# Patient Record
Sex: MALE | Race: WHITE | NOT HISPANIC OR LATINO
[De-identification: names, ages, dates, MRNs, and addresses within clinical notes are randomized per-mention and may not be internally consistent; named-entity substitution may affect disease eponyms.]

---

## 2018-02-12 PROBLEM — Z00.00 ENCOUNTER FOR PREVENTIVE HEALTH EXAMINATION: Status: ACTIVE | Noted: 2018-02-12

## 2018-02-22 ENCOUNTER — APPOINTMENT (OUTPATIENT)
Dept: NEUROLOGY | Facility: CLINIC | Age: 68
End: 2018-02-22
Payer: MEDICARE

## 2018-02-22 VITALS
WEIGHT: 160 LBS | HEIGHT: 69 IN | BODY MASS INDEX: 23.7 KG/M2 | SYSTOLIC BLOOD PRESSURE: 130 MMHG | DIASTOLIC BLOOD PRESSURE: 70 MMHG

## 2018-02-22 DIAGNOSIS — Z87.438 PERSONAL HISTORY OF OTHER DISEASES OF MALE GENITAL ORGANS: ICD-10-CM

## 2018-02-22 PROCEDURE — 99204 OFFICE O/P NEW MOD 45 MIN: CPT

## 2018-04-13 ENCOUNTER — APPOINTMENT (OUTPATIENT)
Dept: NEUROLOGY | Facility: CLINIC | Age: 68
End: 2018-04-13

## 2018-05-26 ENCOUNTER — EMERGENCY (EMERGENCY)
Facility: HOSPITAL | Age: 68
LOS: 1 days | Discharge: HOME | End: 2018-05-26
Attending: EMERGENCY MEDICINE
Payer: MEDICARE

## 2018-05-26 VITALS
OXYGEN SATURATION: 98 % | SYSTOLIC BLOOD PRESSURE: 149 MMHG | DIASTOLIC BLOOD PRESSURE: 91 MMHG | HEART RATE: 73 BPM | RESPIRATION RATE: 18 BRPM | TEMPERATURE: 98 F

## 2018-05-26 VITALS
DIASTOLIC BLOOD PRESSURE: 90 MMHG | HEART RATE: 80 BPM | OXYGEN SATURATION: 98 % | TEMPERATURE: 98 F | RESPIRATION RATE: 20 BRPM | SYSTOLIC BLOOD PRESSURE: 147 MMHG

## 2018-05-26 PROCEDURE — 23650 CLTX SHO DSLC W/MNPJ WO ANES: CPT | Mod: 54

## 2018-05-26 PROCEDURE — 73030 X-RAY EXAM OF SHOULDER: CPT

## 2018-05-26 PROCEDURE — 99283 EMERGENCY DEPT VISIT LOW MDM: CPT | Mod: 57,25

## 2018-05-26 PROCEDURE — 73030 X-RAY EXAM OF SHOULDER: CPT | Mod: 26,77,RT

## 2018-05-26 PROCEDURE — 73030 X-RAY EXAM OF SHOULDER: CPT | Mod: 26,RT

## 2018-05-26 PROCEDURE — 99283 EMERGENCY DEPT VISIT LOW MDM: CPT | Mod: 25

## 2018-05-26 PROCEDURE — 23650 CLTX SHO DSLC W/MNPJ WO ANES: CPT | Mod: RT

## 2018-05-26 RX ORDER — IBUPROFEN 200 MG
1 TABLET ORAL
Qty: 15 | Refills: 0
Start: 2018-05-26 | End: 2018-05-30

## 2018-05-26 RX ORDER — LIDOCAINE HCL 20 MG/ML
5 VIAL (ML) INJECTION ONCE
Qty: 0 | Refills: 0 | Status: COMPLETED | OUTPATIENT
Start: 2018-05-26 | End: 2018-05-26

## 2018-05-26 RX ORDER — OXYCODONE AND ACETAMINOPHEN 5; 325 MG/1; MG/1
1 TABLET ORAL ONCE
Qty: 0 | Refills: 0 | Status: DISCONTINUED | OUTPATIENT
Start: 2018-05-26 | End: 2018-05-26

## 2018-05-26 RX ADMIN — OXYCODONE AND ACETAMINOPHEN 1 TABLET(S): 5; 325 TABLET ORAL at 18:30

## 2018-05-26 RX ADMIN — Medication 5 MILLILITER(S): at 15:30

## 2018-05-26 RX ADMIN — OXYCODONE AND ACETAMINOPHEN 1 TABLET(S): 5; 325 TABLET ORAL at 17:07

## 2018-05-26 NOTE — ED PROVIDER NOTE - MEDICAL DECISION MAKING DETAILS
Right shoulder deformity s/p fall r/o fracture/dislocation.  Will give medication for pain and check Xray.

## 2018-05-26 NOTE — ED PROVIDER NOTE - OBJECTIVE STATEMENT
This patient is a 67 year old man who presents to the ER c/o right shoulder pain s/p fall.  Patient states that he tripped over an object in his yard and fell onto his shoulder.  No LOC.

## 2018-10-07 ENCOUNTER — EMERGENCY (EMERGENCY)
Facility: HOSPITAL | Age: 68
LOS: 1 days | Discharge: HOME | End: 2018-10-07
Payer: MEDICARE

## 2018-10-07 PROCEDURE — 99284 EMERGENCY DEPT VISIT MOD MDM: CPT | Mod: 25

## 2018-10-07 PROCEDURE — 99284 EMERGENCY DEPT VISIT MOD MDM: CPT

## 2018-10-07 PROCEDURE — 70450 CT HEAD/BRAIN W/O DYE: CPT | Mod: 26

## 2018-10-07 PROCEDURE — 70450 CT HEAD/BRAIN W/O DYE: CPT

## 2018-10-08 VITALS
TEMPERATURE: 98 F | HEART RATE: 96 BPM | SYSTOLIC BLOOD PRESSURE: 174 MMHG | RESPIRATION RATE: 20 BRPM | DIASTOLIC BLOOD PRESSURE: 92 MMHG | OXYGEN SATURATION: 97 %

## 2019-06-28 ENCOUNTER — APPOINTMENT (OUTPATIENT)
Dept: ORTHOPEDIC SURGERY | Facility: CLINIC | Age: 69
End: 2019-06-28
Payer: MEDICARE

## 2019-06-28 VITALS
SYSTOLIC BLOOD PRESSURE: 160 MMHG | HEART RATE: 68 BPM | BODY MASS INDEX: 23.7 KG/M2 | HEIGHT: 69 IN | DIASTOLIC BLOOD PRESSURE: 87 MMHG | WEIGHT: 160 LBS

## 2019-06-28 DIAGNOSIS — Z86.59 PERSONAL HISTORY OF OTHER MENTAL AND BEHAVIORAL DISORDERS: ICD-10-CM

## 2019-06-28 DIAGNOSIS — Z87.09 PERSONAL HISTORY OF OTHER DISEASES OF THE RESPIRATORY SYSTEM: ICD-10-CM

## 2019-06-28 DIAGNOSIS — Z86.79 PERSONAL HISTORY OF OTHER DISEASES OF THE CIRCULATORY SYSTEM: ICD-10-CM

## 2019-06-28 DIAGNOSIS — M47.816 SPONDYLOSIS W/OUT MYELOPATHY OR RADICULOPATHY, LUMBAR REGION: ICD-10-CM

## 2019-06-28 PROCEDURE — 99204 OFFICE O/P NEW MOD 45 MIN: CPT

## 2019-06-28 NOTE — ADDENDUM
[FreeTextEntry1] : Documented by Sergio Grant acting as a scribe for Dr. Santiago Camargo on 06/28/2019 . All medical record entries made by the Scribe were at my, Dr. Santiago Camargo, direction and personally dictated by me on 06/28/2019 . I have reviewed the chart and agree that the record accurately reflects my personal performance of the history, physical exam, assessment and plan. I have also personally directed, reviewed, and agreed with the chart.

## 2019-06-28 NOTE — HISTORY OF PRESENT ILLNESS
[Bending] : worsened by bending [Rest] : relieved by rest [de-identified] : 68 year old M presents with lumbar spine pain. Occasional LBP when he bends over for 5-10 minutes, for example when he is fishing. Relieved by relaxing. He walks 2 miles per day with no complaints. No c/o numbness, tingling, or weakness. [Standing] : not worsened by standing [Sitting] : not worsened by sitting [Incontinence] : no incontinence [Walking] : not worsened by walking [Ataxia] : no ataxia [Loss of Dexterity] : good dexterity [Urinary Ret.] : no urinary retention

## 2019-06-28 NOTE — PHYSICAL EXAM
[Acute Distress] : not in acute distress [Poor Appearance] : well-appearing [Obese] : not obese [de-identified] : CONSTITUTIONAL: The patient is a very pleasant individual who is well-nourished and who appears stated age.\par PSYCHIATRIC: The patient is alert and oriented X 3 and in no apparent distress, and participates with orthopedic evaluation well.\par HEAD: Atraumatic and is nonsyndromic in appearance.\par EENT: No visible thyromegaly, EOMI.\par RESPIRATORY: Respiratory rate is regular, not dyspneic on examination.\par LYMPHATICS: There is no inguinal lymphadenopathy\par INTEGUMENTARY: Skin is clean, dry, and intact about the bilateral lower extremities and lumbar spine.\par VASCULAR: There is brisk capillary refill about the bilateral lower extremities.\par NEUROLOGIC: There are no pathologic reflexes. There is no decrease in sensation of the bilateral lower extremities on Wartenberg pinwheel examination. Deep tendon reflexes are well maintained at 2+/4 of the bilateral lower extremities and are symmetric.\par MUSCULOSKELETAL: There is no visible muscular atrophy. Manual motor strength is well maintained in the bilateral lower extremities. Range of motion of lumbar spine is well maintained. The patient ambulates in a non-myelopathic manner. Negative tension sign and straight leg raise bilaterally. Quad extension, ankle dorsiflexion, EHL, plantar flexion, and ankle eversion are well preserved. Normal secondary orthopaedic exam of bilateral hips, greater trochanteric area, knees and ankles \par \par Mechanically orientated low back pain, specifically with prolonged forward flexion. [de-identified] : X-ray of the lumbar spine taken in office today shows lumbar spondylosis, particularly L5-S1.

## 2019-06-28 NOTE — DISCUSSION/SUMMARY
[de-identified] : I have recommended that the pt continue with a conservative treatment plan. Pt has been referred to physical therapy for decreased pain modalities, core strengthening modalities, soft tissue modalities, and physical modalities. I recommend anti-inflammatories right before he goes fishing / PRN. We also spoke about the benefits of using heat, ice, and Bengay cream. Pt can also use a soft OTC lumbar brace. We also discussed activity modification. If he follows this regimen and has no relief in 6 weeks, pt can return to the office and I will order a lumbar MRI.

## 2019-07-20 ENCOUNTER — TRANSCRIPTION ENCOUNTER (OUTPATIENT)
Age: 69
End: 2019-07-20

## 2019-07-21 ENCOUNTER — RESULT REVIEW (OUTPATIENT)
Age: 69
End: 2019-07-21

## 2019-07-21 ENCOUNTER — INPATIENT (INPATIENT)
Facility: HOSPITAL | Age: 69
LOS: 1 days | Discharge: HOME | DRG: 340 | End: 2019-07-23
Attending: SURGERY | Admitting: SURGERY
Payer: MEDICARE

## 2019-07-21 VITALS
SYSTOLIC BLOOD PRESSURE: 123 MMHG | OXYGEN SATURATION: 96 % | RESPIRATION RATE: 20 BRPM | HEIGHT: 70 IN | DIASTOLIC BLOOD PRESSURE: 74 MMHG | TEMPERATURE: 98 F | WEIGHT: 220.02 LBS | HEART RATE: 89 BPM

## 2019-07-21 DIAGNOSIS — K37 UNSPECIFIED APPENDICITIS: ICD-10-CM

## 2019-07-21 LAB
ALBUMIN SERPL ELPH-MCNC: 4 G/DL — SIGNIFICANT CHANGE UP (ref 3.3–5.2)
ALP SERPL-CCNC: 51 U/L — SIGNIFICANT CHANGE UP (ref 40–120)
ALT FLD-CCNC: 17 U/L — SIGNIFICANT CHANGE UP
ANION GAP SERPL CALC-SCNC: 10 MMOL/L — SIGNIFICANT CHANGE UP (ref 5–17)
AST SERPL-CCNC: 22 U/L — SIGNIFICANT CHANGE UP
BASOPHILS # BLD AUTO: 0.07 K/UL — SIGNIFICANT CHANGE UP (ref 0–0.2)
BASOPHILS NFR BLD AUTO: 0.9 % — SIGNIFICANT CHANGE UP (ref 0–2)
BILIRUB SERPL-MCNC: 0.7 MG/DL — SIGNIFICANT CHANGE UP (ref 0.4–2)
BLD GP AB SCN SERPL QL: SIGNIFICANT CHANGE UP
BUN SERPL-MCNC: 15 MG/DL — SIGNIFICANT CHANGE UP (ref 8–20)
CALCIUM SERPL-MCNC: 9.5 MG/DL — SIGNIFICANT CHANGE UP (ref 8.6–10.2)
CHLORIDE SERPL-SCNC: 98 MMOL/L — SIGNIFICANT CHANGE UP (ref 98–107)
CO2 SERPL-SCNC: 24 MMOL/L — SIGNIFICANT CHANGE UP (ref 22–29)
CREAT SERPL-MCNC: 0.94 MG/DL — SIGNIFICANT CHANGE UP (ref 0.5–1.3)
EOSINOPHIL # BLD AUTO: 0.14 K/UL — SIGNIFICANT CHANGE UP (ref 0–0.5)
EOSINOPHIL NFR BLD AUTO: 1.7 % — SIGNIFICANT CHANGE UP (ref 0–6)
GLUCOSE SERPL-MCNC: 120 MG/DL — HIGH (ref 70–115)
HCT VFR BLD CALC: 41.6 % — SIGNIFICANT CHANGE UP (ref 39–50)
HGB BLD-MCNC: 14 G/DL — SIGNIFICANT CHANGE UP (ref 13–17)
LIDOCAIN IGE QN: 26 U/L — SIGNIFICANT CHANGE UP (ref 22–51)
LYMPHOCYTES # BLD AUTO: 1.86 K/UL — SIGNIFICANT CHANGE UP (ref 1–3.3)
LYMPHOCYTES # BLD AUTO: 22.6 % — SIGNIFICANT CHANGE UP (ref 13–44)
MCHC RBC-ENTMCNC: 31.2 PG — SIGNIFICANT CHANGE UP (ref 27–34)
MCHC RBC-ENTMCNC: 33.7 GM/DL — SIGNIFICANT CHANGE UP (ref 32–36)
MCV RBC AUTO: 92.7 FL — SIGNIFICANT CHANGE UP (ref 80–100)
MONOCYTES # BLD AUTO: 1.01 K/UL — HIGH (ref 0–0.9)
MONOCYTES NFR BLD AUTO: 12.2 % — SIGNIFICANT CHANGE UP (ref 2–14)
NEUTROPHILS # BLD AUTO: 4.95 K/UL — SIGNIFICANT CHANGE UP (ref 1.8–7.4)
NEUTROPHILS NFR BLD AUTO: 60 % — SIGNIFICANT CHANGE UP (ref 43–77)
PLAT MORPH BLD: NORMAL — SIGNIFICANT CHANGE UP
PLATELET # BLD AUTO: 175 K/UL — SIGNIFICANT CHANGE UP (ref 150–400)
POTASSIUM SERPL-MCNC: 3.9 MMOL/L — SIGNIFICANT CHANGE UP (ref 3.5–5.3)
POTASSIUM SERPL-SCNC: 3.9 MMOL/L — SIGNIFICANT CHANGE UP (ref 3.5–5.3)
PROT SERPL-MCNC: 7 G/DL — SIGNIFICANT CHANGE UP (ref 6.6–8.7)
RBC # BLD: 4.49 M/UL — SIGNIFICANT CHANGE UP (ref 4.2–5.8)
RBC # FLD: 12.6 % — SIGNIFICANT CHANGE UP (ref 10.3–14.5)
RBC BLD AUTO: NORMAL — SIGNIFICANT CHANGE UP
SODIUM SERPL-SCNC: 132 MMOL/L — LOW (ref 135–145)
VARIANT LYMPHS # BLD: 2.6 % — SIGNIFICANT CHANGE UP (ref 0–6)
WBC # BLD: 8.25 K/UL — SIGNIFICANT CHANGE UP (ref 3.8–10.5)
WBC # FLD AUTO: 8.25 K/UL — SIGNIFICANT CHANGE UP (ref 3.8–10.5)

## 2019-07-21 PROCEDURE — 88304 TISSUE EXAM BY PATHOLOGIST: CPT | Mod: 26

## 2019-07-21 PROCEDURE — 93010 ELECTROCARDIOGRAM REPORT: CPT

## 2019-07-21 PROCEDURE — 74177 CT ABD & PELVIS W/CONTRAST: CPT | Mod: 26

## 2019-07-21 PROCEDURE — 99284 EMERGENCY DEPT VISIT MOD MDM: CPT

## 2019-07-21 RX ORDER — LISINOPRIL 2.5 MG/1
40 TABLET ORAL DAILY
Refills: 0 | Status: DISCONTINUED | OUTPATIENT
Start: 2019-07-21 | End: 2019-07-23

## 2019-07-21 RX ORDER — ONDANSETRON 8 MG/1
4 TABLET, FILM COATED ORAL ONCE
Refills: 0 | Status: COMPLETED | OUTPATIENT
Start: 2019-07-21 | End: 2019-07-21

## 2019-07-21 RX ORDER — ATORVASTATIN CALCIUM 80 MG/1
10 TABLET, FILM COATED ORAL AT BEDTIME
Refills: 0 | Status: DISCONTINUED | OUTPATIENT
Start: 2019-07-21 | End: 2019-07-23

## 2019-07-21 RX ORDER — ESCITALOPRAM OXALATE 10 MG/1
20 TABLET, FILM COATED ORAL DAILY
Refills: 0 | Status: DISCONTINUED | OUTPATIENT
Start: 2019-07-21 | End: 2019-07-23

## 2019-07-21 RX ORDER — SODIUM CHLORIDE 9 MG/ML
1000 INJECTION, SOLUTION INTRAVENOUS
Refills: 0 | Status: DISCONTINUED | OUTPATIENT
Start: 2019-07-21 | End: 2019-07-21

## 2019-07-21 RX ORDER — CEFOTETAN DISODIUM 1 G
VIAL (EA) INJECTION
Refills: 0 | Status: DISCONTINUED | OUTPATIENT
Start: 2019-07-21 | End: 2019-07-21

## 2019-07-21 RX ORDER — PIPERACILLIN AND TAZOBACTAM 4; .5 G/20ML; G/20ML
3.38 INJECTION, POWDER, LYOPHILIZED, FOR SOLUTION INTRAVENOUS ONCE
Refills: 0 | Status: DISCONTINUED | OUTPATIENT
Start: 2019-07-21 | End: 2019-07-21

## 2019-07-21 RX ORDER — PIPERACILLIN AND TAZOBACTAM 4; .5 G/20ML; G/20ML
3.38 INJECTION, POWDER, LYOPHILIZED, FOR SOLUTION INTRAVENOUS EVERY 8 HOURS
Refills: 0 | Status: DISCONTINUED | OUTPATIENT
Start: 2019-07-21 | End: 2019-07-23

## 2019-07-21 RX ORDER — SODIUM CHLORIDE 9 MG/ML
1000 INJECTION INTRAMUSCULAR; INTRAVENOUS; SUBCUTANEOUS ONCE
Refills: 0 | Status: COMPLETED | OUTPATIENT
Start: 2019-07-21 | End: 2019-07-21

## 2019-07-21 RX ORDER — OXYCODONE AND ACETAMINOPHEN 5; 325 MG/1; MG/1
1 TABLET ORAL EVERY 4 HOURS
Refills: 0 | Status: DISCONTINUED | OUTPATIENT
Start: 2019-07-21 | End: 2019-07-23

## 2019-07-21 RX ORDER — BUDESONIDE AND FORMOTEROL FUMARATE DIHYDRATE 160; 4.5 UG/1; UG/1
2 AEROSOL RESPIRATORY (INHALATION)
Refills: 0 | Status: DISCONTINUED | OUTPATIENT
Start: 2019-07-21 | End: 2019-07-23

## 2019-07-21 RX ORDER — IBUPROFEN 200 MG
400 TABLET ORAL EVERY 8 HOURS
Refills: 0 | Status: DISCONTINUED | OUTPATIENT
Start: 2019-07-21 | End: 2019-07-23

## 2019-07-21 RX ORDER — HYDROMORPHONE HYDROCHLORIDE 2 MG/ML
1 INJECTION INTRAMUSCULAR; INTRAVENOUS; SUBCUTANEOUS
Refills: 0 | Status: DISCONTINUED | OUTPATIENT
Start: 2019-07-21 | End: 2019-07-21

## 2019-07-21 RX ORDER — DOCUSATE SODIUM 100 MG
100 CAPSULE ORAL THREE TIMES A DAY
Refills: 0 | Status: DISCONTINUED | OUTPATIENT
Start: 2019-07-21 | End: 2019-07-23

## 2019-07-21 RX ORDER — SODIUM CHLORIDE 9 MG/ML
1000 INJECTION INTRAMUSCULAR; INTRAVENOUS; SUBCUTANEOUS
Refills: 0 | Status: DISCONTINUED | OUTPATIENT
Start: 2019-07-21 | End: 2019-07-22

## 2019-07-21 RX ORDER — OXYCODONE AND ACETAMINOPHEN 5; 325 MG/1; MG/1
2 TABLET ORAL EVERY 6 HOURS
Refills: 0 | Status: DISCONTINUED | OUTPATIENT
Start: 2019-07-21 | End: 2019-07-23

## 2019-07-21 RX ORDER — FENTANYL CITRATE 50 UG/ML
50 INJECTION INTRAVENOUS
Refills: 0 | Status: DISCONTINUED | OUTPATIENT
Start: 2019-07-21 | End: 2019-07-21

## 2019-07-21 RX ORDER — DOXAZOSIN MESYLATE 4 MG
4 TABLET ORAL AT BEDTIME
Refills: 0 | Status: DISCONTINUED | OUTPATIENT
Start: 2019-07-21 | End: 2019-07-23

## 2019-07-21 RX ORDER — FAMOTIDINE 10 MG/ML
20 INJECTION INTRAVENOUS EVERY 12 HOURS
Refills: 0 | Status: DISCONTINUED | OUTPATIENT
Start: 2019-07-21 | End: 2019-07-23

## 2019-07-21 RX ORDER — CEFOTETAN DISODIUM 1 G
2 VIAL (EA) INJECTION ONCE
Refills: 0 | Status: DISCONTINUED | OUTPATIENT
Start: 2019-07-21 | End: 2019-07-21

## 2019-07-21 RX ORDER — MORPHINE SULFATE 50 MG/1
4 CAPSULE, EXTENDED RELEASE ORAL ONCE
Refills: 0 | Status: DISCONTINUED | OUTPATIENT
Start: 2019-07-21 | End: 2019-07-21

## 2019-07-21 RX ADMIN — MORPHINE SULFATE 4 MILLIGRAM(S): 50 CAPSULE, EXTENDED RELEASE ORAL at 13:05

## 2019-07-21 RX ADMIN — FENTANYL CITRATE 50 MICROGRAM(S): 50 INJECTION INTRAVENOUS at 20:38

## 2019-07-21 RX ADMIN — OXYCODONE AND ACETAMINOPHEN 1 TABLET(S): 5; 325 TABLET ORAL at 23:00

## 2019-07-21 RX ADMIN — SODIUM CHLORIDE 1000 MILLILITER(S): 9 INJECTION INTRAMUSCULAR; INTRAVENOUS; SUBCUTANEOUS at 13:05

## 2019-07-21 RX ADMIN — ONDANSETRON 4 MILLIGRAM(S): 8 TABLET, FILM COATED ORAL at 13:05

## 2019-07-21 RX ADMIN — PIPERACILLIN AND TAZOBACTAM 25 GRAM(S): 4; .5 INJECTION, POWDER, LYOPHILIZED, FOR SOLUTION INTRAVENOUS at 21:49

## 2019-07-21 RX ADMIN — SODIUM CHLORIDE 120 MILLILITER(S): 9 INJECTION INTRAMUSCULAR; INTRAVENOUS; SUBCUTANEOUS at 21:50

## 2019-07-21 RX ADMIN — ONDANSETRON 4 MILLIGRAM(S): 8 TABLET, FILM COATED ORAL at 19:54

## 2019-07-21 RX ADMIN — Medication 4 MILLIGRAM(S): at 21:50

## 2019-07-21 RX ADMIN — ATORVASTATIN CALCIUM 10 MILLIGRAM(S): 80 TABLET, FILM COATED ORAL at 21:50

## 2019-07-21 RX ADMIN — OXYCODONE AND ACETAMINOPHEN 1 TABLET(S): 5; 325 TABLET ORAL at 22:01

## 2019-07-21 RX ADMIN — Medication 100 MILLIGRAM(S): at 21:50

## 2019-07-21 RX ADMIN — FENTANYL CITRATE 50 MICROGRAM(S): 50 INJECTION INTRAVENOUS at 19:54

## 2019-07-21 NOTE — ED STATDOCS - ATTENDING CONTRIBUTION TO CARE
I, Severino Hargrove, performed the initial face to face bedside interview with this patient regarding history of present illness, review of symptoms and relevant past medical, social and family history.  I completed an independent physical examination.  I was the initial provider who evaluated this patient. I have signed out the follow up of any pending tests (i.e. labs, radiological studies) to the ACP.  I have communicated the patient’s plan of care and disposition with the ACP.  The history, relevant review of systems, past medical and surgical history, medical decision making, and physical examination was documented by the scribe in my presence and I attest to the accuracy of the documentation.

## 2019-07-21 NOTE — ED ADULT NURSE NOTE - OBJECTIVE STATEMENT
patient complaining of lower abdominal pain intermittent for 3 days, ttp to right lower quadrant and some tenderness to left lower quadrant with associated nausea with vomiting, denied fever,

## 2019-07-21 NOTE — ED ADULT TRIAGE NOTE - CHIEF COMPLAINT QUOTE
Pt sent from urgent care for r/o bowel obstruction, difficulty urinating/having BM x 3 days and generalized abdominal pain, c/o nausea without vomiting

## 2019-07-21 NOTE — BRIEF OPERATIVE NOTE - NSICDXBRIEFPOSTOP_GEN_ALL_CORE_FT
POST-OP DIAGNOSIS:  Acute appendicitis with perforation, localized peritonitis, and gangrene, without abscess 21-Jul-2019 19:37:05  Romie Mcclendon

## 2019-07-21 NOTE — ED STATDOCS - GASTROINTESTINAL, MLM
abdomen soft, RUQ and RLQ tender to palpation, more significant in RLQ, with guarding and rebounding, and non-distended.

## 2019-07-21 NOTE — H&P ADULT - NSHPPHYSICALEXAM_GEN_ALL_CORE
Constitutional: Patient resting comfortably in bed in no acute distress   HEENT: EOMI/PERRL b/l  Neck: No JVD  Respiratory: CTAB with unlabored respirations, no accessory muscle use or conversational dyspnea   Cardiovascular: Regular rate and rhythm with no arrythmias or murmurs  Gastrointestinal: Abdomen soft, moderate RLQ tenderness, non-distended with no rebound tenderness or guarding   Rectal: Not indicated   Neurological: GCS 15 (E4V5M6) with no gross sensory, motor or coordination deficits   Psychiatric: Normal mood and affect   Musculoskeletal: No joint pain, swelling or deformity with no limitation of movement

## 2019-07-21 NOTE — H&P ADULT - ASSESSMENT
68 year old male with acute appendicitis requiring operative intervention by ACS/Trauma team today (7/21)

## 2019-07-21 NOTE — ED STATDOCS - CLINICAL SUMMARY MEDICAL DECISION MAKING FREE TEXT BOX
69 y/o M pt with 3-day hx of abd pain, mostly in RLQ. Concern for acute cholecystitis, appendicitis, or SBO. Labs, fluids, reevaluate. 67 y/o M pt with 3-day hx of abd pain in RUQ and RLQ, mostly in RLQ. Concern for acute cholecystitis, appendicitis, or SBO. Labs, fluids, reevaluate.

## 2019-07-21 NOTE — ED STATDOCS - OBJECTIVE STATEMENT
67 y/o M pt with no significant hx presents to ED with lower cramping abdominal pain beginning 3 days ago. Pt reports that he has not been able to urinate or have proper bowel movements since the pain began, but had a bowel movement in ED. Pt reports nausea and vomiting. Denies CP, SOB, fever, and chills. NKDA. No further complaints at this time.

## 2019-07-21 NOTE — H&P ADULT - PROBLEM SELECTOR PLAN 1
1. Admit to ACS/Trauma   2. NPO and IVFs 1. Admit to ACS/Trauma   2. Stat dose IV ABx   3. NPO and IVFs   4. Book for OR - lap appy possible open with Dr. White   5. Restart home meds but hold ASA pre-op 1. Admit to ACS/Trauma   2. Stat dose IV ABx   3. NPO and IVFs   4. Book for OR - lap appy possible open with Dr. White   5. Restart home meds but hold ASA pre-op  6. CIWA scoring initiated (no meds ordered)

## 2019-07-21 NOTE — H&P ADULT - HISTORY OF PRESENT ILLNESS
68 year old male with chief complaint of abdominal pain x3 days. Reports that the pain started in the periumbilical region and migrated to RLQ. Describes pain as sharp, radiating, intermittent rated 6/10 in severity. Pain is associated with anorexia x 2 days. ROS negative for fever, chills, nausea, vomiting, chest pain, dyspnea dysuria or changes in bowel habits.     PMHx: COPD, HTN, Hep C Dx >10yrs ago and treated   PSHx: Tonsillectomy and rhinoplasty for nasal septum deviation    Allergies: None  Meds: ASA 81 (last taken 7/20 PM) symbicort, lexapro 20mg, ramipril 10mg, terazosin 5mg, atorvastatin 10mg  FamHx: Non contributory   SocHx: Former smoker (1 pack/day x 40yrs) current EtOH use (not forthcoming with amount)

## 2019-07-21 NOTE — BRIEF OPERATIVE NOTE - OPERATION/FINDINGS
-Wound Class III  -Perforated appendicitis; RLQ copiously irrigated and suctioned  Intact staple line across a corner of cecum

## 2019-07-21 NOTE — ED STATDOCS - PROGRESS NOTE DETAILS
68 year old male with no PMhx presents for RLQ pain x 3 days. RLQ TTP. HPI/ ROS/ PEx as stated above. CT abd reveals acute appendicitis. ACS called to see pt.

## 2019-07-22 LAB
ALBUMIN SERPL ELPH-MCNC: 3.5 G/DL — SIGNIFICANT CHANGE UP (ref 3.3–5.2)
ALP SERPL-CCNC: 41 U/L — SIGNIFICANT CHANGE UP (ref 40–120)
ALT FLD-CCNC: 16 U/L — SIGNIFICANT CHANGE UP
ANION GAP SERPL CALC-SCNC: 10 MMOL/L — SIGNIFICANT CHANGE UP (ref 5–17)
ANION GAP SERPL CALC-SCNC: 12 MMOL/L — SIGNIFICANT CHANGE UP (ref 5–17)
ANISOCYTOSIS BLD QL: SLIGHT — SIGNIFICANT CHANGE UP
AST SERPL-CCNC: 19 U/L — SIGNIFICANT CHANGE UP
BASOPHILS # BLD AUTO: 0.02 K/UL — SIGNIFICANT CHANGE UP (ref 0–0.2)
BASOPHILS NFR BLD AUTO: 0.1 % — SIGNIFICANT CHANGE UP (ref 0–2)
BILIRUB DIRECT SERPL-MCNC: 0.2 MG/DL — SIGNIFICANT CHANGE UP (ref 0–0.3)
BILIRUB INDIRECT FLD-MCNC: 0.4 MG/DL — SIGNIFICANT CHANGE UP (ref 0.2–1)
BILIRUB SERPL-MCNC: 0.6 MG/DL — SIGNIFICANT CHANGE UP (ref 0.4–2)
BUN SERPL-MCNC: 15 MG/DL — SIGNIFICANT CHANGE UP (ref 8–20)
BUN SERPL-MCNC: 16 MG/DL — SIGNIFICANT CHANGE UP (ref 8–20)
CALCIUM SERPL-MCNC: 9 MG/DL — SIGNIFICANT CHANGE UP (ref 8.6–10.2)
CALCIUM SERPL-MCNC: 9.2 MG/DL — SIGNIFICANT CHANGE UP (ref 8.6–10.2)
CHLORIDE SERPL-SCNC: 100 MMOL/L — SIGNIFICANT CHANGE UP (ref 98–107)
CHLORIDE SERPL-SCNC: 98 MMOL/L — SIGNIFICANT CHANGE UP (ref 98–107)
CO2 SERPL-SCNC: 24 MMOL/L — SIGNIFICANT CHANGE UP (ref 22–29)
CO2 SERPL-SCNC: 25 MMOL/L — SIGNIFICANT CHANGE UP (ref 22–29)
CREAT SERPL-MCNC: 0.98 MG/DL — SIGNIFICANT CHANGE UP (ref 0.5–1.3)
CREAT SERPL-MCNC: 1.05 MG/DL — SIGNIFICANT CHANGE UP (ref 0.5–1.3)
EOSINOPHIL # BLD AUTO: 0 K/UL — SIGNIFICANT CHANGE UP (ref 0–0.5)
EOSINOPHIL NFR BLD AUTO: 0 % — SIGNIFICANT CHANGE UP (ref 0–6)
GIANT PLATELETS BLD QL SMEAR: PRESENT — SIGNIFICANT CHANGE UP
GLUCOSE SERPL-MCNC: 136 MG/DL — HIGH (ref 70–115)
GLUCOSE SERPL-MCNC: 175 MG/DL — HIGH (ref 70–115)
HCT VFR BLD CALC: 39.4 % — SIGNIFICANT CHANGE UP (ref 39–50)
HCV AB S/CO SERPL IA: 11.5 S/CO — HIGH (ref 0–0.99)
HCV AB SERPL-IMP: REACTIVE
HGB BLD-MCNC: 13.2 G/DL — SIGNIFICANT CHANGE UP (ref 13–17)
IMM GRANULOCYTES NFR BLD AUTO: 0.5 % — SIGNIFICANT CHANGE UP (ref 0–1.5)
LYMPHOCYTES # BLD AUTO: 0.45 K/UL — LOW (ref 1–3.3)
LYMPHOCYTES # BLD AUTO: 2.5 % — LOW (ref 13–44)
MACROCYTES BLD QL: SLIGHT — SIGNIFICANT CHANGE UP
MAGNESIUM SERPL-MCNC: 2 MG/DL — SIGNIFICANT CHANGE UP (ref 1.6–2.6)
MAGNESIUM SERPL-MCNC: 2 MG/DL — SIGNIFICANT CHANGE UP (ref 1.8–2.6)
MANUAL SMEAR VERIFICATION: SIGNIFICANT CHANGE UP
MCHC RBC-ENTMCNC: 31.5 PG — SIGNIFICANT CHANGE UP (ref 27–34)
MCHC RBC-ENTMCNC: 33.5 GM/DL — SIGNIFICANT CHANGE UP (ref 32–36)
MCV RBC AUTO: 94 FL — SIGNIFICANT CHANGE UP (ref 80–100)
MICROCYTES BLD QL: SLIGHT — SIGNIFICANT CHANGE UP
MONOCYTES # BLD AUTO: 1.09 K/UL — HIGH (ref 0–0.9)
MONOCYTES NFR BLD AUTO: 6.1 % — SIGNIFICANT CHANGE UP (ref 2–14)
NEUTROPHILS # BLD AUTO: 16.2 K/UL — HIGH (ref 1.8–7.4)
NEUTROPHILS NFR BLD AUTO: 90.8 % — HIGH (ref 43–77)
NEUTS BAND # BLD: 1.7 % — SIGNIFICANT CHANGE UP (ref 0–8)
PHOSPHATE SERPL-MCNC: 2.3 MG/DL — LOW (ref 2.4–4.7)
PHOSPHATE SERPL-MCNC: 4.1 MG/DL — SIGNIFICANT CHANGE UP (ref 2.4–4.7)
PLAT MORPH BLD: ABNORMAL
PLATELET # BLD AUTO: 182 K/UL — SIGNIFICANT CHANGE UP (ref 150–400)
PLATELET COUNT - ESTIMATE: NORMAL — SIGNIFICANT CHANGE UP
POIKILOCYTOSIS BLD QL AUTO: SLIGHT — SIGNIFICANT CHANGE UP
POTASSIUM SERPL-MCNC: 4.3 MMOL/L — SIGNIFICANT CHANGE UP (ref 3.5–5.3)
POTASSIUM SERPL-MCNC: 4.7 MMOL/L — SIGNIFICANT CHANGE UP (ref 3.5–5.3)
POTASSIUM SERPL-SCNC: 4.3 MMOL/L — SIGNIFICANT CHANGE UP (ref 3.5–5.3)
POTASSIUM SERPL-SCNC: 4.7 MMOL/L — SIGNIFICANT CHANGE UP (ref 3.5–5.3)
PROT SERPL-MCNC: 6.6 G/DL — SIGNIFICANT CHANGE UP (ref 6.6–8.7)
RBC # BLD: 4.19 M/UL — LOW (ref 4.2–5.8)
RBC # FLD: 12.3 % — SIGNIFICANT CHANGE UP (ref 10.3–14.5)
RBC BLD AUTO: ABNORMAL
SODIUM SERPL-SCNC: 134 MMOL/L — LOW (ref 135–145)
SODIUM SERPL-SCNC: 135 MMOL/L — SIGNIFICANT CHANGE UP (ref 135–145)
WBC # BLD: 17.85 K/UL — HIGH (ref 3.8–10.5)
WBC # FLD AUTO: 17.85 K/UL — HIGH (ref 3.8–10.5)

## 2019-07-22 PROCEDURE — 93010 ELECTROCARDIOGRAM REPORT: CPT

## 2019-07-22 PROCEDURE — 99024 POSTOP FOLLOW-UP VISIT: CPT

## 2019-07-22 RX ORDER — HEPARIN SODIUM 5000 [USP'U]/ML
5000 INJECTION INTRAVENOUS; SUBCUTANEOUS EVERY 8 HOURS
Refills: 0 | Status: DISCONTINUED | OUTPATIENT
Start: 2019-07-22 | End: 2019-07-23

## 2019-07-22 RX ORDER — ONDANSETRON 8 MG/1
8 TABLET, FILM COATED ORAL
Refills: 0 | Status: DISCONTINUED | OUTPATIENT
Start: 2019-07-22 | End: 2019-07-23

## 2019-07-22 RX ADMIN — BUDESONIDE AND FORMOTEROL FUMARATE DIHYDRATE 2 PUFF(S): 160; 4.5 AEROSOL RESPIRATORY (INHALATION) at 20:32

## 2019-07-22 RX ADMIN — BUDESONIDE AND FORMOTEROL FUMARATE DIHYDRATE 2 PUFF(S): 160; 4.5 AEROSOL RESPIRATORY (INHALATION) at 09:39

## 2019-07-22 RX ADMIN — Medication 1 MILLIGRAM(S): at 23:42

## 2019-07-22 RX ADMIN — PIPERACILLIN AND TAZOBACTAM 25 GRAM(S): 4; .5 INJECTION, POWDER, LYOPHILIZED, FOR SOLUTION INTRAVENOUS at 05:22

## 2019-07-22 RX ADMIN — Medication 4 MILLIGRAM(S): at 23:41

## 2019-07-22 RX ADMIN — PIPERACILLIN AND TAZOBACTAM 25 GRAM(S): 4; .5 INJECTION, POWDER, LYOPHILIZED, FOR SOLUTION INTRAVENOUS at 23:25

## 2019-07-22 RX ADMIN — PIPERACILLIN AND TAZOBACTAM 25 GRAM(S): 4; .5 INJECTION, POWDER, LYOPHILIZED, FOR SOLUTION INTRAVENOUS at 13:00

## 2019-07-22 RX ADMIN — HEPARIN SODIUM 5000 UNIT(S): 5000 INJECTION INTRAVENOUS; SUBCUTANEOUS at 13:01

## 2019-07-22 RX ADMIN — Medication 100 MILLIGRAM(S): at 13:01

## 2019-07-22 RX ADMIN — SODIUM CHLORIDE 120 MILLILITER(S): 9 INJECTION INTRAMUSCULAR; INTRAVENOUS; SUBCUTANEOUS at 05:22

## 2019-07-22 RX ADMIN — Medication 100 MILLIGRAM(S): at 23:26

## 2019-07-22 RX ADMIN — ESCITALOPRAM OXALATE 20 MILLIGRAM(S): 10 TABLET, FILM COATED ORAL at 13:01

## 2019-07-22 RX ADMIN — Medication 100 MILLIGRAM(S): at 05:22

## 2019-07-22 RX ADMIN — ATORVASTATIN CALCIUM 10 MILLIGRAM(S): 80 TABLET, FILM COATED ORAL at 23:28

## 2019-07-22 RX ADMIN — Medication 1 MILLIGRAM(S): at 18:56

## 2019-07-22 RX ADMIN — LISINOPRIL 40 MILLIGRAM(S): 2.5 TABLET ORAL at 05:22

## 2019-07-22 RX ADMIN — HEPARIN SODIUM 5000 UNIT(S): 5000 INJECTION INTRAVENOUS; SUBCUTANEOUS at 23:26

## 2019-07-22 NOTE — PROGRESS NOTE ADULT - ASSESSMENT
Assessment:  The patient is a 68y Male who is now several hours post-op from a lap appy    Plan:  -f/u TOV  - Pain control as needed  - DVT ppx  - OOB and ambulating as tolerated  - F/u AM labs

## 2019-07-22 NOTE — PROGRESS NOTE ADULT - SUBJECTIVE AND OBJECTIVE BOX
POST-OPERATIVE NOTE    Subjective:  Patient is s/p lap appy . Recovering appropriately. Pt is tolerating liquids w/o n/v. No BM or flatus. TOV at 3am. Denies CP/SOB, abdominal pain, leg pain, or LE swelling.     Vital Signs Last 24 Hrs  T(C): 36.6 (22 Jul 2019 00:03), Max: 36.8 (21 Jul 2019 20:48)  T(F): 97.9 (22 Jul 2019 00:03), Max: 98.2 (21 Jul 2019 20:48)  HR: 85 (22 Jul 2019 00:03) (64 - 89)  BP: 133/83 (22 Jul 2019 00:03) (113/72 - 157/82)  BP(mean): --  RR: 18 (22 Jul 2019 00:03) (12 - 20)  SpO2: 94% (22 Jul 2019 00:03) (94% - 98%)  I&O's Detail    21 Jul 2019 07:01  -  22 Jul 2019 02:05  --------------------------------------------------------  IN:  Total IN: 0 mL    OUT:    Estimated Blood Loss: 5 mL  Total OUT: 5 mL    Total NET: -5 mL        piperacillin/tazobactam IVPB.. 3.375  doxazosin 4  lisinopril 40  piperacillin/tazobactam IVPB.. 3.375    PAST MEDICAL & SURGICAL HISTORY:  Hyperlipidemia, unspecified hyperlipidemia type  Hypertension  No significant past surgical history        Physical Exam:  General: NAD, resting comfortably in bed  Pulmonary: Nonlabored breathing, no respiratory distress  Cardiovascular: NSR  Abdominal: soft, NT/ND  Extremities: WWP      LABS:                        14.0   8.25  )-----------( 175      ( 21 Jul 2019 13:30 )             41.6     07-21    132<L>  |  98  |  15.0  ----------------------------<  120<H>  3.9   |  24.0  |  0.94    Ca    9.5      21 Jul 2019 13:30    TPro  7.0  /  Alb  4.0  /  TBili  0.7  /  DBili  x   /  AST  22  /  ALT  17  /  AlkPhos  51  07-21      CAPILLARY BLOOD GLUCOSE

## 2019-07-23 ENCOUNTER — TRANSCRIPTION ENCOUNTER (OUTPATIENT)
Age: 69
End: 2019-07-23

## 2019-07-23 VITALS
RESPIRATION RATE: 18 BRPM | OXYGEN SATURATION: 95 % | HEART RATE: 73 BPM | DIASTOLIC BLOOD PRESSURE: 69 MMHG | TEMPERATURE: 98 F | SYSTOLIC BLOOD PRESSURE: 116 MMHG

## 2019-07-23 LAB
ANION GAP SERPL CALC-SCNC: 10 MMOL/L — SIGNIFICANT CHANGE UP (ref 5–17)
BASOPHILS # BLD AUTO: 0.03 K/UL — SIGNIFICANT CHANGE UP (ref 0–0.2)
BASOPHILS NFR BLD AUTO: 0.3 % — SIGNIFICANT CHANGE UP (ref 0–2)
BUN SERPL-MCNC: 16 MG/DL — SIGNIFICANT CHANGE UP (ref 8–20)
CALCIUM SERPL-MCNC: 8.4 MG/DL — LOW (ref 8.6–10.2)
CHLORIDE SERPL-SCNC: 101 MMOL/L — SIGNIFICANT CHANGE UP (ref 98–107)
CO2 SERPL-SCNC: 24 MMOL/L — SIGNIFICANT CHANGE UP (ref 22–29)
CREAT SERPL-MCNC: 1.08 MG/DL — SIGNIFICANT CHANGE UP (ref 0.5–1.3)
EOSINOPHIL # BLD AUTO: 0.13 K/UL — SIGNIFICANT CHANGE UP (ref 0–0.5)
EOSINOPHIL NFR BLD AUTO: 1.4 % — SIGNIFICANT CHANGE UP (ref 0–6)
GLUCOSE SERPL-MCNC: 119 MG/DL — HIGH (ref 70–115)
HCT VFR BLD CALC: 36.4 % — LOW (ref 39–50)
HCV RNA FLD QL NAA+PROBE: SIGNIFICANT CHANGE UP
HCV RNA SPEC QL PROBE+SIG AMP: SIGNIFICANT CHANGE UP
HGB BLD-MCNC: 12.1 G/DL — LOW (ref 13–17)
IMM GRANULOCYTES NFR BLD AUTO: 0.4 % — SIGNIFICANT CHANGE UP (ref 0–1.5)
LYMPHOCYTES # BLD AUTO: 1.54 K/UL — SIGNIFICANT CHANGE UP (ref 1–3.3)
LYMPHOCYTES # BLD AUTO: 16 % — SIGNIFICANT CHANGE UP (ref 13–44)
MAGNESIUM SERPL-MCNC: 2 MG/DL — SIGNIFICANT CHANGE UP (ref 1.6–2.6)
MCHC RBC-ENTMCNC: 31.1 PG — SIGNIFICANT CHANGE UP (ref 27–34)
MCHC RBC-ENTMCNC: 33.2 GM/DL — SIGNIFICANT CHANGE UP (ref 32–36)
MCV RBC AUTO: 93.6 FL — SIGNIFICANT CHANGE UP (ref 80–100)
MONOCYTES # BLD AUTO: 1.22 K/UL — HIGH (ref 0–0.9)
MONOCYTES NFR BLD AUTO: 12.7 % — SIGNIFICANT CHANGE UP (ref 2–14)
NEUTROPHILS # BLD AUTO: 6.64 K/UL — SIGNIFICANT CHANGE UP (ref 1.8–7.4)
NEUTROPHILS NFR BLD AUTO: 69.2 % — SIGNIFICANT CHANGE UP (ref 43–77)
PHOSPHATE SERPL-MCNC: 2.5 MG/DL — SIGNIFICANT CHANGE UP (ref 2.4–4.7)
PLATELET # BLD AUTO: 183 K/UL — SIGNIFICANT CHANGE UP (ref 150–400)
POTASSIUM SERPL-MCNC: 3.8 MMOL/L — SIGNIFICANT CHANGE UP (ref 3.5–5.3)
POTASSIUM SERPL-SCNC: 3.8 MMOL/L — SIGNIFICANT CHANGE UP (ref 3.5–5.3)
RBC # BLD: 3.89 M/UL — LOW (ref 4.2–5.8)
RBC # FLD: 12.4 % — SIGNIFICANT CHANGE UP (ref 10.3–14.5)
SODIUM SERPL-SCNC: 135 MMOL/L — SIGNIFICANT CHANGE UP (ref 135–145)
WBC # BLD: 9.6 K/UL — SIGNIFICANT CHANGE UP (ref 3.8–10.5)
WBC # FLD AUTO: 9.6 K/UL — SIGNIFICANT CHANGE UP (ref 3.8–10.5)

## 2019-07-23 PROCEDURE — 99024 POSTOP FOLLOW-UP VISIT: CPT

## 2019-07-23 RX ORDER — OXYCODONE HYDROCHLORIDE 5 MG/1
10 TABLET ORAL EVERY 4 HOURS
Refills: 0 | Status: DISCONTINUED | OUTPATIENT
Start: 2019-07-23 | End: 2019-07-23

## 2019-07-23 RX ORDER — OXYCODONE HYDROCHLORIDE 5 MG/1
1 TABLET ORAL
Qty: 12 | Refills: 0
Start: 2019-07-23 | End: 2019-07-25

## 2019-07-23 RX ORDER — IBUPROFEN 200 MG
1 TABLET ORAL
Qty: 0 | Refills: 0 | DISCHARGE
Start: 2019-07-23

## 2019-07-23 RX ORDER — ENOXAPARIN SODIUM 100 MG/ML
40 INJECTION SUBCUTANEOUS ONCE
Refills: 0 | Status: COMPLETED | OUTPATIENT
Start: 2019-07-23 | End: 2019-07-23

## 2019-07-23 RX ORDER — LISINOPRIL 2.5 MG/1
1 TABLET ORAL
Qty: 0 | Refills: 0 | DISCHARGE
Start: 2019-07-23

## 2019-07-23 RX ORDER — OXYCODONE HYDROCHLORIDE 5 MG/1
5 TABLET ORAL EVERY 4 HOURS
Refills: 0 | Status: DISCONTINUED | OUTPATIENT
Start: 2019-07-23 | End: 2019-07-23

## 2019-07-23 RX ORDER — SODIUM,POTASSIUM PHOSPHATES 278-250MG
1 POWDER IN PACKET (EA) ORAL
Refills: 0 | Status: DISCONTINUED | OUTPATIENT
Start: 2019-07-23 | End: 2019-07-23

## 2019-07-23 RX ADMIN — HEPARIN SODIUM 5000 UNIT(S): 5000 INJECTION INTRAVENOUS; SUBCUTANEOUS at 06:50

## 2019-07-23 RX ADMIN — ESCITALOPRAM OXALATE 20 MILLIGRAM(S): 10 TABLET, FILM COATED ORAL at 11:36

## 2019-07-23 RX ADMIN — LISINOPRIL 40 MILLIGRAM(S): 2.5 TABLET ORAL at 06:50

## 2019-07-23 RX ADMIN — Medication 50 MILLIGRAM(S): at 08:57

## 2019-07-23 RX ADMIN — ENOXAPARIN SODIUM 40 MILLIGRAM(S): 100 INJECTION SUBCUTANEOUS at 11:35

## 2019-07-23 RX ADMIN — Medication 1 TABLET(S): at 17:20

## 2019-07-23 RX ADMIN — Medication 400 MILLIGRAM(S): at 11:36

## 2019-07-23 RX ADMIN — Medication 100 MILLIGRAM(S): at 06:50

## 2019-07-23 RX ADMIN — BUDESONIDE AND FORMOTEROL FUMARATE DIHYDRATE 2 PUFF(S): 160; 4.5 AEROSOL RESPIRATORY (INHALATION) at 09:25

## 2019-07-23 RX ADMIN — Medication 400 MILLIGRAM(S): at 12:20

## 2019-07-23 RX ADMIN — OXYCODONE AND ACETAMINOPHEN 2 TABLET(S): 5; 325 TABLET ORAL at 03:00

## 2019-07-23 RX ADMIN — Medication 1 PACKET(S): at 17:20

## 2019-07-23 RX ADMIN — PIPERACILLIN AND TAZOBACTAM 25 GRAM(S): 4; .5 INJECTION, POWDER, LYOPHILIZED, FOR SOLUTION INTRAVENOUS at 06:50

## 2019-07-23 RX ADMIN — Medication 100 MILLIGRAM(S): at 11:35

## 2019-07-23 RX ADMIN — OXYCODONE AND ACETAMINOPHEN 2 TABLET(S): 5; 325 TABLET ORAL at 02:08

## 2019-07-23 RX ADMIN — Medication 50 MILLIGRAM(S): at 15:00

## 2019-07-23 NOTE — SBIRT NOTE ADULT - NSSBIRTBRIEFINTDET_GEN_A_CORE
Pt educated on risky alcohol consumption. Pt minimizes his drinking habits, states he's 69 y/o, probably has "10 years left" and wants to enjoy it. Pt declined resources and referral.

## 2019-07-23 NOTE — PROGRESS NOTE ADULT - ASSESSMENT
The patient is a 68y Male who is s/p laparoscopic appendectomy, doing well post-operatively      - Pain control as needed  - DVT ppx  - OOB and ambulating as tolerated  - 4 days of Zosyn  - F/u AM labs  - PT

## 2019-07-23 NOTE — PHYSICAL THERAPY INITIAL EVALUATION ADULT - PERTINENT HX OF CURRENT PROBLEM, REHAB EVAL
Pt presents to [facility] with reports of worsening abdominal pain

## 2019-07-23 NOTE — DISCHARGE NOTE PROVIDER - NSDCFUADDINST_GEN_ALL_CORE_FT
WOUND CARE: Do not peel off steri strips, they will fall off on their own.   BATHING: Please do not submerge wound underwater. You may shower and/or sponge bathe.   ACTIVITY: No heavy lifting or straining. Otherwise, you may return to your usual level of physical activity. If you are taking narcotic pain medication (such as oxycodone) DO NOT drive a car, operate machinery or make important decisions.  DIET: Return to your usual diet.  NOTIFY YOUR SURGEON IF: You have any bleeding that does not stop, any pus draining from your wound(s), any fever (over 100.4 F) or chills, persistent nausea/vomiting, persistent diarrhea, or if your pain is not controlled on your discharge pain medications.  FOLLOW-UP: Please follow up with ACS clinic in 1-2 weeks/ Call for appointment upon discharge.   A prescription for antibiotics have been sent to your pharmacy, please continue a 5 day course.

## 2019-07-23 NOTE — DISCHARGE NOTE PROVIDER - CARE PROVIDER_API CALL
[facility],   [address], [place], [address]  Phone: [phone]  Fax: (   )    -  Follow Up Time:

## 2019-07-23 NOTE — DISCHARGE NOTE PROVIDER - HOSPITAL COURSE
7/21 - 68 year old male with chief complaint of abdominal pain x3 days. Reports that the pain started in the periumbilical region and migrated to RLQ. Describes pain as sharp, radiating, intermittent rated 6/10 in severity. Pain is associated with anorexia x 2 days. ROS negative for fever, chills, nausea, vomiting, chest pain, dyspnea dysuria or changes in bowel habits.     Patient admitted to ACS service for acute appendicitis.     Patient was pre-op'd for OR.     Patient went to OR 7/21 and was found to have perforated appendicitis.     Social work consulted for ETOH, cleared for d/c home, patient does not want any services.     Appropriate medications sent to pharmacy.

## 2019-07-23 NOTE — DISCHARGE NOTE NURSING/CASE MANAGEMENT/SOCIAL WORK - NSDCDPATPORTLINK_GEN_ALL_CORE
You can access the [facility] Patient Portal, offered by [facility], by registering with the following website: [email or web address]

## 2019-07-23 NOTE — DISCHARGE NOTE PROVIDER - PROVIDER TOKENS
FREE:[LAST:[[facility]],PHONE:[phone]],FAX:[(   [phone]    -],ADDRESS:[[address] [place] [address], [place], [place], [address]]]

## 2019-07-23 NOTE — PROGRESS NOTE ADULT - SUBJECTIVE AND OBJECTIVE BOX
SUBJECTIVE: ANTWAN overnight. Pain is well controlled. Tolerating diet, denies nausea/vomiting,      MEDICATIONS  (STANDING):  atorvastatin 10 milliGRAM(s) Oral at bedtime  buDESOnide  80 MICROgram(s)/formoterol 4.5 MICROgram(s) Inhaler 2 Puff(s) Inhalation two times a day  docusate sodium 100 milliGRAM(s) Oral three times a day  doxazosin 4 milliGRAM(s) Oral at bedtime  escitalopram 20 milliGRAM(s) Oral daily  heparin  Injectable 5000 Unit(s) SubCutaneous every 8 hours  lisinopril 40 milliGRAM(s) Oral daily  ondansetron Injectable 8 milliGRAM(s) IV Push two times a day  piperacillin/tazobactam IVPB.. 3.375 Gram(s) IV Intermittent every 8 hours    MEDICATIONS  (PRN):  famotidine    Tablet 20 milliGRAM(s) Oral every 12 hours PRN Dyspepsia  ibuprofen  Tablet. 400 milliGRAM(s) Oral every 8 hours PRN Mild Pain (1 - 3)  LORazepam     Tablet 1 milliGRAM(s) Oral every 4 hours PRN CIWA-Ar score increase by 2 points and a total score of 7 or less  LORazepam     Tablet 1 milliGRAM(s) Oral every 3 hours PRN CIWA-Ar score 8 or greater  LORazepam   Injectable 2 milliGRAM(s) IV Push every 6 hours PRN Symptom-triggered: 2 point increase in CIWA -Ar score and a total score of 7 or LESS  oxyCODONE    5 mG/acetaminophen 325 mG 1 Tablet(s) Oral every 4 hours PRN Moderate Pain (4 - 6)  oxyCODONE    5 mG/acetaminophen 325 mG 2 Tablet(s) Oral every 6 hours PRN Severe Pain (7 - 10)      Vital Signs Last 24 Hrs  T(C): 36.6 (22 Jul 2019 23:25), Max: 36.9 (22 Jul 2019 21:35)  T(F): 97.9 (22 Jul 2019 23:25), Max: 98.4 (22 Jul 2019 21:35)  HR: 82 (22 Jul 2019 23:25) (68 - 82)  BP: 153/83 (22 Jul 2019 23:25) (123/75 - 153/83)  BP(mean): --  RR: 20 (22 Jul 2019 23:25) (18 - 20)  SpO2: 92% (22 Jul 2019 23:25) (92% - 95%)    PE  Physical Exam:  General: NAD, resting comfortably in bed  Pulmonary: Nonlabored breathing, no respiratory distress  Cardiovascular: NSR  Abdominal: soft, ND, Incisions c/d/i, appropriately tender at incisional sites  Extremities: WWP      I&O's Detail    21 Jul 2019 07:01  -  22 Jul 2019 07:00  --------------------------------------------------------  IN:    sodium chloride 0.9%: 1320 mL    Solution: 200 mL  Total IN: 1520 mL    OUT:    Estimated Blood Loss: 5 mL    Intermittent Catheterization - Urethral: 650 mL    Voided: 250 mL  Total OUT: 905 mL    Total NET: 615 mL      22 Jul 2019 07:01  -  23 Jul 2019 01:11  --------------------------------------------------------  IN:  Total IN: 0 mL    OUT:    Voided: 125 mL  Total OUT: 125 mL    Total NET: -125 mL          LABS:                        13.2   17.85 )-----------( 182      ( 22 Jul 2019 06:26 )             39.4     07-22    134<L>  |  98  |  16.0  ----------------------------<  136<H>  4.3   |  24.0  |  0.98    Ca    9.0      22 Jul 2019 19:36  Phos  2.3     07-22  Mg     2.0     07-22    TPro  6.6  /  Alb  3.5  /  TBili  0.6  /  DBili  0.2  /  AST  19  /  ALT  16  /  AlkPhos  41  07-22          RADIOLOGY & ADDITIONAL STUDIES:

## 2019-07-24 LAB — SURGICAL PATHOLOGY STUDY: SIGNIFICANT CHANGE UP

## 2019-07-30 ENCOUNTER — APPOINTMENT (OUTPATIENT)
Dept: TRAUMA SURGERY | Facility: CLINIC | Age: 69
End: 2019-07-30

## 2019-09-04 NOTE — CDI QUERY NOTE - NSCDIOTHERTXTBX_GEN_ALL_CORE_HH
This patient was admitted on 7/21/19 with a h&H of 14/41.6.  He then had surgery on for a ruptured appendix on the evening of 7/21/19.  H&H post -op 12.1/36.4  please clarify a diagnosis in your discharge note  A. Acute blood loss anemia  B. Precipitous drop in hematocrit  C. Not clinically significant  D. Other______________________________    Thank you

## 2019-09-29 PROCEDURE — 88304 TISSUE EXAM BY PATHOLOGIST: CPT

## 2019-09-29 PROCEDURE — 87521 HEPATITIS C PROBE&RVRS TRNSC: CPT

## 2019-09-29 PROCEDURE — 83735 ASSAY OF MAGNESIUM: CPT

## 2019-09-29 PROCEDURE — 80048 BASIC METABOLIC PNL TOTAL CA: CPT

## 2019-09-29 PROCEDURE — 85027 COMPLETE CBC AUTOMATED: CPT

## 2019-09-29 PROCEDURE — 99285 EMERGENCY DEPT VISIT HI MDM: CPT | Mod: 25

## 2019-09-29 PROCEDURE — 94640 AIRWAY INHALATION TREATMENT: CPT

## 2019-09-29 PROCEDURE — 74177 CT ABD & PELVIS W/CONTRAST: CPT

## 2019-09-29 PROCEDURE — 86850 RBC ANTIBODY SCREEN: CPT

## 2019-09-29 PROCEDURE — 80053 COMPREHEN METABOLIC PANEL: CPT

## 2019-09-29 PROCEDURE — 97163 PT EVAL HIGH COMPLEX 45 MIN: CPT

## 2019-09-29 PROCEDURE — 86803 HEPATITIS C AB TEST: CPT

## 2019-09-29 PROCEDURE — 36415 COLL VENOUS BLD VENIPUNCTURE: CPT

## 2019-09-29 PROCEDURE — 86901 BLOOD TYPING SEROLOGIC RH(D): CPT

## 2019-09-29 PROCEDURE — 80076 HEPATIC FUNCTION PANEL: CPT

## 2019-09-29 PROCEDURE — 93005 ELECTROCARDIOGRAM TRACING: CPT

## 2019-09-29 PROCEDURE — 84100 ASSAY OF PHOSPHORUS: CPT

## 2019-09-29 PROCEDURE — 86900 BLOOD TYPING SEROLOGIC ABO: CPT

## 2019-09-29 PROCEDURE — 83690 ASSAY OF LIPASE: CPT

## 2019-09-29 PROCEDURE — 96374 THER/PROPH/DIAG INJ IV PUSH: CPT | Mod: XU

## 2019-09-29 PROCEDURE — 96375 TX/PRO/DX INJ NEW DRUG ADDON: CPT

## 2019-10-17 ENCOUNTER — RX RENEWAL (OUTPATIENT)
Age: 69
End: 2019-10-17

## 2021-01-15 DIAGNOSIS — Z87.19 PERSONAL HISTORY OF OTHER DISEASES OF THE DIGESTIVE SYSTEM: ICD-10-CM

## 2021-01-15 PROBLEM — E78.5 HYPERLIPIDEMIA, UNSPECIFIED: Chronic | Status: ACTIVE | Noted: 2019-07-21

## 2021-01-15 PROBLEM — I10 ESSENTIAL (PRIMARY) HYPERTENSION: Chronic | Status: ACTIVE | Noted: 2019-07-21

## 2021-01-19 ENCOUNTER — APPOINTMENT (OUTPATIENT)
Dept: CARDIOLOGY | Facility: CLINIC | Age: 71
End: 2021-01-19
Payer: MEDICARE

## 2021-01-19 ENCOUNTER — TRANSCRIPTION ENCOUNTER (OUTPATIENT)
Age: 71
End: 2021-01-19

## 2021-01-19 ENCOUNTER — NON-APPOINTMENT (OUTPATIENT)
Age: 71
End: 2021-01-19

## 2021-01-19 VITALS
WEIGHT: 216 LBS | BODY MASS INDEX: 30.24 KG/M2 | DIASTOLIC BLOOD PRESSURE: 80 MMHG | OXYGEN SATURATION: 97 % | TEMPERATURE: 98.4 F | HEART RATE: 70 BPM | SYSTOLIC BLOOD PRESSURE: 134 MMHG | HEIGHT: 71 IN

## 2021-01-19 VITALS — SYSTOLIC BLOOD PRESSURE: 128 MMHG | DIASTOLIC BLOOD PRESSURE: 72 MMHG

## 2021-01-19 DIAGNOSIS — Z86.79 PERSONAL HISTORY OF OTHER DISEASES OF THE CIRCULATORY SYSTEM: ICD-10-CM

## 2021-01-19 DIAGNOSIS — R53.83 OTHER FATIGUE: ICD-10-CM

## 2021-01-19 DIAGNOSIS — Z83.3 FAMILY HISTORY OF DIABETES MELLITUS: ICD-10-CM

## 2021-01-19 PROCEDURE — 99205 OFFICE O/P NEW HI 60 MIN: CPT

## 2021-01-19 PROCEDURE — 93000 ELECTROCARDIOGRAM COMPLETE: CPT

## 2021-01-19 RX ORDER — TERAZOSIN HCL 2 MG
TABLET ORAL
Refills: 0 | Status: DISCONTINUED | COMMUNITY
End: 2021-01-19

## 2021-01-19 RX ORDER — BUDESONIDE AND FORMOTEROL FUMARATE DIHYDRATE 160; 4.5 UG/1; UG/1
AEROSOL RESPIRATORY (INHALATION)
Refills: 0 | Status: DISCONTINUED | COMMUNITY
End: 2021-01-19

## 2021-01-19 RX ORDER — ATORVASTATIN CALCIUM 10 MG/1
10 TABLET, FILM COATED ORAL
Refills: 0 | Status: ACTIVE | COMMUNITY
Start: 2020-04-06

## 2021-01-19 RX ORDER — ROSUVASTATIN CALCIUM 5 MG/1
TABLET, FILM COATED ORAL
Refills: 0 | Status: DISCONTINUED | COMMUNITY
End: 2021-01-19

## 2021-01-19 RX ORDER — FENOFIBRATE 160 MG/1
160 TABLET ORAL
Qty: 90 | Refills: 0 | Status: ACTIVE | COMMUNITY
Start: 2020-06-16

## 2021-01-19 RX ORDER — TAMSULOSIN HYDROCHLORIDE 0.4 MG/1
0.4 CAPSULE ORAL
Qty: 30 | Refills: 1 | Status: ACTIVE | COMMUNITY
Start: 2021-01-13

## 2021-01-19 RX ORDER — DONEPEZIL HYDROCHLORIDE 5 MG/1
5 TABLET ORAL
Qty: 30 | Refills: 0 | Status: DISCONTINUED | COMMUNITY
Start: 2018-02-22 | End: 2021-01-19

## 2021-01-19 NOTE — HISTORY OF PRESENT ILLNESS
[FreeTextEntry1] : Abnormal EKG, atrial fibrillation \par \par \par This is a 70 year old male with history of hypertension and dyslipidemia , prior smoking, quit 20 year s agom, alcohol abuse, here with chest pain and dyspnea on exertion and new onset afib. \par \par chest pain.  Onset: months.   Type: Pressure ; Duration: intermittent days. intensity: 1/10, lasted for : couple of hours.;   Radiation:  none  Associated symptoms: + Dyspnea. \par No chest paion on walking. on going up stairs. Dyspnea not gettging worse. PMD taking care of that.\par alcohol use : 6 canes every day for last few years.\par \par + palpitations. irrengular heart beat.  + dizziness.\par + snoring.  he wakes up 2-3 time a night. going to bathroom.   he takes naps during day time. day time sleepiness. \par \par

## 2021-01-19 NOTE — PHYSICAL EXAM
[General Appearance - Well Developed] : well developed [Normal Appearance] : normal appearance [Well Groomed] : well groomed [General Appearance - Well Nourished] : well nourished [No Deformities] : no deformities [General Appearance - In No Acute Distress] : no acute distress [Normal Conjunctiva] : the conjunctiva exhibited no abnormalities [Eyelids - No Xanthelasma] : the eyelids demonstrated no xanthelasmas [Normal Oral Mucosa] : normal oral mucosa [No Oral Pallor] : no oral pallor [No Oral Cyanosis] : no oral cyanosis [Normal Jugular Venous A Waves Present] : normal jugular venous A waves present [Normal Jugular Venous V Waves Present] : normal jugular venous V waves present [No Jugular Venous Smith A Waves] : no jugular venous smith A waves [Heart Rate And Rhythm] : heart rate and rhythm were normal [Heart Sounds] : normal S1 and S2 [Murmurs] : no murmurs present [Respiration, Rhythm And Depth] : normal respiratory rhythm and effort [Exaggerated Use Of Accessory Muscles For Inspiration] : no accessory muscle use [Auscultation Breath Sounds / Voice Sounds] : lungs were clear to auscultation bilaterally [Abdomen Soft] : soft [Abdomen Tenderness] : non-tender [Abdomen Mass (___ Cm)] : no abdominal mass palpated [Nail Clubbing] : no clubbing of the fingernails [Cyanosis, Localized] : no localized cyanosis [Petechial Hemorrhages (___cm)] : no petechial hemorrhages [Skin Color & Pigmentation] : normal skin color and pigmentation [] : no rash [No Venous Stasis] : no venous stasis [Skin Lesions] : no skin lesions [No Skin Ulcers] : no skin ulcer [No Xanthoma] : no  xanthoma was observed [Affect] : the affect was normal [Oriented To Time, Place, And Person] : oriented to person, place, and time [Mood] : the mood was normal [No Anxiety] : not feeling anxious

## 2021-01-19 NOTE — REVIEW OF SYSTEMS
[see HPI] : see HPI [Shortness Of Breath] : shortness of breath [Dyspnea on exertion] : dyspnea during exertion [Chest Pain] : chest pain [Negative] : Heme/Lymph

## 2021-01-19 NOTE — DISCUSSION/SUMMARY
[Patient] : the patient [Risks] : risks [Benefits] : benefits [Alternatives] : alternatives [___ Month(s)] : [unfilled] month(s) [With Me] : with me [FreeTextEntry1] : This is a 70 year old male with history of hypertension and dyslipidemia , alcohol abuse, prior smoking, here with day time sleepiness, and chest pain , and dyspnea on exertion , \par \par 1) Atrial fibrillation : paroxysmal. CHADSVASC : 2 . on eliquis 5 mg Q12.  ct toprol 50 mg daily.  2D echo. \par tralcohol retriction. sleep apnea treatment. \par 2) chest pain and dyspnea on exertion : intermediate risk factors for coronary artery disease.  echocardiogram with contrast if needed.   Nuclear stress test with IV technetium radiotracer. \par  \par 3) .daytieme sleepiness: and snoring: Sleep study\par 4) hypertension : ct ramipril. \par 5) dyslipidemia : ct fenofirbate, and ct statins. \par 6) Smoking:  US abdomine next visit\par 7) dizziness: carotid US next visit. \par \par

## 2021-01-19 NOTE — REASON FOR VISIT
[Initial Evaluation] : an initial evaluation of [FreeTextEntry2] : Abnormal EKG , chest pain , atrial fibrillation  [FreeTextEntry1] : Abnormal EKG , chest pain , atrial fibrillation

## 2021-01-22 ENCOUNTER — OUTPATIENT (OUTPATIENT)
Dept: OUTPATIENT SERVICES | Facility: HOSPITAL | Age: 71
LOS: 1 days | End: 2021-01-22
Payer: MEDICARE

## 2021-01-22 DIAGNOSIS — G47.30 SLEEP APNEA, UNSPECIFIED: ICD-10-CM

## 2021-01-22 DIAGNOSIS — R40.0 SOMNOLENCE: ICD-10-CM

## 2021-01-22 PROCEDURE — G0399: CPT

## 2021-01-22 PROCEDURE — 95806 SLEEP STUDY UNATT&RESP EFFT: CPT

## 2021-01-22 PROCEDURE — 95806 SLEEP STUDY UNATT&RESP EFFT: CPT | Mod: 26

## 2021-02-09 ENCOUNTER — APPOINTMENT (OUTPATIENT)
Dept: CARDIOLOGY | Facility: CLINIC | Age: 71
End: 2021-02-09
Payer: MEDICARE

## 2021-02-09 PROCEDURE — A9500: CPT

## 2021-02-09 PROCEDURE — 78452 HT MUSCLE IMAGE SPECT MULT: CPT

## 2021-02-09 PROCEDURE — 93015 CV STRESS TEST SUPVJ I&R: CPT

## 2021-02-09 PROCEDURE — 93306 TTE W/DOPPLER COMPLETE: CPT

## 2021-02-12 ENCOUNTER — TRANSCRIPTION ENCOUNTER (OUTPATIENT)
Age: 71
End: 2021-02-12

## 2021-02-17 ENCOUNTER — APPOINTMENT (OUTPATIENT)
Dept: PULMONOLOGY | Facility: CLINIC | Age: 71
End: 2021-02-17
Payer: MEDICARE

## 2021-02-17 VITALS
HEART RATE: 65 BPM | RESPIRATION RATE: 16 BRPM | OXYGEN SATURATION: 96 % | DIASTOLIC BLOOD PRESSURE: 60 MMHG | SYSTOLIC BLOOD PRESSURE: 120 MMHG | WEIGHT: 216 LBS | BODY MASS INDEX: 30.13 KG/M2

## 2021-02-17 PROCEDURE — 99204 OFFICE O/P NEW MOD 45 MIN: CPT

## 2021-04-20 NOTE — HISTORY OF PRESENT ILLNESS
[Obstructive Sleep Apnea] : obstructive sleep apnea [Daytime Somnolence] : daytime somnolence [Nonrestorative Sleep] : nonrestorative sleep [Snoring] : snoring [Tired while Driving] : tired while driving [TextBox_4] : 60 pack year smoker - DC in 2005\par HTN, atrial fibrillation\par Sent for HST by Dr Paulson\par Here for evaluation and Rx\par On Trelegy for COPD by Dr Alan  [ESS] : 10 regular

## 2021-04-20 NOTE — DISCUSSION/SUMMARY
[FreeTextEntry1] : At least moderate BRANDIN (HST shows significantly High AHI second 1/2 of night when he was actually sleeping.  Significant desaturation also noted during the second 1/2 of the study night\par Obesity

## 2021-04-21 ENCOUNTER — APPOINTMENT (OUTPATIENT)
Dept: PULMONOLOGY | Facility: CLINIC | Age: 71
End: 2021-04-21
Payer: MEDICARE

## 2021-04-21 VITALS — DIASTOLIC BLOOD PRESSURE: 86 MMHG | SYSTOLIC BLOOD PRESSURE: 140 MMHG | BODY MASS INDEX: 28.03 KG/M2 | WEIGHT: 201 LBS

## 2021-04-21 DIAGNOSIS — Z23 ENCOUNTER FOR IMMUNIZATION: ICD-10-CM

## 2021-04-21 PROCEDURE — 99214 OFFICE O/P EST MOD 30 MIN: CPT

## 2021-04-21 NOTE — DISCUSSION/SUMMARY
[FreeTextEntry1] : At least moderate BARNDIN (HST shows significantly High AHI second 1/2 of night when he was actually sleeping.  Significant desaturation also noted during the second 1/2 of the study night\par CPAP failure\par Obesity \par Will refer for oral appliance \par Consider additional UPPP if needed

## 2021-04-21 NOTE — HISTORY OF PRESENT ILLNESS
[Obstructive Sleep Apnea] : obstructive sleep apnea [Daytime Somnolence] : daytime somnolence [Nonrestorative Sleep] : nonrestorative sleep [Snoring] : snoring [Tired while Driving] : tired while driving [TextBox_4] : 60 pack year smoker - [place] in 2005\par HTN, atrial fibrillation\par Sent for HST by Dr Paulson\par Here for evaluation and Rx\par On Trelegy for COPD by Dr Alan \par \par 4/21/21\par Never able to get good seals with mask\par Wants to DC CPAP\par Will consider an oral appliance \par \par  [ESS] : 10

## 2021-05-25 ENCOUNTER — APPOINTMENT (OUTPATIENT)
Dept: CARDIOLOGY | Facility: CLINIC | Age: 71
End: 2021-05-25
Payer: MEDICARE

## 2021-05-25 ENCOUNTER — NON-APPOINTMENT (OUTPATIENT)
Age: 71
End: 2021-05-25

## 2021-05-25 VITALS
HEIGHT: 71 IN | SYSTOLIC BLOOD PRESSURE: 118 MMHG | TEMPERATURE: 98.2 F | HEART RATE: 58 BPM | WEIGHT: 226 LBS | BODY MASS INDEX: 31.64 KG/M2 | OXYGEN SATURATION: 96 % | DIASTOLIC BLOOD PRESSURE: 72 MMHG

## 2021-05-25 PROCEDURE — 93000 ELECTROCARDIOGRAM COMPLETE: CPT

## 2021-05-25 PROCEDURE — 99215 OFFICE O/P EST HI 40 MIN: CPT

## 2021-05-25 RX ORDER — LORAZEPAM 2 MG/1
TABLET ORAL 3 TIMES DAILY
Refills: 0 | Status: DISCONTINUED | COMMUNITY
End: 2021-05-25

## 2021-05-25 RX ORDER — TRAMADOL HYDROCHLORIDE 50 MG/1
50 TABLET, COATED ORAL
Qty: 14 | Refills: 0 | Status: DISCONTINUED | COMMUNITY
Start: 2021-01-13 | End: 2021-05-25

## 2021-05-25 NOTE — PHYSICAL EXAM
[General Appearance - Well Developed] : well developed [Normal Appearance] : normal appearance [Well Groomed] : well groomed [General Appearance - Well Nourished] : well nourished [No Deformities] : no deformities [General Appearance - In No Acute Distress] : no acute distress [Normal Conjunctiva] : the conjunctiva exhibited no abnormalities [Eyelids - No Xanthelasma] : the eyelids demonstrated no xanthelasmas [Normal Oral Mucosa] : normal oral mucosa [No Oral Pallor] : no oral pallor [No Oral Cyanosis] : no oral cyanosis [Normal Jugular Venous A Waves Present] : normal jugular venous A waves present [Normal Jugular Venous V Waves Present] : normal jugular venous V waves present [No Jugular Venous Smith A Waves] : no jugular venous smith A waves [Heart Rate And Rhythm] : heart rate and rhythm were normal [Heart Sounds] : normal S1 and S2 [Murmurs] : no murmurs present [Respiration, Rhythm And Depth] : normal respiratory rhythm and effort [Exaggerated Use Of Accessory Muscles For Inspiration] : no accessory muscle use [Auscultation Breath Sounds / Voice Sounds] : lungs were clear to auscultation bilaterally [Abdomen Soft] : soft [Abdomen Tenderness] : non-tender [Abdomen Mass (___ Cm)] : no abdominal mass palpated [Nail Clubbing] : no clubbing of the fingernails [Cyanosis, Localized] : no localized cyanosis [Petechial Hemorrhages (___cm)] : no petechial hemorrhages [Skin Color & Pigmentation] : normal skin color and pigmentation [] : no rash [No Venous Stasis] : no venous stasis [Skin Lesions] : no skin lesions [No Skin Ulcers] : no skin ulcer [No Xanthoma] : no  xanthoma was observed [Oriented To Time, Place, And Person] : oriented to person, place, and time [Affect] : the affect was normal [Mood] : the mood was normal [No Anxiety] : not feeling anxious

## 2021-05-25 NOTE — DISCUSSION/SUMMARY
[Patient] : the patient [Risks] : risks [Benefits] : benefits [Alternatives] : alternatives [With Me] : with me [___ Month(s)] : in [unfilled] month(s) [FreeTextEntry1] : This is a 70 year old male with history of hypertension and dyslipidemia , alcohol abuse, prior smoking, here with day time sleepiness, and chest pain , and dyspnea on exertion , \par \par 1) Atrial fibrillation : paroxysmal. CHADSVASC : 2 . on eliquis 5 mg Q12.  ct toprol 50 mg daily. \par 2) chest pain and dyspnea on exertion : no ischemia. \par 4) hypertension : ct ramipril. \par 5) dyslipidemia : ct fenofirbate, and ct statins. \par 6) Smoking:  AAA screening.  > 50 packe year history. \par 7) dizziness: carotid US \par 8) carotid plaque: Dizziness: Cariotid US: \par

## 2021-05-25 NOTE — CARDIOLOGY SUMMARY
[de-identified] : 5/25/2021   [de-identified] : feb 2021:  mild LVH.  grade I    diastolic dysfunction  normal Rv.  no significant valvular abnormality  [de-identified] : 2/29/2021:  Nuclear stress test: No ischemia. raz nuclear.  LVEf 72%.  [de-identified] : cartoid Duplex:  2017: smal calcified  left ICA  [___] : [unfilled]

## 2021-05-25 NOTE — REASON FOR VISIT
[FreeTextEntry1] : Abnormal EKG , chest pain , atrial fibrillation  [Initial Evaluation] : an initial evaluation of [FreeTextEntry2] : Abnormal EKG , chest pain , atrial fibrillation

## 2021-05-25 NOTE — HISTORY OF PRESENT ILLNESS
[FreeTextEntry1] : Abnormal EKG, atrial fibrillation \par \par \par HPI for today: : compliant with meds.  no bleeding episodes. no headaches.\par occasional  dizziness. no syncope. \par dizziness especially when he gets up to o fast.  no syncope. \par \par \par old note: This is a 70 year old male with history of hypertension and dyslipidemia , prior smoking, quit 20 year s agom, alcohol abuse, here with chest pain and dyspnea on exertion and new onset afib. \par \par chest pain.  Onset: months.   Type: Pressure ; Duration: intermittent days. intensity: 1/10, lasted for : couple of hours.;   Radiation:  none  Associated symptoms: + Dyspnea. \par No chest paion on walking. on going up stairs. Dyspnea not gettging worse. PMD taking care of that.\par alcohol use : 6 canes every day for last few years.\par \par + palpitations. irrengular heart beat.  + dizziness.\par + snoring.  he wakes up 2-3 time a night. going to bathroom.   he takes naps during day time. day time sleepiness. \par \par

## 2021-06-18 ENCOUNTER — APPOINTMENT (OUTPATIENT)
Dept: CARDIOLOGY | Facility: CLINIC | Age: 71
End: 2021-06-18
Payer: MEDICARE

## 2021-06-18 PROCEDURE — 93978 VASCULAR STUDY: CPT

## 2021-06-18 PROCEDURE — 93880 EXTRACRANIAL BILAT STUDY: CPT

## 2021-06-21 ENCOUNTER — NON-APPOINTMENT (OUTPATIENT)
Age: 71
End: 2021-06-21

## 2021-06-23 ENCOUNTER — TRANSCRIPTION ENCOUNTER (OUTPATIENT)
Age: 71
End: 2021-06-23

## 2021-07-21 ENCOUNTER — APPOINTMENT (OUTPATIENT)
Dept: PULMONOLOGY | Facility: CLINIC | Age: 71
End: 2021-07-21

## 2021-11-12 ENCOUNTER — INPATIENT (INPATIENT)
Facility: HOSPITAL | Age: 71
LOS: 1 days | Discharge: HOME | DRG: 177 | End: 2021-11-14
Attending: INTERNAL MEDICINE | Admitting: HOSPITALIST
Payer: MEDICARE

## 2021-11-12 VITALS
HEART RATE: 74 BPM | RESPIRATION RATE: 16 BRPM | DIASTOLIC BLOOD PRESSURE: 91 MMHG | OXYGEN SATURATION: 98 % | WEIGHT: 220.02 LBS | HEIGHT: 70 IN | TEMPERATURE: 100 F | SYSTOLIC BLOOD PRESSURE: 170 MMHG

## 2021-11-12 DIAGNOSIS — U07.1 COVID-19: ICD-10-CM

## 2021-11-12 LAB
ALBUMIN SERPL ELPH-MCNC: 3.8 G/DL — SIGNIFICANT CHANGE UP (ref 3.3–5.2)
ALP SERPL-CCNC: 56 U/L — SIGNIFICANT CHANGE UP (ref 40–120)
ALT FLD-CCNC: 37 U/L — SIGNIFICANT CHANGE UP
ANION GAP SERPL CALC-SCNC: 13 MMOL/L — SIGNIFICANT CHANGE UP (ref 5–17)
AST SERPL-CCNC: 41 U/L — HIGH
BASOPHILS # BLD AUTO: 0.06 K/UL — SIGNIFICANT CHANGE UP (ref 0–0.2)
BASOPHILS NFR BLD AUTO: 0.8 % — SIGNIFICANT CHANGE UP (ref 0–2)
BILIRUB SERPL-MCNC: 0.6 MG/DL — SIGNIFICANT CHANGE UP (ref 0.4–2)
BUN SERPL-MCNC: 11.4 MG/DL — SIGNIFICANT CHANGE UP (ref 8–20)
CALCIUM SERPL-MCNC: 8.6 MG/DL — SIGNIFICANT CHANGE UP (ref 8.6–10.2)
CHLORIDE SERPL-SCNC: 96 MMOL/L — LOW (ref 98–107)
CO2 SERPL-SCNC: 23 MMOL/L — SIGNIFICANT CHANGE UP (ref 22–29)
CREAT SERPL-MCNC: 0.96 MG/DL — SIGNIFICANT CHANGE UP (ref 0.5–1.3)
CRP SERPL-MCNC: 33 MG/L — HIGH
D DIMER BLD IA.RAPID-MCNC: <150 NG/ML DDU — SIGNIFICANT CHANGE UP
EOSINOPHIL # BLD AUTO: 0 K/UL — SIGNIFICANT CHANGE UP (ref 0–0.5)
EOSINOPHIL NFR BLD AUTO: 0 % — SIGNIFICANT CHANGE UP (ref 0–6)
FERRITIN SERPL-MCNC: 818 NG/ML — HIGH (ref 30–400)
FIBRINOGEN PPP-MCNC: 454 MG/DL — SIGNIFICANT CHANGE UP (ref 290–520)
GLUCOSE SERPL-MCNC: 146 MG/DL — HIGH (ref 70–99)
HCT VFR BLD CALC: 36.9 % — LOW (ref 39–50)
HGB BLD-MCNC: 12.8 G/DL — LOW (ref 13–17)
LYMPHOCYTES # BLD AUTO: 0.56 K/UL — LOW (ref 1–3.3)
LYMPHOCYTES # BLD AUTO: 7.9 % — LOW (ref 13–44)
MCHC RBC-ENTMCNC: 32.9 PG — SIGNIFICANT CHANGE UP (ref 27–34)
MCHC RBC-ENTMCNC: 34.7 GM/DL — SIGNIFICANT CHANGE UP (ref 32–36)
MCV RBC AUTO: 94.9 FL — SIGNIFICANT CHANGE UP (ref 80–100)
MONOCYTES # BLD AUTO: 0.87 K/UL — SIGNIFICANT CHANGE UP (ref 0–0.9)
MONOCYTES NFR BLD AUTO: 12.3 % — SIGNIFICANT CHANGE UP (ref 2–14)
NEUTROPHILS # BLD AUTO: 5.53 K/UL — SIGNIFICANT CHANGE UP (ref 1.8–7.4)
NEUTROPHILS NFR BLD AUTO: 77.2 % — HIGH (ref 43–77)
PLATELET # BLD AUTO: 145 K/UL — LOW (ref 150–400)
POTASSIUM SERPL-MCNC: 3.9 MMOL/L — SIGNIFICANT CHANGE UP (ref 3.5–5.3)
POTASSIUM SERPL-SCNC: 3.9 MMOL/L — SIGNIFICANT CHANGE UP (ref 3.5–5.3)
PROCALCITONIN SERPL-MCNC: 0.09 NG/ML — SIGNIFICANT CHANGE UP (ref 0.02–0.1)
PROT SERPL-MCNC: 6.7 G/DL — SIGNIFICANT CHANGE UP (ref 6.6–8.7)
RBC # BLD: 3.89 M/UL — LOW (ref 4.2–5.8)
RBC # FLD: 12.7 % — SIGNIFICANT CHANGE UP (ref 10.3–14.5)
SARS-COV-2 RNA SPEC QL NAA+PROBE: DETECTED
SODIUM SERPL-SCNC: 132 MMOL/L — LOW (ref 135–145)
WBC # BLD: 7.08 K/UL — SIGNIFICANT CHANGE UP (ref 3.8–10.5)
WBC # FLD AUTO: 7.08 K/UL — SIGNIFICANT CHANGE UP (ref 3.8–10.5)

## 2021-11-12 PROCEDURE — 71045 X-RAY EXAM CHEST 1 VIEW: CPT | Mod: 26

## 2021-11-12 PROCEDURE — 99223 1ST HOSP IP/OBS HIGH 75: CPT

## 2021-11-12 PROCEDURE — 99285 EMERGENCY DEPT VISIT HI MDM: CPT | Mod: CS

## 2021-11-12 RX ORDER — ACETAMINOPHEN 500 MG
975 TABLET ORAL ONCE
Refills: 0 | Status: COMPLETED | OUTPATIENT
Start: 2021-11-12 | End: 2021-11-12

## 2021-11-12 RX ORDER — LANOLIN ALCOHOL/MO/W.PET/CERES
3 CREAM (GRAM) TOPICAL AT BEDTIME
Refills: 0 | Status: DISCONTINUED | OUTPATIENT
Start: 2021-11-12 | End: 2021-11-14

## 2021-11-12 RX ORDER — ZINC SULFATE TAB 220 MG (50 MG ZINC EQUIVALENT) 220 (50 ZN) MG
220 TAB ORAL DAILY
Refills: 0 | Status: DISCONTINUED | OUTPATIENT
Start: 2021-11-12 | End: 2021-11-14

## 2021-11-12 RX ORDER — DEXAMETHASONE 0.5 MG/5ML
6 ELIXIR ORAL DAILY
Refills: 0 | Status: DISCONTINUED | OUTPATIENT
Start: 2021-11-12 | End: 2021-11-14

## 2021-11-12 RX ORDER — ENOXAPARIN SODIUM 100 MG/ML
40 INJECTION SUBCUTANEOUS EVERY 12 HOURS
Refills: 0 | Status: DISCONTINUED | OUTPATIENT
Start: 2021-11-12 | End: 2021-11-12

## 2021-11-12 RX ORDER — SODIUM CHLORIDE 9 MG/ML
3 INJECTION INTRAMUSCULAR; INTRAVENOUS; SUBCUTANEOUS EVERY 8 HOURS
Refills: 0 | Status: DISCONTINUED | OUTPATIENT
Start: 2021-11-12 | End: 2021-11-14

## 2021-11-12 RX ORDER — CHOLECALCIFEROL (VITAMIN D3) 125 MCG
400 CAPSULE ORAL
Refills: 0 | Status: DISCONTINUED | OUTPATIENT
Start: 2021-11-12 | End: 2021-11-14

## 2021-11-12 RX ORDER — DEXAMETHASONE 0.5 MG/5ML
6 ELIXIR ORAL ONCE
Refills: 0 | Status: COMPLETED | OUTPATIENT
Start: 2021-11-12 | End: 2021-11-12

## 2021-11-12 RX ORDER — REMDESIVIR 5 MG/ML
INJECTION INTRAVENOUS
Refills: 0 | Status: DISCONTINUED | OUTPATIENT
Start: 2021-11-12 | End: 2021-11-14

## 2021-11-12 RX ORDER — ONDANSETRON 8 MG/1
4 TABLET, FILM COATED ORAL EVERY 8 HOURS
Refills: 0 | Status: DISCONTINUED | OUTPATIENT
Start: 2021-11-12 | End: 2021-11-14

## 2021-11-12 RX ORDER — ACETAMINOPHEN 500 MG
650 TABLET ORAL EVERY 6 HOURS
Refills: 0 | Status: DISCONTINUED | OUTPATIENT
Start: 2021-11-12 | End: 2021-11-14

## 2021-11-12 RX ADMIN — Medication 6 MILLIGRAM(S): at 17:59

## 2021-11-12 RX ADMIN — SODIUM CHLORIDE 3 MILLILITER(S): 9 INJECTION INTRAMUSCULAR; INTRAVENOUS; SUBCUTANEOUS at 23:00

## 2021-11-12 RX ADMIN — Medication 975 MILLIGRAM(S): at 23:00

## 2021-11-12 NOTE — ED ADULT NURSE NOTE - OBJECTIVE STATEMENT
pt comes to ED with reports of increasing SOB and cough x few days, reports temp at home, hx COPD as well, states he took a home Covid test and was positive this AM. pt AOX3, SOB on exertion, vaccinated for Covid. pt with no extrmeity edema, no chest pain, no nausea/vomiting, + ill contacts at home.

## 2021-11-12 NOTE — ED ADULT NURSE REASSESSMENT NOTE - NS ED NURSE REASSESS COMMENT FT1
pt remains AOX3, resting comfortably with stable 02 sat on room air at rest. no complaints, even and unlabored resps present.

## 2021-11-12 NOTE — ED PROVIDER NOTE - OBJECTIVE STATEMENT
Pt is a 72 yo M co cough.  Pt states that for the past 2 d he has had cough, fever, body aches. Pt states that he took a rapid test at home today and was found to have covid.  Pt also states that he has mild chest pain. no fever/chills. no other complaints.

## 2021-11-12 NOTE — ED PROVIDER NOTE - CLINICAL SUMMARY MEDICAL DECISION MAKING FREE TEXT BOX
labs and imaging reviewed.  Pt with covid.  Pt hypoxic to 87% on minimal ambulation in the room. Pt given decadron. will admit to medicine for further management.

## 2021-11-12 NOTE — ED ADULT TRIAGE NOTE - CHIEF COMPLAINT QUOTE
pt BIBA from home for cough and fever, had + home covid test this am. vaccinated. denies cp, sob, resp distress. states took tylenol early this am

## 2021-11-12 NOTE — ED PROVIDER NOTE - NS ED ROS FT
+ fever no chills, No photophobia/eye pain/changes in vision, No ear pain/sore throat/dysphagia, No chest pain/palpitations, +SOB/cough no wheeze/stridor, No abdominal pain, No N/V/D, no dysuria/frequency/discharge, No neck/back pain, no rash, no changes in neurological status/function.

## 2021-11-12 NOTE — ED ADULT NURSE NOTE - NSIMPLEMENTINTERV_GEN_ALL_ED
Implemented All Universal Safety Interventions:  [place] to call system. Call bell, personal items and telephone within reach. Instruct patient to call for assistance. Room bathroom lighting operational. Non-slip footwear when patient is off stretcher. Physically safe environment: no spills, clutter or unnecessary equipment. Stretcher in lowest position, wheels locked, appropriate side rails in place.

## 2021-11-13 LAB
ALBUMIN SERPL ELPH-MCNC: 3.9 G/DL — SIGNIFICANT CHANGE UP (ref 3.3–5.2)
ALP SERPL-CCNC: 55 U/L — SIGNIFICANT CHANGE UP (ref 40–120)
ALT FLD-CCNC: 37 U/L — SIGNIFICANT CHANGE UP
ANION GAP SERPL CALC-SCNC: 13 MMOL/L — SIGNIFICANT CHANGE UP (ref 5–17)
AST SERPL-CCNC: 39 U/L — SIGNIFICANT CHANGE UP
BILIRUB DIRECT SERPL-MCNC: 0.3 MG/DL — SIGNIFICANT CHANGE UP (ref 0–0.3)
BILIRUB INDIRECT FLD-MCNC: 0.3 MG/DL — SIGNIFICANT CHANGE UP (ref 0.2–1)
BILIRUB SERPL-MCNC: 0.6 MG/DL — SIGNIFICANT CHANGE UP (ref 0.4–2)
BUN SERPL-MCNC: 12.1 MG/DL — SIGNIFICANT CHANGE UP (ref 8–20)
CALCIUM SERPL-MCNC: 8.9 MG/DL — SIGNIFICANT CHANGE UP (ref 8.6–10.2)
CHLORIDE SERPL-SCNC: 98 MMOL/L — SIGNIFICANT CHANGE UP (ref 98–107)
CO2 SERPL-SCNC: 21 MMOL/L — LOW (ref 22–29)
COVID-19 NUCLEOCAPSID GAM AB INTERP: NEGATIVE — SIGNIFICANT CHANGE UP
COVID-19 NUCLEOCAPSID TOTAL GAM ANTIBODY RESULT: 0.08 INDEX — SIGNIFICANT CHANGE UP
COVID-19 SPIKE DOMAIN AB INTERP: POSITIVE
COVID-19 SPIKE DOMAIN ANTIBODY RESULT: >250 U/ML — HIGH
CREAT SERPL-MCNC: 0.88 MG/DL — SIGNIFICANT CHANGE UP (ref 0.5–1.3)
CREAT SERPL-MCNC: SIGNIFICANT CHANGE UP MG/DL (ref 0.5–1.3)
GLUCOSE SERPL-MCNC: 207 MG/DL — HIGH (ref 70–99)
HCT VFR BLD CALC: 38.2 % — LOW (ref 39–50)
HGB BLD-MCNC: 12.8 G/DL — LOW (ref 13–17)
INR BLD: 1.6 RATIO — HIGH (ref 0.88–1.16)
MCHC RBC-ENTMCNC: 32.3 PG — SIGNIFICANT CHANGE UP (ref 27–34)
MCHC RBC-ENTMCNC: 33.5 GM/DL — SIGNIFICANT CHANGE UP (ref 32–36)
MCV RBC AUTO: 96.5 FL — SIGNIFICANT CHANGE UP (ref 80–100)
PLATELET # BLD AUTO: 165 K/UL — SIGNIFICANT CHANGE UP (ref 150–400)
POTASSIUM SERPL-MCNC: 4.3 MMOL/L — SIGNIFICANT CHANGE UP (ref 3.5–5.3)
POTASSIUM SERPL-SCNC: 4.3 MMOL/L — SIGNIFICANT CHANGE UP (ref 3.5–5.3)
PROT SERPL-MCNC: 7 G/DL — SIGNIFICANT CHANGE UP (ref 6.6–8.7)
PROTHROM AB SERPL-ACNC: 18.2 SEC — HIGH (ref 10.6–13.6)
RBC # BLD: 3.96 M/UL — LOW (ref 4.2–5.8)
RBC # FLD: 12.9 % — SIGNIFICANT CHANGE UP (ref 10.3–14.5)
SARS-COV-2 IGG+IGM SERPL QL IA: 0.08 INDEX — SIGNIFICANT CHANGE UP
SARS-COV-2 IGG+IGM SERPL QL IA: >250 U/ML — HIGH
SARS-COV-2 IGG+IGM SERPL QL IA: NEGATIVE — SIGNIFICANT CHANGE UP
SARS-COV-2 IGG+IGM SERPL QL IA: POSITIVE
SODIUM SERPL-SCNC: 132 MMOL/L — LOW (ref 135–145)
WBC # BLD: 6.22 K/UL — SIGNIFICANT CHANGE UP (ref 3.8–10.5)
WBC # FLD AUTO: 6.22 K/UL — SIGNIFICANT CHANGE UP (ref 3.8–10.5)

## 2021-11-13 PROCEDURE — 99233 SBSQ HOSP IP/OBS HIGH 50: CPT

## 2021-11-13 RX ORDER — TERAZOSIN HYDROCHLORIDE 10 MG/1
0 CAPSULE ORAL
Qty: 0 | Refills: 0 | DISCHARGE

## 2021-11-13 RX ORDER — INFLUENZA VIRUS VACCINE 15; 15; 15; 15 UG/.5ML; UG/.5ML; UG/.5ML; UG/.5ML
0.7 SUSPENSION INTRAMUSCULAR ONCE
Refills: 0 | Status: DISCONTINUED | OUTPATIENT
Start: 2021-11-13 | End: 2021-11-14

## 2021-11-13 RX ORDER — REMDESIVIR 5 MG/ML
200 INJECTION INTRAVENOUS EVERY 24 HOURS
Refills: 0 | Status: COMPLETED | OUTPATIENT
Start: 2021-11-12 | End: 2021-11-13

## 2021-11-13 RX ORDER — REMDESIVIR 5 MG/ML
100 INJECTION INTRAVENOUS EVERY 24 HOURS
Refills: 0 | Status: DISCONTINUED | OUTPATIENT
Start: 2021-11-14 | End: 2021-11-14

## 2021-11-13 RX ADMIN — Medication 975 MILLIGRAM(S): at 00:00

## 2021-11-13 RX ADMIN — Medication 400 UNIT(S): at 05:56

## 2021-11-13 RX ADMIN — SODIUM CHLORIDE 3 MILLILITER(S): 9 INJECTION INTRAMUSCULAR; INTRAVENOUS; SUBCUTANEOUS at 19:53

## 2021-11-13 RX ADMIN — ZINC SULFATE TAB 220 MG (50 MG ZINC EQUIVALENT) 220 MILLIGRAM(S): 220 (50 ZN) TAB at 11:23

## 2021-11-13 RX ADMIN — SODIUM CHLORIDE 3 MILLILITER(S): 9 INJECTION INTRAMUSCULAR; INTRAVENOUS; SUBCUTANEOUS at 11:27

## 2021-11-13 RX ADMIN — Medication 400 UNIT(S): at 16:42

## 2021-11-13 RX ADMIN — Medication 6 MILLIGRAM(S): at 05:56

## 2021-11-13 RX ADMIN — REMDESIVIR 500 MILLIGRAM(S): 5 INJECTION INTRAVENOUS at 05:55

## 2021-11-13 RX ADMIN — SODIUM CHLORIDE 3 MILLILITER(S): 9 INJECTION INTRAMUSCULAR; INTRAVENOUS; SUBCUTANEOUS at 05:57

## 2021-11-13 NOTE — H&P ADULT - NSICDXPASTMEDICALHX_GEN_ALL_CORE_FT
PAST MEDICAL HISTORY:  Chronic atrial fibrillation     History of BPH     History of COPD     Hyperlipidemia, unspecified hyperlipidemia type     Hypertension

## 2021-11-13 NOTE — H&P ADULT - HISTORY OF PRESENT ILLNESS
70 y/o male with history of Afib on Eliquis, HLD, HTN, BPH, COPD not on home 02 comes in with increasing SANTOS, muscle aches, subjective fever and reports multiple sick contacts at home. Patient tested positive for COVID at home with a home testing kit. He is s/p Pfizer vaccine x 2 last dose was 7/21. Denies CP, /GI complaints. Uses a cane at baseline, and does reports occasional falls, but no c/o HA or focal weakness. In ED noted to become hypoxic with ambulation down to 87%. CXR with interstitial infiltrates, inflammatory markers elevated, and COVID PCR positive. Received Decadron and started on Remdesivir.

## 2021-11-13 NOTE — CHART NOTE - NSCHARTNOTEFT_GEN_A_CORE
Pt examined lying in bed  Comfortable on RA   Hypoxic on ambulation   Hemodynamically stable, not in acute distress Pt examined lying in bed  Comfortable on RA   Hypoxic on ambulation   Hemodynamically stable, not in acute distress  Agrees with plan to monitor for 24 hours on Dexa/Remdesivir  Does not want family updated, states he will update himself

## 2021-11-13 NOTE — H&P ADULT - ASSESSMENT
70 y/o male with acute hypoxic respiratory failure, COVID-19 PNA, Hx of Afib, HTN, HLD, BPH    Acute Hypoxic Respiratory Failure:  -Patient with breakthrough COVID-19 PNA as he is already vaccinated and not due for booster at this time  -Cont. supportive care with supplemental 02, Decadron, Remdesevir, Spirometer, prone as tolerated, Supplements  -On full dose A/C already for Afib  -OOB, ambulate, fall risk, PT eval with reported hx of falls using cane  -Trend labs, markers  -ID eval pending clinical course to above    Afib:  -Cont. A/C, BB  -Denies bleeding  -PT eval with reported falls with cane, likely needs to be using Walker  -denies bleeding    HTN:  -DASH diet  -Cont. o/p regimen    BPH:  -Denies LUTS  -Cont. Terazosin/Flomax    HLD:  -Cont. o/p regimen  -

## 2021-11-13 NOTE — H&P ADULT - MS EXT PE MLT D E PC
"HPI: Zakia Price is a  57 y.o. female who was referred to me by Dr. Roberts and was seen in consultation today for a second opinion for bilateral foot pain. She notes numbness, tingling and burning pain in the toes and feet.   She also c/o of her bunions.   She rates her pain as 6/0 today. She has peripheral neuropathy due to diabetes mellitus.    No history of injury or trauma.     PAST MEDICAL/SURGICAL/FAMILY/SOCIAL/ HISTORY: REVIEWED    ALLERGIES/MEDICATIONS: REVIEWED       Review of Systems:     Constitution: Negative.   HEENT: Negative.   Eyes: Negative.   Cardiovascular: Negative.   Respiratory: Negative.   Endocrine: Negative.   Hematologic/Lymphatic: Negative.   Skin: Negative.   Musculoskeletal: Positive for bilateral foot pain   Gastrointestinal: Negative.   Genitourinary: Negative.   Neurological: Negative.   Psychiatric/Behavioral: Negative.   Allergic/Immunologic: Negative.       PHYSICAL EXAM:  There were no vitals filed for this visit.  Ht Readings from Last 1 Encounters:   05/30/17 5' 8.5" (1.74 m)     Wt Readings from Last 1 Encounters:   05/30/17 (!) 158.8 kg (350 lb)       GENERAL: Well developed, well nourished, no acute distress, morbidly obese.  SKIN: Skin is intact. No atrophy, abrasions or lesions are noted.   Neurological: Normal mental status. Appropriate and conversant. Alert and oriented x 3.  GAIT: Walks with non-antalgic gait.    Left lower extremity:  2+ dorsalis pedis pulse.  Capillary refill < 3 seconds.  Normal range of motion tibiotalar and subtalar joints. Normal alignment of the forefoot and the hindfoot.  5/5 strength EHL, FHL, tibialis anterior, gastrocsoleus, tibialis posterior and peroneals. Sensation to light touch intact sural, saphenous, superficial peroneal and deep peroneal nerves. No swelling, ecchymosis or deformity. No lymphadenopathy, no masses or tumors palpated. Hallux valgus.    Right lower extremity:  2+ dorsalis pedis pulse.  Capillary refill < 3 " seconds.  Normal range of motion tibiotalar and subtalar joints. Normal alignment of the forefoot and the hindfoot.  5/5 strength EHL, FHL, tibialis anterior, gastrocsoleus, tibialis posterior and peroneals. Sensation to light touch intact sural, saphenous, superficial peroneal and deep peroneal nerves. No swelling, ecchymosis or deformity. No lymphadenopathy, no masses or tumors palpated.  Hallux valgus.    XRAYS:   3 views of bilateral feet  reviewed today reveal osteoarthritis of the midfoot and hindfoot. Hallux valgus.       ASSESSMENT:         Encounter Diagnoses   Name Primary?    Diabetic polyneuropathy associated with type 2 diabetes mellitus Yes    Hallux valgus, acquired, bilateral         PLAN:  I spent 15 minutes in consulation with the patient today. More than half the time was spent counseling the patient on her condition and the options for operative versus non-operative care.    I told her that her pain is due to her peripheral neuropathy.  I also would not recommend any type of surgery for her as her BMI is >50. I referred her to Dr. Rivas for evaluation for bariatric surgery. I discussed with her that Dr. Peterson could increase her Neurontin to assist with her neuropathic pain.    no clubbing

## 2021-11-14 ENCOUNTER — TRANSCRIPTION ENCOUNTER (OUTPATIENT)
Age: 71
End: 2021-11-14

## 2021-11-14 VITALS
SYSTOLIC BLOOD PRESSURE: 140 MMHG | HEART RATE: 76 BPM | OXYGEN SATURATION: 96 % | RESPIRATION RATE: 18 BRPM | TEMPERATURE: 99 F | DIASTOLIC BLOOD PRESSURE: 78 MMHG

## 2021-11-14 LAB
ALBUMIN SERPL ELPH-MCNC: 3.8 G/DL — SIGNIFICANT CHANGE UP (ref 3.3–5.2)
ALBUMIN SERPL ELPH-MCNC: 3.8 G/DL — SIGNIFICANT CHANGE UP (ref 3.3–5.2)
ALP SERPL-CCNC: 59 U/L — SIGNIFICANT CHANGE UP (ref 40–120)
ALP SERPL-CCNC: 60 U/L — SIGNIFICANT CHANGE UP (ref 40–120)
ALT FLD-CCNC: 29 U/L — SIGNIFICANT CHANGE UP
ALT FLD-CCNC: 31 U/L — SIGNIFICANT CHANGE UP
ANION GAP SERPL CALC-SCNC: 13 MMOL/L — SIGNIFICANT CHANGE UP (ref 5–17)
AST SERPL-CCNC: 27 U/L — SIGNIFICANT CHANGE UP
AST SERPL-CCNC: 27 U/L — SIGNIFICANT CHANGE UP
BILIRUB DIRECT SERPL-MCNC: 0.2 MG/DL — SIGNIFICANT CHANGE UP (ref 0–0.3)
BILIRUB INDIRECT FLD-MCNC: 0.3 MG/DL — SIGNIFICANT CHANGE UP (ref 0.2–1)
BILIRUB SERPL-MCNC: 0.4 MG/DL — SIGNIFICANT CHANGE UP (ref 0.4–2)
BILIRUB SERPL-MCNC: 0.5 MG/DL — SIGNIFICANT CHANGE UP (ref 0.4–2)
BUN SERPL-MCNC: 18.3 MG/DL — SIGNIFICANT CHANGE UP (ref 8–20)
CALCIUM SERPL-MCNC: 8.8 MG/DL — SIGNIFICANT CHANGE UP (ref 8.6–10.2)
CHLORIDE SERPL-SCNC: 99 MMOL/L — SIGNIFICANT CHANGE UP (ref 98–107)
CO2 SERPL-SCNC: 25 MMOL/L — SIGNIFICANT CHANGE UP (ref 22–29)
CREAT SERPL-MCNC: 0.97 MG/DL — SIGNIFICANT CHANGE UP (ref 0.5–1.3)
GLUCOSE SERPL-MCNC: 143 MG/DL — HIGH (ref 70–99)
HCT VFR BLD CALC: 41.6 % — SIGNIFICANT CHANGE UP (ref 39–50)
HGB BLD-MCNC: 14.2 G/DL — SIGNIFICANT CHANGE UP (ref 13–17)
INR BLD: 1.25 RATIO — HIGH (ref 0.88–1.16)
MCHC RBC-ENTMCNC: 32.3 PG — SIGNIFICANT CHANGE UP (ref 27–34)
MCHC RBC-ENTMCNC: 34.1 GM/DL — SIGNIFICANT CHANGE UP (ref 32–36)
MCV RBC AUTO: 94.5 FL — SIGNIFICANT CHANGE UP (ref 80–100)
PLATELET # BLD AUTO: 170 K/UL — SIGNIFICANT CHANGE UP (ref 150–400)
POTASSIUM SERPL-MCNC: 4.3 MMOL/L — SIGNIFICANT CHANGE UP (ref 3.5–5.3)
POTASSIUM SERPL-SCNC: 4.3 MMOL/L — SIGNIFICANT CHANGE UP (ref 3.5–5.3)
PROT SERPL-MCNC: 6.3 G/DL — LOW (ref 6.6–8.7)
PROT SERPL-MCNC: 6.5 G/DL — LOW (ref 6.6–8.7)
PROTHROM AB SERPL-ACNC: 14.3 SEC — HIGH (ref 10.6–13.6)
RBC # BLD: 4.4 M/UL — SIGNIFICANT CHANGE UP (ref 4.2–5.8)
RBC # FLD: 12.8 % — SIGNIFICANT CHANGE UP (ref 10.3–14.5)
SODIUM SERPL-SCNC: 137 MMOL/L — SIGNIFICANT CHANGE UP (ref 135–145)
WBC # BLD: 9.02 K/UL — SIGNIFICANT CHANGE UP (ref 3.8–10.5)
WBC # FLD AUTO: 9.02 K/UL — SIGNIFICANT CHANGE UP (ref 3.8–10.5)

## 2021-11-14 PROCEDURE — 96374 THER/PROPH/DIAG INJ IV PUSH: CPT

## 2021-11-14 PROCEDURE — 85384 FIBRINOGEN ACTIVITY: CPT

## 2021-11-14 PROCEDURE — 82565 ASSAY OF CREATININE: CPT

## 2021-11-14 PROCEDURE — 36415 COLL VENOUS BLD VENIPUNCTURE: CPT

## 2021-11-14 PROCEDURE — 85610 PROTHROMBIN TIME: CPT

## 2021-11-14 PROCEDURE — 85025 COMPLETE CBC W/AUTO DIFF WBC: CPT

## 2021-11-14 PROCEDURE — 82728 ASSAY OF FERRITIN: CPT

## 2021-11-14 PROCEDURE — 99239 HOSP IP/OBS DSCHRG MGMT >30: CPT

## 2021-11-14 PROCEDURE — 99285 EMERGENCY DEPT VISIT HI MDM: CPT

## 2021-11-14 PROCEDURE — U0003: CPT

## 2021-11-14 PROCEDURE — 84145 PROCALCITONIN (PCT): CPT

## 2021-11-14 PROCEDURE — U0005: CPT

## 2021-11-14 PROCEDURE — 80048 BASIC METABOLIC PNL TOTAL CA: CPT

## 2021-11-14 PROCEDURE — 71045 X-RAY EXAM CHEST 1 VIEW: CPT

## 2021-11-14 PROCEDURE — 86140 C-REACTIVE PROTEIN: CPT

## 2021-11-14 PROCEDURE — 80076 HEPATIC FUNCTION PANEL: CPT

## 2021-11-14 PROCEDURE — 85379 FIBRIN DEGRADATION QUANT: CPT

## 2021-11-14 PROCEDURE — 85027 COMPLETE CBC AUTOMATED: CPT

## 2021-11-14 PROCEDURE — 80053 COMPREHEN METABOLIC PANEL: CPT

## 2021-11-14 PROCEDURE — 86769 SARS-COV-2 COVID-19 ANTIBODY: CPT

## 2021-11-14 PROCEDURE — 93005 ELECTROCARDIOGRAM TRACING: CPT

## 2021-11-14 PROCEDURE — 93010 ELECTROCARDIOGRAM REPORT: CPT

## 2021-11-14 RX ORDER — ESCITALOPRAM OXALATE 10 MG/1
1 TABLET, FILM COATED ORAL
Qty: 0 | Refills: 0 | DISCHARGE

## 2021-11-14 RX ORDER — ATORVASTATIN CALCIUM 80 MG/1
1 TABLET, FILM COATED ORAL
Qty: 0 | Refills: 0 | DISCHARGE

## 2021-11-14 RX ORDER — METOPROLOL TARTRATE 50 MG
1 TABLET ORAL
Qty: 14 | Refills: 0
Start: 2021-11-14 | End: 2021-11-27

## 2021-11-14 RX ORDER — METOPROLOL TARTRATE 50 MG
1 TABLET ORAL
Qty: 0 | Refills: 0 | DISCHARGE

## 2021-11-14 RX ORDER — RAMIPRIL 5 MG
1 CAPSULE ORAL
Qty: 0 | Refills: 0 | DISCHARGE

## 2021-11-14 RX ORDER — CHOLECALCIFEROL (VITAMIN D3) 125 MCG
400 CAPSULE ORAL
Qty: 0 | Refills: 0 | DISCHARGE
Start: 2021-11-14

## 2021-11-14 RX ORDER — RAMIPRIL 5 MG
1 CAPSULE ORAL
Qty: 14 | Refills: 0
Start: 2021-11-14 | End: 2021-11-27

## 2021-11-14 RX ORDER — TAMSULOSIN HYDROCHLORIDE 0.4 MG/1
1 CAPSULE ORAL
Qty: 0 | Refills: 0 | DISCHARGE

## 2021-11-14 RX ADMIN — SODIUM CHLORIDE 3 MILLILITER(S): 9 INJECTION INTRAMUSCULAR; INTRAVENOUS; SUBCUTANEOUS at 05:52

## 2021-11-14 RX ADMIN — REMDESIVIR 500 MILLIGRAM(S): 5 INJECTION INTRAVENOUS at 06:05

## 2021-11-14 RX ADMIN — Medication 6 MILLIGRAM(S): at 06:05

## 2021-11-14 RX ADMIN — Medication 400 UNIT(S): at 06:07

## 2021-11-14 RX ADMIN — SODIUM CHLORIDE 3 MILLILITER(S): 9 INJECTION INTRAMUSCULAR; INTRAVENOUS; SUBCUTANEOUS at 13:50

## 2021-11-14 NOTE — DISCHARGE NOTE PROVIDER - NSDCCPCAREPLAN_GEN_ALL_CORE_FT
PRINCIPAL DISCHARGE DIAGNOSIS  Diagnosis: 2019 novel coronavirus disease (COVID-19)  Assessment and Plan of Treatment:

## 2021-11-14 NOTE — DISCHARGE NOTE PROVIDER - NSDCMRMEDTOKEN_GEN_ALL_CORE_FT
Aspir 81 oral delayed release tablet: 1 tab(s) orally once a day  atorvastatin 10 mg oral tablet: 1 tab(s) orally once a day  cholecalciferol oral tablet: 400 unit(s) orally 2 times a day  CoQ10 300 mg oral capsule: 1 cap(s) orally once a day  Eliquis 5 mg oral tablet: 1 tab(s) orally 2 times a day  escitalopram 20 mg oral tablet: 1 tab(s) orally once a day  fenofibrate 160 mg oral tablet: 1 tab(s) orally once a day  Fish Oil oral capsule:   Flomax 0.4 mg oral capsule: 1 cap(s) orally once a day (at bedtime)  ramipril 2.5 mg oral capsule: 1 cap(s) orally once a day   Symbicort 80 mcg-4.5 mcg/inh inhalation aerosol: 2 puff(s) inhaled 2 times a day  terazosin 5 mg oral capsule: 1 cap(s) orally once a day (at bedtime)  Toprol-XL 25 mg oral tablet, extended release: 1 tab(s) orally once a day

## 2021-11-14 NOTE — DISCHARGE NOTE PROVIDER - NSDCFUADDINST_GEN_ALL_CORE_FT
Walk using a cane or walker at all times when home and ensure that you see your PCP within 1 week to review your home medication regimen   Caution while showering as you are on a blood thinner

## 2021-11-14 NOTE — DISCHARGE NOTE NURSING/CASE MANAGEMENT/SOCIAL WORK - PATIENT PORTAL LINK FT
You can access the FollowMyHealth Patient Portal offered by [facility] by registering at the following website: [email or web address]. By joining [facility]’s FollowMyHealth portal, you will also be able to view your health information using other applications (apps) compatible with our system.

## 2021-11-14 NOTE — DISCHARGE NOTE PROVIDER - HOSPITAL COURSE
Brief Hospital course    72 y/o male with Afib on Eliquis, HLD, HTN, BPH, COPD not on home 02 admitted with SANTOS, fatigue and subjective fevers. in with increasing SANTOS, muscle aches, subjective fever found to be Covid 19 positive. He is vaccinated and has only transiently required oxygen. Was started on Remdesivir and [id number] and at this time he is comfortable room air, able to ambulate to bathroom and back with O2sats > 95%. Care discussed with daughter in detail. Given report of possible orthostasis at home (neg at hospital) anti HTN regimen adjusted (Ramipril decreased to 2.5 mg daily)      Discharge exam :  CONSTITUTIONAL: Nontoxic male lying in bed, comfortable on RA  ENMT: Moist oral mucosa, no pharyngeal injection or exudates; normal dentition  RESPIRATORY: Normal respiratory effort; lungs are clear to auscultation bilaterally  CARDIOVASCULAR: Regular rate and rhythm, normal S1 and S2, no murmur/rub/gallop; No lower extremity edema; Peripheral pulses are 2+ bilaterally  ABDOMEN: Nontender to palpation, normoactive bowel sounds, no rebound/guarding; No hepatosplenomegaly  MUSCLOSKELETAL:  Normal gait; no clubbing or cyanosis of digits; no joint swelling or tenderness to palpation  PSYCH: A+O to person, place, and time; affect appropriate  NEUROLOGY: CN 2-12 are intact and symmetric; no gross sensory deficits;   SKIN: No rashes; no palpable lesions    Pertinent labs and imaging :    11/12/21 CXR  IMPRESSION:  Limited by suboptimal degree of inspiration with crowding of the bronchovascular markings. Underlying streaky opacity/infiltrate not excluded. Follow-up chest PA and lateral with better degree of inspiration as clinically indicated.      Time spent on dsc : 40 minutes

## 2021-11-30 ENCOUNTER — NON-APPOINTMENT (OUTPATIENT)
Age: 71
End: 2021-11-30

## 2021-11-30 ENCOUNTER — APPOINTMENT (OUTPATIENT)
Dept: CARDIOLOGY | Facility: CLINIC | Age: 71
End: 2021-11-30
Payer: MEDICARE

## 2021-11-30 VITALS
HEIGHT: 71 IN | SYSTOLIC BLOOD PRESSURE: 127 MMHG | RESPIRATION RATE: 16 BRPM | DIASTOLIC BLOOD PRESSURE: 74 MMHG | WEIGHT: 227 LBS | BODY MASS INDEX: 31.78 KG/M2 | HEART RATE: 64 BPM | OXYGEN SATURATION: 96 % | TEMPERATURE: 98.3 F

## 2021-11-30 VITALS
RESPIRATION RATE: 16 BRPM | SYSTOLIC BLOOD PRESSURE: 102 MMHG | OXYGEN SATURATION: 95 % | DIASTOLIC BLOOD PRESSURE: 64 MMHG | HEART RATE: 60 BPM

## 2021-11-30 VITALS — SYSTOLIC BLOOD PRESSURE: 94 MMHG | DIASTOLIC BLOOD PRESSURE: 60 MMHG

## 2021-11-30 VITALS — SYSTOLIC BLOOD PRESSURE: 90 MMHG | DIASTOLIC BLOOD PRESSURE: 58 MMHG

## 2021-11-30 DIAGNOSIS — Z87.438 PERSONAL HISTORY OF OTHER DISEASES OF MALE GENITAL ORGANS: ICD-10-CM

## 2021-11-30 DIAGNOSIS — U07.1 COVID-19: ICD-10-CM

## 2021-11-30 DIAGNOSIS — R06.83 SNORING: ICD-10-CM

## 2021-11-30 DIAGNOSIS — Z86.39 PERSONAL HISTORY OF OTHER ENDOCRINE, NUTRITIONAL AND METABOLIC DISEASE: ICD-10-CM

## 2021-11-30 DIAGNOSIS — R40.0 SOMNOLENCE: ICD-10-CM

## 2021-11-30 PROCEDURE — 99215 OFFICE O/P EST HI 40 MIN: CPT

## 2021-11-30 PROCEDURE — 93000 ELECTROCARDIOGRAM COMPLETE: CPT | Mod: 59

## 2021-11-30 PROCEDURE — 93270 REMOTE 30 DAY ECG REV/REPORT: CPT

## 2021-11-30 RX ORDER — FLUTICASONE FUROATE, UMECLIDINIUM BROMIDE AND VILANTEROL TRIFENATATE 100; 62.5; 25 UG/1; UG/1; UG/1
100-62.5-25 POWDER RESPIRATORY (INHALATION)
Refills: 0 | Status: DISCONTINUED | COMMUNITY
Start: 2021-01-04 | End: 2021-11-30

## 2021-11-30 RX ORDER — TERAZOSIN 5 MG/1
5 CAPSULE ORAL
Qty: 90 | Refills: 2 | Status: ACTIVE | COMMUNITY

## 2021-11-30 RX ORDER — VITAMIN D3/FOLIC ACID 95 MCG-1MG
TABLET ORAL
Refills: 0 | Status: ACTIVE | COMMUNITY

## 2021-11-30 NOTE — DISCUSSION/SUMMARY
[Patient] : the patient [Risks] : risks [Benefits] : benefits [Alternatives] : alternatives [With Me] : with me [___ Month(s)] : in [unfilled] month(s) [FreeTextEntry1] : This is a 70 year old male with history of hypertension and dyslipidemia , alcohol abuse, prior smoking, here with day time sleepiness, and chest pain , and dyspnea on exertion , \par \par 1)   dizziness bradycardia. : 2 weeks event monitor,.  MRI brain. however. p[atietn defers. orthostatic bloo dpressures.  compression stockins. will cut viridiana ramilpril if symptoms persist.  on meds for BPH. \par 2 Atrial fibrillation : paroxysmal.  in sinus now. CHADSVASC : 2 . on eliquis 5 mg Q12.  ct toprol 50 mg daily. 2 weeks monitor. \par 3) chest pain and dyspnea on exertion : no ischemia. \par 4) hypertension : ct ramipril. as above will cut down if symptoms of dizziness persist. \par 5) dyslipidemia : ct fenofirbate, and ct statins. \par 6) Smoking:  AAA screening.  > 50 packe year history. : no aneurysm. \par 7) carotid plaque plaque: aspirin . statins. \par

## 2021-11-30 NOTE — CARDIOLOGY SUMMARY
[de-identified] : 11 30 2021  Sinus  Bradycardia \par WITHIN NORMAL LIMITS\par \par \par 5/25/2021   [de-identified] : 2/29/2021:  Nuclear stress test: No ischemia. raz nuclear.  LVEf 72%.  [de-identified] : feb 2021:  mild LVH.  grade I    diastolic dysfunction  normal Rv.  no significant valvular abnormality  [de-identified] : cartoid Duplex:  2017: smal calcified  left ICA  [___] : [unfilled]

## 2021-11-30 NOTE — HISTORY OF PRESENT ILLNESS
[FreeTextEntry1] : Abnormal EKG, atrial fibrillation \par \par \par HPI for today: : lightheadedness 2 times a day.  wuite often. not every time he gets up. sometimes when he is not getting up and he feels lightheadedness.\par bno weakness of arms. \par balance if off.  he feels ataxic.\par \par \par old note:  compliant with meds.  no bleeding episodes. no headaches.\par occasional  dizziness. no syncope. \par dizziness especially when he gets up to o fast.  no syncope. \par \par \par old note: This is a 70 year old male with history of hypertension and dyslipidemia , prior smoking, quit 20 year s agom, alcohol abuse, here with chest pain and dyspnea on exertion and new onset afib. \par \par chest pain.  Onset: months.   Type: Pressure ; Duration: intermittent days. intensity: 1/10, lasted for : couple of hours.;   Radiation:  none  Associated symptoms: + Dyspnea. \par No chest paion on walking. on going up stairs. Dyspnea not gettging worse. PMD taking care of that.\par alcohol use : 6 canes every day for last few years.\par \par + palpitations. irrengular heart beat.  + dizziness.\par + snoring.  he wakes up 2-3 time a night. going to bathroom.   he takes naps during day time. day time sleepiness. \par \par

## 2021-12-11 ENCOUNTER — NON-APPOINTMENT (OUTPATIENT)
Age: 71
End: 2021-12-11

## 2021-12-14 ENCOUNTER — NON-APPOINTMENT (OUTPATIENT)
Age: 71
End: 2021-12-14

## 2021-12-14 PROBLEM — Z87.09 PERSONAL HISTORY OF OTHER DISEASES OF THE RESPIRATORY SYSTEM: Chronic | Status: ACTIVE | Noted: 2021-11-13

## 2021-12-14 PROBLEM — I48.20 CHRONIC ATRIAL FIBRILLATION, UNSPECIFIED: Chronic | Status: ACTIVE | Noted: 2021-11-13

## 2021-12-14 PROBLEM — Z87.438 PERSONAL HISTORY OF OTHER DISEASES OF MALE GENITAL ORGANS: Chronic | Status: ACTIVE | Noted: 2021-11-13

## 2021-12-28 ENCOUNTER — NON-APPOINTMENT (OUTPATIENT)
Age: 71
End: 2021-12-28

## 2021-12-28 ENCOUNTER — APPOINTMENT (OUTPATIENT)
Dept: CARDIOLOGY | Facility: CLINIC | Age: 71
End: 2021-12-28
Payer: MEDICARE

## 2021-12-28 VITALS
DIASTOLIC BLOOD PRESSURE: 68 MMHG | HEART RATE: 62 BPM | RESPIRATION RATE: 14 BRPM | WEIGHT: 229 LBS | BODY MASS INDEX: 32.06 KG/M2 | OXYGEN SATURATION: 95 % | SYSTOLIC BLOOD PRESSURE: 122 MMHG | HEIGHT: 71 IN | TEMPERATURE: 98.6 F

## 2021-12-28 VITALS — DIASTOLIC BLOOD PRESSURE: 56 MMHG | SYSTOLIC BLOOD PRESSURE: 110 MMHG

## 2021-12-28 VITALS — DIASTOLIC BLOOD PRESSURE: 62 MMHG | SYSTOLIC BLOOD PRESSURE: 122 MMHG

## 2021-12-28 PROCEDURE — 93000 ELECTROCARDIOGRAM COMPLETE: CPT

## 2021-12-28 PROCEDURE — 99214 OFFICE O/P EST MOD 30 MIN: CPT

## 2021-12-28 RX ORDER — RAMIPRIL 2.5 MG/1
2.5 CAPSULE ORAL
Refills: 0 | Status: DISCONTINUED | COMMUNITY
End: 2021-12-28

## 2021-12-29 ENCOUNTER — NON-APPOINTMENT (OUTPATIENT)
Age: 71
End: 2021-12-29

## 2021-12-30 ENCOUNTER — APPOINTMENT (OUTPATIENT)
Dept: NEUROLOGY | Facility: CLINIC | Age: 71
End: 2021-12-30
Payer: MEDICARE

## 2021-12-30 VITALS
WEIGHT: 230 LBS | HEIGHT: 71 IN | BODY MASS INDEX: 32.2 KG/M2 | SYSTOLIC BLOOD PRESSURE: 124 MMHG | DIASTOLIC BLOOD PRESSURE: 68 MMHG

## 2021-12-30 DIAGNOSIS — G31.84 MILD COGNITIVE IMPAIRMENT, SO STATED: ICD-10-CM

## 2021-12-30 PROCEDURE — 99215 OFFICE O/P EST HI 40 MIN: CPT

## 2021-12-30 NOTE — PHYSICAL EXAM
[General Appearance - Alert] : alert [General Appearance - In No Acute Distress] : in no acute distress [Oriented To Time, Place, And Person] : oriented to person, place, and time [Memory Remote] : remote memory was not impaired [Affect] : the affect was normal [Total Score ___ / 30] : the patient achieved a score of [unfilled] /30 [Date / Time ___ / 5] : date / time [unfilled] / 5 [Place ___ / 5] : place [unfilled] / 5 [Registration ___ / 3] : registration [unfilled] / 3 [Serial Sevens ___/5] : serial sevens [unfilled] / 5 [Naming 2 Objects ___ / 2] : naming two objects [unfilled] / 2 [Repeating a Sentence ___ / 1] : repeating a sentence [unfilled] / 1 [Writing a Sentence ___ / 1] : write sentence [unfilled] / 1 [3-stage Verbal Command ___ / 3] : three-stage verbal command [unfilled] / 3 [Written Command ___ / 1] : written command [unfilled] / 1 [Copy a Design ___ / 1] : copy a design [unfilled] / 1 [Recall ___ / 3] : recall [unfilled] / 3 [Cranial Nerves Optic (II)] : visual acuity intact bilaterally,  visual fields full to confrontation, pupils equal round and reactive to light [Cranial Nerves Oculomotor (III)] : extraocular motion intact [Cranial Nerves Trigeminal (V)] : facial sensation intact symmetrically [Cranial Nerves Facial (VII)] : face symmetrical [Cranial Nerves Vestibulocochlear (VIII)] : hearing was intact bilaterally [Cranial Nerves Glossopharyngeal (IX)] : tongue and palate midline [Cranial Nerves Accessory (XI - Cranial And Spinal)] : head turning and shoulder shrug symmetric [Cranial Nerves Hypoglossal (XII)] : there was no tongue deviation with protrusion [Motor Tone] : muscle tone was normal in all four extremities [Motor Strength] : muscle strength was normal in all four extremities [Sensation Tactile Decrease] : light touch was intact [Sensation Pain / Temperature Decrease] : pain and temperature was intact [Sensation Vibration Decrease] : vibration was intact [Abnormal Walk] : normal gait [1+] : Ankle jerk left 1+ [Dysarthria] : no dysarthria [Aphasia] : no dysphasia/aphasia [Romberg's Sign] : Romberg's sign was negtive [Coordination - Dysmetria Impaired Finger-to-Nose Bilateral] : not present [Plantar Reflex Right Only] : normal on the right [Plantar Reflex Left Only] : normal on the left

## 2021-12-30 NOTE — DATA REVIEWED
[de-identified] : Brain MRI was performed on 1/23/18. The study demonstrated ischemic white matter disease as well as a tiny lacunar infarct in the left thalamus. [de-identified] : Thyroid function was normal in January of 2018. B12 level was 474.

## 2021-12-30 NOTE — CONSULT LETTER
[Dear  ___] : Dear  [unfilled], [Courtesy Letter:] : I had the pleasure of seeing your patient, [unfilled], in my office today. [Please see my note below.] : Please see my note below. [Consult Closing:] : Thank you very much for allowing me to participate in the care of this patient.  If you have any questions, please do not hesitate to contact me. [Sincerely,] : Sincerely, [FreeTextEntry3] : Ramu Fuentes MD.

## 2021-12-30 NOTE — HISTORY OF PRESENT ILLNESS
[FreeTextEntry1] : I saw this patient in the office today.\par \par He had been seen previously in February of 2018 for some mild memory difficulty.\par Workup at that time had shown a chronic infarct and brain MRI but was otherwise unremarkable.\par I had discussed with the patient and his daughter and we had started Aricept, however, he did not continue it.\par He now reports that his symptoms have not progressed.\par He is currently on antidepressant medication.\par \par He is now referred for a new problem.\par He has been having dizziness and balance difficulty for the past several months.\par He feels it is somewhat better today, however, he does note that it comes and goes.\par He has been using a cane for the past 4 months due to fear of falls.\par

## 2021-12-30 NOTE — ASSESSMENT
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.\par He has not progressed significantly since 2018.\par I do not see a need to restart Aricept or Namenda.\par \par He now has symptoms of dizziness and balance difficulty.\par I will repeat an MRI of the brain to look for any new infarct.\par I will obtain an EEG to look for any epileptiform abnormalities.\par \par I have given him an instruction sheet for some balance exercises he can do at home.\par \par Prior MRI had demonstrated a chronic infarct.\par In the setting of atrial fibrillation, I would recommend continuing his current regimen of baby aspirin, Eliquis, and atorvastatin.\par \par I will see him back after the above testing.

## 2022-01-03 ENCOUNTER — APPOINTMENT (OUTPATIENT)
Dept: NEUROLOGY | Facility: CLINIC | Age: 72
End: 2022-01-03
Payer: MEDICARE

## 2022-01-03 PROCEDURE — 93040 RHYTHM ECG WITH REPORT: CPT

## 2022-01-03 PROCEDURE — 95819 EEG AWAKE AND ASLEEP: CPT

## 2022-01-04 ENCOUNTER — APPOINTMENT (OUTPATIENT)
Dept: CARDIOLOGY | Facility: CLINIC | Age: 72
End: 2022-01-04
Payer: MEDICARE

## 2022-01-04 VITALS
WEIGHT: 223 LBS | TEMPERATURE: 97.9 F | HEART RATE: 74 BPM | DIASTOLIC BLOOD PRESSURE: 80 MMHG | HEIGHT: 71 IN | OXYGEN SATURATION: 95 % | SYSTOLIC BLOOD PRESSURE: 148 MMHG | BODY MASS INDEX: 31.22 KG/M2

## 2022-01-04 DIAGNOSIS — I95.1 ORTHOSTATIC HYPOTENSION: ICD-10-CM

## 2022-01-04 PROCEDURE — 93000 ELECTROCARDIOGRAM COMPLETE: CPT

## 2022-01-04 PROCEDURE — 99215 OFFICE O/P EST HI 40 MIN: CPT

## 2022-01-04 NOTE — DISCUSSION/SUMMARY
[Patient] : the patient [Risks] : risks [Benefits] : benefits [Alternatives] : alternatives [With Me] : with me [___ Month(s)] : in [unfilled] month(s) [FreeTextEntry1] : This is a 70 year old male with history of hypertension and dyslipidemia , alcohol abuse, prior smoking, here with day time sleepiness, and chest pain , and dyspnea on exertion , \par \par 1)   dizziness bradycardia. : resolved.  off ramip[ril. BP improved. \par 2 Atrial fibrillation : paroxysmal.  in sinus now. CHADSVASC : 2 . on eliquis 5 mg Q12.  ct toprol 50 mg daily \par patient has frequents falls.  and not a candidate for long term anticoagulation . \par discussed with your daugther too.  Watchman evaluation with Dr. Franklin. \par 3) chest pain and dyspnea on exertion : resolved. : no ischemia. \par 4) hypertension : off ramipril. if increases then will have to restart the ramipril.  24 hr BP monitor to evalaute for nocturanl hypertension and to see if need to restart lower dose of rampiril, \par 5) dyslipidemia : ct fenofirbate, and ct statins. \par 6) Smoking:  AAA screening.  > 50 packe year history. : no aneurysm. \par 7) carotid plaque plaque: aspirin . statins. \par

## 2022-01-04 NOTE — HISTORY OF PRESENT ILLNESS
[FreeTextEntry1] : Abnormal EKG, atrial fibrillation \par \par \par HPI for today:   no dizziness. no syncope. complaitn with meds.  no new worsening dyspnea on exertion .  stable chronic dyspnea on exertion .\par he was asked to stopped ramipril. \par \par old note: : lightheadedness 2 times a day.  wuite often. not every time he gets up. sometimes when he is not getting up and he feels lightheadedness.\par bno weakness of arms. \par balance if off.  he feels ataxic.\par \par \par old note:  compliant with meds.  no bleeding episodes. no headaches.\par occasional  dizziness. no syncope. \par dizziness especially when he gets up to o fast.  no syncope. \par \par \par old note: This is a 70 year old male with history of hypertension and dyslipidemia , prior smoking, quit 20 year s agom, alcohol abuse, here with chest pain and dyspnea on exertion and new onset afib. \par \par chest pain.  Onset: months.   Type: Pressure ; Duration: intermittent days. intensity: 1/10, lasted for : couple of hours.;   Radiation:  none  Associated symptoms: + Dyspnea. \par No chest paion on walking. on going up stairs. Dyspnea not gettging worse. PMD taking care of that.\par alcohol use : 6 canes every day for last few years.\par \par + palpitations. irrengular heart beat.  + dizziness.\par + snoring.  he wakes up 2-3 time a night. going to bathroom.   he takes naps during day time. day time sleepiness. \par \par

## 2022-01-04 NOTE — CARDIOLOGY SUMMARY
[de-identified] : 1 4 2022 \par \par 11 30 2021  Sinus  Bradycardia \par WITHIN NORMAL LIMITS\par \par \par 5/25/2021   [de-identified] : 17 dec 2021:  2 marsha kothari event monito.r No significant events on monitor. 2 [de-identified] : 2/29/2021:  Nuclear stress test: No ischemia. raz nuclear.  LVEf 72%.  [de-identified] : feb 2021:  mild LVH.  grade I    diastolic dysfunction  normal Rv.  no significant valvular abnormality  [de-identified] : cartoid Duplex:  2017: smal calcified  left ICA  [___] : [unfilled]

## 2022-01-06 ENCOUNTER — APPOINTMENT (OUTPATIENT)
Dept: MRI IMAGING | Facility: CLINIC | Age: 72
End: 2022-01-06

## 2022-01-10 ENCOUNTER — NON-APPOINTMENT (OUTPATIENT)
Age: 72
End: 2022-01-10

## 2022-01-14 ENCOUNTER — APPOINTMENT (OUTPATIENT)
Dept: PULMONOLOGY | Facility: CLINIC | Age: 72
End: 2022-01-14
Payer: MEDICARE

## 2022-01-14 VITALS
RESPIRATION RATE: 16 BRPM | DIASTOLIC BLOOD PRESSURE: 76 MMHG | OXYGEN SATURATION: 96 % | HEART RATE: 71 BPM | SYSTOLIC BLOOD PRESSURE: 124 MMHG

## 2022-01-14 DIAGNOSIS — J44.9 CHRONIC OBSTRUCTIVE PULMONARY DISEASE, UNSPECIFIED: ICD-10-CM

## 2022-01-14 DIAGNOSIS — Z87.891 PERSONAL HISTORY OF NICOTINE DEPENDENCE: ICD-10-CM

## 2022-01-14 PROCEDURE — 99213 OFFICE O/P EST LOW 20 MIN: CPT

## 2022-01-23 ENCOUNTER — APPOINTMENT (OUTPATIENT)
Dept: MRI IMAGING | Facility: CLINIC | Age: 72
End: 2022-01-23
Payer: MEDICARE

## 2022-01-23 ENCOUNTER — OUTPATIENT (OUTPATIENT)
Dept: OUTPATIENT SERVICES | Facility: HOSPITAL | Age: 72
LOS: 1 days | End: 2022-01-23
Payer: MEDICARE

## 2022-01-23 DIAGNOSIS — R42 DIZZINESS AND GIDDINESS: ICD-10-CM

## 2022-01-23 DIAGNOSIS — I63.9 CEREBRAL INFARCTION, UNSPECIFIED: ICD-10-CM

## 2022-01-23 PROCEDURE — 70551 MRI BRAIN STEM W/O DYE: CPT | Mod: ME

## 2022-01-23 PROCEDURE — 70551 MRI BRAIN STEM W/O DYE: CPT | Mod: 26,ME

## 2022-01-23 PROCEDURE — G1004: CPT

## 2022-01-24 ENCOUNTER — APPOINTMENT (OUTPATIENT)
Dept: ELECTROPHYSIOLOGY | Facility: CLINIC | Age: 72
End: 2022-01-24
Payer: MEDICARE

## 2022-01-24 ENCOUNTER — NON-APPOINTMENT (OUTPATIENT)
Age: 72
End: 2022-01-24

## 2022-01-24 VITALS
OXYGEN SATURATION: 96 % | BODY MASS INDEX: 31.92 KG/M2 | HEART RATE: 65 BPM | RESPIRATION RATE: 17 BRPM | SYSTOLIC BLOOD PRESSURE: 125 MMHG | HEIGHT: 71 IN | WEIGHT: 228 LBS | TEMPERATURE: 98.1 F | DIASTOLIC BLOOD PRESSURE: 74 MMHG

## 2022-01-24 PROCEDURE — 93000 ELECTROCARDIOGRAM COMPLETE: CPT

## 2022-01-24 PROCEDURE — 99205 OFFICE O/P NEW HI 60 MIN: CPT

## 2022-01-24 NOTE — HISTORY OF PRESENT ILLNESS
[FreeTextEntry1] : This is a 71 years old man with PMH of AFIB, CHADS-VASc = 5 (HTN, age, carotid plaque, and stroke on MRI from 2018), HLD, back pain, lumbar spondylosis, COPD not on home oxygen, BRANDIN failed CPAP, BPH, cognitive impairment, frequent falls, obesity, sinus bradycardia and dizziness. He is vaccinated against COVID-19 infection Pfizer. He also has COVID infection in 11/2021. \par \par He saw Dr. Paulson on 1/2021 for CP, SOB, palpitation, and dizziness. He had cardiac workup including EKG, ECHO, NST, carotid Doppler, and cardiac monitor as well as blood work. Workup revealed paroxysmal AFIB, sinus bradycardia, and carotid plaque. He had a normal EF and no significant valve disease. His NST was normal and his cardiac monitor revealed paroxysmal AF. \par \par Since then, he has been treated by Eliquis, and metoprolol along with statin, and aspirin. He recently was noted to have recurrent falls and so now referred to see me in consultation for consideration of alternative stroke prevention strategy, specifically with consideration for PRETTY-occlusion with Watchman procedure. His CHADS-CVASc is 5 as above, and though his stroke risk is high. His bleeding risk is also high, mostly due to falls and dementia/cognitive decline. \par \par Patient reports several recurrent falls, last was 2-3 weeks ago. Never had syncope. He complains of stable (>3 months) SOB and occasional chest tightness. Those resolve with rescue inhalers. \par He denies any fevers, chills, cough, sweats, chest pain, palpitations, orthopnea, paroxysmal nocturnal dyspnea, peripheral edema, lightheadedness, dizziness, syncope, nausea, vomiting, melena, or hematemesis.\par \par He reports compliance with all medications. Also occasional hemorrhoid bleeding. \par He saw Dr. Bhatti from pulmonary and getting a PFT and chest CT to re-evaluate his COPD and referred for dental evaluation for oral sleep appliance. \par \par \par Social Hx:\par He lives with his daughter who helps in most of his daily routine activities. Ex-smoker. History of alcohol abuse\par \par Family history:\par Negative for premature CAD or SCD. \par \par \par Tests:\par CBC and creatinine were normal during admission on 11/2021\par \par EKG 1/24/22: SR, PRWP\par EKG  1/2021 AFIB\par EKG 12/28/2021: SB at 58 bpm, otherwise normal. \par \par Two weeks Cardiac monitor 12/2021: Sinus with paroxysmal AF with RVR. Dizziness c/w sinus bradycardia at 58 bpm\par \par TTE 2/2021: LVH, normal EF, no significant valve disease\par \par NST 2021: Normal EF and normal perfusion\par \par Brain MRI 2018: Microvascular ischemic white matter disease. Tiny lacunar infarct in the left thalamus. \par

## 2022-01-24 NOTE — REVIEW OF SYSTEMS
[Fever] : no fever [Chills] : no chills [SOB] : no shortness of breath [Dyspnea on exertion] : not dyspnea during exertion [Leg Claudication] : no intermittent leg claudication [Palpitations] : no palpitations [Wheezing] : no wheezing [Abdominal Pain] : no abdominal pain [Nausea] : no nausea [Vomiting] : no vomiting [Diarrhea] : diarrhea [Joint Swelling] : no joint swelling [Dizziness] : no dizziness [Easy Bleeding] : no tendency for easy bleeding

## 2022-01-24 NOTE — DISCUSSION/SUMMARY
[FreeTextEntry1] : 1. Blood work, then cardiac CT to assess PRETTY anatomy \par 2. Watchman after the CT. Hold eliquis 36 hours prior to discharge. Will need CINTIA on the table. \par 3. Continue cardiac care with Dr. Paulson and pulm care with Dr. Bhatti

## 2022-01-24 NOTE — ASSESSMENT
[FreeTextEntry1] : This is a 71 years old man with PMH of AFIB, CHADS-VASc = 5 (HTN, age, carotid plaque, and stroke on MRI from 2018), HLD, back pain, lumbar spondylosis, COPD not on home oxygen, BRANDIN failed CPAP, BPH, cognitive impairment, frequent falls, obesity, sinus bradycardia and dizziness. He is vaccinated against COVID-19 infection Pfizer. He also has COVID infection in 11/2021. \par \par He saw Dr. Paulson on 1/2021 for CP, SOB, palpitation, and dizziness. He had cardiac workup including EKG, ECHO, NST, carotid Doppler, and cardiac monitor as well as blood work. Workup revealed paroxysmal AFIB, sinus bradycardia, and carotid plaque. He had a normal EF and no significant valve disease. His NST was normal and his cardiac monitor revealed paroxysmal AF. \par \par Since then, he has been treated by Eliquis, and metoprolol along with statin, and aspirin. He recently was noted to have recurrent falls and so now referred to see me in consultation for consideration of alternative stroke prevention strategy, specifically with consideration for PRETTY-occlusion with Watchman procedure. His CHADS-CVASc is 5 as above, and though his stroke risk is high. His bleeding risk is also high, mostly due to falls and dementia/cognitive decline. \par \par Patient reports several recurrent falls, last was 2-3 weeks ago. Never had syncope. He complains of stable (>3 months) SOB and occasional chest tightness. Those resolve with rescue inhalers. \par \par He reports compliance with all medications. Also occasional hemorrhoid bleeding. \par He saw Dr. Bhatti from pulmonary and getting a PFT and chest CT to re-evaluate his COPD and referred for dental evaluation for oral sleep appliance. \par \par Called daughter, Isaura ([phone]) as patient requested. He lives with her. She reports that patient has frequent falls, she can think of at least 15-20 times. She strongly believe that he falls much more often that what she saw or admits to. She thinks he also either forgets or does not tell us about all his falls. She also confirmed that he is still drinking very heavily which have contributed to some of his falls. She denies any known or witnessed syncope. She witnessed few of those falls and they were all due to imbalance rather than syncope or presyncope.  \par \par I discussed (with patient and then with daughter over the phone) he role of left atrial appendage occlusion at length. I informed the patient that the Watchman device is not a replacement for anticoagulation, as anticoagulation is still the best method of preventing stroke. However, in individuals who are intolerant of anticoagulation, the Watchman is proven to be non-inferior to anticoagulation with warfarin (PREVAIL trial).\par The risks with Watchman implantation were described in detail and include, but are not limited to: pain, bleeding, infection, vascular injury, cardiac perforation and tamponade with potential for requiring open heart surgery, radiation-induced injury, death, heart attack, or stroke, of which the overall rates of occurrence up to 3-4 %. The patient expressed understanding and was provided the opportunity to ask questions, of which all were answered to the patient's satisfaction.\par \par If the patient prefers to pursue Watchman implantation, they would require a CT before hand to see if they are a suitable candidate. Anticoagulation will need to be continued before implantation and up to 6 weeks after implant, at which time a CINTIA will be done. If CINTIA is acceptable, then anticoagulation will be changed to ASA + Plavix for 6 months, after which ASA will be continued alone. A CINTIA is repeated at 1 year to confirm adequate seal of the Watchman device and to rule out thrombus.\par \par At this juncture patient and daughter want to proceed with Watchman. \par \par \par \par

## 2022-02-02 ENCOUNTER — APPOINTMENT (OUTPATIENT)
Dept: CT IMAGING | Facility: CLINIC | Age: 72
End: 2022-02-02
Payer: MEDICARE

## 2022-02-02 ENCOUNTER — OUTPATIENT (OUTPATIENT)
Dept: OUTPATIENT SERVICES | Facility: HOSPITAL | Age: 72
LOS: 1 days | End: 2022-02-02
Payer: MEDICARE

## 2022-02-02 DIAGNOSIS — I48.0 PAROXYSMAL ATRIAL FIBRILLATION: ICD-10-CM

## 2022-02-02 PROCEDURE — 75572 CT HRT W/3D IMAGE: CPT | Mod: 26,MF

## 2022-02-02 PROCEDURE — G1004: CPT

## 2022-02-02 PROCEDURE — 75572 CT HRT W/3D IMAGE: CPT | Mod: MF

## 2022-02-08 NOTE — DISCUSSION/SUMMARY
[FreeTextEntry1] : At least moderate BRANDIN (HST shows significantly High AHI second 1/2 of night when he was actually sleeping.  Significant desaturation also noted during the second 1/2 of the study night\par Obesity \par CPAP failure\par COPD
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
## 2022-02-11 ENCOUNTER — NON-APPOINTMENT (OUTPATIENT)
Age: 72
End: 2022-02-11

## 2022-02-16 RX ORDER — CHLORHEXIDINE GLUCONATE 213 G/1000ML
1 SOLUTION TOPICAL ONCE
Refills: 0 | Status: DISCONTINUED | OUTPATIENT
Start: 2022-02-17 | End: 2022-03-03

## 2022-02-16 NOTE — H&P PST ADULT - NSICDXPASTMEDICALHX_GEN_ALL_CORE_FT
PAST MEDICAL HISTORY:  Chronic atrial fibrillation     History of BPH     History of COPD     Hyperlipidemia, unspecified hyperlipidemia type     Hypertension      PAST MEDICAL HISTORY:  Chronic atrial fibrillation     History of BPH     History of COPD     Hyperlipidemia, unspecified hyperlipidemia type     Hypertension     BRANDIN (obstructive sleep apnea)

## 2022-02-16 NOTE — H&P PST ADULT - PRO ARRIVE FROM
KAMRYN CALLED THE PT AND NOTIFIED THE PT THERE WAS AN ERROR IN THE MEDICATION THAT WAS SENT. DR ALEXANDRE UPPED THE MEDICATION TO 7.5MG A DAY. THEY NEED THIS TO BE FIXED.  
PT CALLED IN REGARDS TO THE VM SHE GOT. SHE WOULD LIKE A CALL BACK AT # [phone] (M)  
lisinopril (ZESTRIL) 2.5 MG tablet 90 tablet 1 8/15/2019     Sig - Route: Take 2 tablets by mouth daily. - Oral       THE PT CALLED IN REGARDS TO THE RX MEDICATION. DR ALEXANDRE UPPED THE MEDICATION TO 7.5MG A DAY DUE TO AN ANXIETY ATTACK. THE PT NEEDS A NEW REFILL TO BE SENT TO THE PHARMACY [facility] IN [place], [place] # [phone]  
lov 8/15/19 left message for neena regarding taking lisinopril for anxiety attacks.  
home

## 2022-02-16 NOTE — H&P PST ADULT - HISTORY OF PRESENT ILLNESS
70 y/o M with PMH AFib CHADS-Vasc 5(HTN, age, carotid plaque, CVA), HLD COPD, AFib on Eliquis with frequent falls. Referred for possible PRETTY occlusion/Watchman and CT scan revealed coronary calcifications. He presents for [facility] for further evaluation prior to Watchman.     Symptoms:        Angina (Class):        Ischemic Symptoms:     Heart Failure:        Systolic/Diastolic/Combined: NA       NYHA Class (within 2 weeks):     Assessment of LVEF (Must be within 6 months):       EF: 50%       Assessed by: TTE       Date: 2/9/21    Prior Cardiac Interventions ([facility], stents, CABG):       PCI's (Date, Stents, Vessels): NA       CABG (Date, Grafts):     Noninvasive Testing:   Stress Test: Date: 2/9/21       Protocol: Regadenoson       Myocardial Imaging: Normal perfusion       Risk Assessment (Low, Medium, High):     Echo (Date, Findings): 2/9/21 mild LVH, normal wall motion, no sig valvular disease    Antianginal Therapies:        Beta Blockers:  Toprol       Calcium Channel Blockers:        Long Acting Nitrates:        Ranexa:     Associated Risk Factors:        Cerebrovascular Disease: CVA       Chronic Lung Disease: COPD/BRANDIN       Peripheral Arterial Disease: N/A       Chronic Kidney Disease (if yes, what is GFR): N/A       Uncontrolled Diabetes (if yes, what is HgbA1C or FBS): N/A       Poorly Controlled Hypertension (if yes, what is SBP): N/A       Morbid Obesity (if yes, what is BMI): N/A       History of Recent Ventricular Arrhythmia: N/A       Inability to Ambulate Safely: Freq falls       Need for Therapeutic Anticoagulation: Eliquis       Antiplatelet or Contrast Allergy: N/A 72 y/o M with PMH AFib CHADS-Vasc 5(HTN, age, carotid plaque, CVA), HLD COPD, AFib on Eliquis with frequent falls. Referred for possible PRETTY occlusion/Watchman and CT scan revealed coronary calcifications. He presents for [facility] for further evaluation prior to Watchman.     Symptoms:        Angina (Class): N/A        Ischemic Symptoms:     Heart Failure:        Systolic/Diastolic/Combined: NA       NYHA Class (within 2 weeks):     Assessment of LVEF (Must be within 6 months):       EF: 50%       Assessed by: TTE       Date: 2/9/21    Prior Cardiac Interventions ([facility], stents, CABG):       PCI's (Date, Stents, Vessels): NA       CABG (Date, Grafts):     Noninvasive Testing:   Stress Test: Date: 2/9/21       Protocol: Regadenoson       Myocardial Imaging: Normal perfusion       Risk Assessment (Low, Medium, High):     Echo (Date, Findings): 2/9/21 mild LVH, normal wall motion, no sig valvular disease    Antianginal Therapies:        Beta Blockers:  Toprol       Calcium Channel Blockers:        Long Acting Nitrates:        Ranexa:     Associated Risk Factors:        Cerebrovascular Disease: CVA       Chronic Lung Disease: COPD/BRANDIN       Peripheral Arterial Disease: N/A       Chronic Kidney Disease (if yes, what is GFR): N/A       Uncontrolled Diabetes (if yes, what is HgbA1C or FBS): N/A       Poorly Controlled Hypertension (if yes, what is SBP): N/A       Morbid Obesity (if yes, what is BMI): N/A       History of Recent Ventricular Arrhythmia: N/A       Inability to Ambulate Safely: Freq falls       Need for Therapeutic Anticoagulation: Eliquis       Antiplatelet or Contrast Allergy: N/A

## 2022-02-16 NOTE — H&P PST ADULT - ASSESSMENT
70 y/o M with coronary calcifications noted on CT presents for [facility] prior to Watchman procedure    NPO confirmed  Last Eliquis dose ******  Consent obtained by IC    CONG Sterlingampati  GFR  BRA 72 y/o M with coronary calcifications noted on CT presents for [facility] prior to Watchman procedure    NPO confirmed  Last Eliquis dose 2/15  Consent obtained by IC    ASA 3  Mallampati 2  GFR  BRA    aspirin given 72 y/o M with coronary calcifications noted on CT presents for [facility] prior to Watchman procedure    NPO confirmed  Last Eliquis dose 2/15  Consent obtained by IC    ASA 3  Mallampati 2  GFR 74  BRA 1.2%    aspirin given

## 2022-02-17 ENCOUNTER — TRANSCRIPTION ENCOUNTER (OUTPATIENT)
Age: 72
End: 2022-02-17

## 2022-02-17 ENCOUNTER — OUTPATIENT (OUTPATIENT)
Dept: OUTPATIENT SERVICES | Facility: HOSPITAL | Age: 72
LOS: 1 days | Discharge: HOME | End: 2022-02-17
Payer: MEDICARE

## 2022-02-17 VITALS
RESPIRATION RATE: 19 BRPM | SYSTOLIC BLOOD PRESSURE: 137 MMHG | DIASTOLIC BLOOD PRESSURE: 68 MMHG | HEART RATE: 67 BPM | OXYGEN SATURATION: 95 %

## 2022-02-17 VITALS
HEART RATE: 74 BPM | SYSTOLIC BLOOD PRESSURE: 144 MMHG | HEIGHT: 71 IN | RESPIRATION RATE: 17 BRPM | OXYGEN SATURATION: 95 % | DIASTOLIC BLOOD PRESSURE: 83 MMHG | TEMPERATURE: 98 F | WEIGHT: 230.38 LBS

## 2022-02-17 DIAGNOSIS — R94.31 ABNORMAL ELECTROCARDIOGRAM [ECG] [EKG]: ICD-10-CM

## 2022-02-17 LAB
ALBUMIN SERPL ELPH-MCNC: 4.3 G/DL — SIGNIFICANT CHANGE UP (ref 3.3–5.2)
ALP SERPL-CCNC: 77 U/L — SIGNIFICANT CHANGE UP (ref 40–120)
ALT FLD-CCNC: 24 U/L — SIGNIFICANT CHANGE UP
ANION GAP SERPL CALC-SCNC: 14 MMOL/L — SIGNIFICANT CHANGE UP (ref 5–17)
AST SERPL-CCNC: 31 U/L — SIGNIFICANT CHANGE UP
BASOPHILS # BLD AUTO: 0 K/UL — SIGNIFICANT CHANGE UP (ref 0–0.2)
BASOPHILS NFR BLD AUTO: 0 % — SIGNIFICANT CHANGE UP (ref 0–2)
BILIRUB SERPL-MCNC: 0.9 MG/DL — SIGNIFICANT CHANGE UP (ref 0.4–2)
BUN SERPL-MCNC: 14.4 MG/DL — SIGNIFICANT CHANGE UP (ref 8–20)
CALCIUM SERPL-MCNC: 9.1 MG/DL — SIGNIFICANT CHANGE UP (ref 8.6–10.2)
CHLORIDE SERPL-SCNC: 99 MMOL/L — SIGNIFICANT CHANGE UP (ref 98–107)
CO2 SERPL-SCNC: 22 MMOL/L — SIGNIFICANT CHANGE UP (ref 22–29)
CREAT SERPL-MCNC: 1 MG/DL — SIGNIFICANT CHANGE UP (ref 0.5–1.3)
EOSINOPHIL # BLD AUTO: 0.34 K/UL — SIGNIFICANT CHANGE UP (ref 0–0.5)
EOSINOPHIL NFR BLD AUTO: 2.6 % — SIGNIFICANT CHANGE UP (ref 0–6)
GLUCOSE SERPL-MCNC: 134 MG/DL — HIGH (ref 70–99)
HCT VFR BLD CALC: 43.3 % — SIGNIFICANT CHANGE UP (ref 39–50)
HGB BLD-MCNC: 15.1 G/DL — SIGNIFICANT CHANGE UP (ref 13–17)
LYMPHOCYTES # BLD AUTO: 0.9 K/UL — LOW (ref 1–3.3)
LYMPHOCYTES # BLD AUTO: 7 % — LOW (ref 13–44)
MANUAL SMEAR VERIFICATION: SIGNIFICANT CHANGE UP
MCHC RBC-ENTMCNC: 33.1 PG — SIGNIFICANT CHANGE UP (ref 27–34)
MCHC RBC-ENTMCNC: 34.9 GM/DL — SIGNIFICANT CHANGE UP (ref 32–36)
MCV RBC AUTO: 95 FL — SIGNIFICANT CHANGE UP (ref 80–100)
MONOCYTES # BLD AUTO: 1.24 K/UL — HIGH (ref 0–0.9)
MONOCYTES NFR BLD AUTO: 9.6 % — SIGNIFICANT CHANGE UP (ref 2–14)
NEUTROPHILS # BLD AUTO: 9.77 K/UL — HIGH (ref 1.8–7.4)
NEUTROPHILS NFR BLD AUTO: 75.6 % — SIGNIFICANT CHANGE UP (ref 43–77)
PLAT MORPH BLD: NORMAL — SIGNIFICANT CHANGE UP
PLATELET # BLD AUTO: 202 K/UL — SIGNIFICANT CHANGE UP (ref 150–400)
POLYCHROMASIA BLD QL SMEAR: SLIGHT — SIGNIFICANT CHANGE UP
POTASSIUM SERPL-MCNC: 4.2 MMOL/L — SIGNIFICANT CHANGE UP (ref 3.5–5.3)
POTASSIUM SERPL-SCNC: 4.2 MMOL/L — SIGNIFICANT CHANGE UP (ref 3.5–5.3)
PROT SERPL-MCNC: 7.6 G/DL — SIGNIFICANT CHANGE UP (ref 6.6–8.7)
RBC # BLD: 4.56 M/UL — SIGNIFICANT CHANGE UP (ref 4.2–5.8)
RBC # FLD: 13 % — SIGNIFICANT CHANGE UP (ref 10.3–14.5)
RBC BLD AUTO: NORMAL — SIGNIFICANT CHANGE UP
SODIUM SERPL-SCNC: 135 MMOL/L — SIGNIFICANT CHANGE UP (ref 135–145)
VARIANT LYMPHS # BLD: 5.2 % — SIGNIFICANT CHANGE UP (ref 0–6)
WBC # BLD: 12.92 K/UL — HIGH (ref 3.8–10.5)
WBC # FLD AUTO: 12.92 K/UL — HIGH (ref 3.8–10.5)

## 2022-02-17 PROCEDURE — 85025 COMPLETE CBC W/AUTO DIFF WBC: CPT

## 2022-02-17 PROCEDURE — 99152 MOD SED SAME PHYS/QHP 5/>YRS: CPT

## 2022-02-17 PROCEDURE — C1769: CPT

## 2022-02-17 PROCEDURE — 93458 L HRT ARTERY/VENTRICLE ANGIO: CPT | Mod: 26

## 2022-02-17 PROCEDURE — 36415 COLL VENOUS BLD VENIPUNCTURE: CPT

## 2022-02-17 PROCEDURE — 93010 ELECTROCARDIOGRAM REPORT: CPT

## 2022-02-17 PROCEDURE — 93458 L HRT ARTERY/VENTRICLE ANGIO: CPT

## 2022-02-17 PROCEDURE — 80053 COMPREHEN METABOLIC PANEL: CPT

## 2022-02-17 PROCEDURE — C1894: CPT

## 2022-02-17 PROCEDURE — 93005 ELECTROCARDIOGRAM TRACING: CPT

## 2022-02-17 PROCEDURE — C1887: CPT

## 2022-02-17 RX ORDER — ASPIRIN/CALCIUM CARB/MAGNESIUM 324 MG
81 TABLET ORAL ONCE
Refills: 0 | Status: COMPLETED | OUTPATIENT
Start: 2022-02-17 | End: 2022-02-17

## 2022-02-17 RX ORDER — BUDESONIDE AND FORMOTEROL FUMARATE DIHYDRATE 160; 4.5 UG/1; UG/1
2 AEROSOL RESPIRATORY (INHALATION)
Qty: 0 | Refills: 0 | DISCHARGE

## 2022-02-17 RX ORDER — UBIDECARENONE 100 MG
1 CAPSULE ORAL
Qty: 0 | Refills: 0 | DISCHARGE

## 2022-02-17 RX ADMIN — Medication 81 MILLIGRAM(S): at 11:45

## 2022-02-17 NOTE — DISCHARGE NOTE NURSING/CASE MANAGEMENT/SOCIAL WORK - NSDCPEFALRISK_GEN_ALL_CORE
For information on Fall & Injury Prevention, visit: [email or web address] OR  [email or web address] OR  https://www.cdc.gov/steadi/patient.html

## 2022-02-17 NOTE — ASU PATIENT PROFILE, ADULT - SURGICAL SITE INCISION
More than a week ago I offered prednisone but it seems as though he declined it at that time. Did he ever take it? If not, would he like it now? If so, prednisone 60 mg daily x3 days, then 40 mg x3 days, then 20 mg daily x3 days.   The EMG results should be back almost immediately no

## 2022-02-17 NOTE — PROGRESS NOTE ADULT - SUBJECTIVE AND OBJECTIVE BOX
Nurse Practitioner Progress note:     INTERVAL HISTORY: 70 y/o M with PMH AFib CHADS-Vasc 5(HTN, age, carotid plaque, CVA), HLD COPD, AFib on Eliquis with frequent falls. Referred for possible PRETTY occlusion/Watchman and CT scan revealed coronary calcifications. He presents for [facility] for further evaluation prior to Watchman.       TELEMETRY: NSR 62 bpm    T(C): 36.4 (02-17-22 @ 11:45), Max: 36.4 (02-17-22 @ 11:45)  HR: 74 (02-17-22 @ 11:45) (74 - 74)  BP: 144/83 (02-17-22 @ 11:45) (144/83 - 144/83)  RR: 17 (02-17-22 @ 11:45) (17 - 17)  SpO2: 95% (02-17-22 @ 11:45) (95% - 95%)  Wt(kg): --    PHYSICAL EXAM:  Appearance: Normal	  Cardiovascular: Normal S1 S2, No JVD, No murmurs, No edema  Respiratory: Lungs clear to auscultation	  Psychiatry: A & O x 3, Mood & affect appropriate  Gastrointestinal:  Soft, Non-tender, + BS	  Skin: No rashes, No ecchymoses, No cyanosis  Neurologic: Non-focal, A&O X3.  No neuro deficits  Extremities: Normal range of motion, No clubbing, cyanosis or edema  Vascular: Peripheral pulses palpable 2+ bilaterally  Procedure Site: Right radial band in place.  Site benign.  No bleeding/hematoma/ecchymosis. + palp radial pulse        MEDICATIONS DURING PROCEDURE:  versed 1mg  fentanyl 50 mcg  omnipaque 52 ml    PROCEDURE RESULTS: S/P LHC which revealed mild CAD (circ 50%) and EF NL.  Full report to follow.     ASSESSMENT/PLAN: 	  -Radial precautions  -D/C radial band in 1 hour  -Resume home meds  -Follow up with Dr. Franklin for Watchman procedure 3/4/22  -Discharge to home when criteria met

## 2022-02-17 NOTE — ASU PATIENT PROFILE, ADULT - FALL HARM RISK - HARM RISK INTERVENTIONS

## 2022-02-17 NOTE — ASU PATIENT PROFILE, ADULT - NSICDXPASTMEDICALHX_GEN_ALL_CORE_FT
PAST MEDICAL HISTORY:  Chronic atrial fibrillation     History of BPH     History of COPD     Hyperlipidemia, unspecified hyperlipidemia type     Hypertension     BRANDIN (obstructive sleep apnea)

## 2022-02-17 NOTE — DISCHARGE NOTE PROVIDER - HOSPITAL COURSE
70 y/o M with PMH AFib CHADS-Vasc 5(HTN, age, carotid plaque, CVA), HLD COPD, AFib on Eliquis with frequent falls. Referred for possible PRETTY occlusion/Watchman and CT scan revealed coronary calcifications. He presents for [facility] for further evaluation prior to Watchman. Now S/P LHC which revealed mild CAD (circ 50%) and EF NL.  Pt for Watchman procedure 3/4/22.

## 2022-02-17 NOTE — DISCHARGE NOTE PROVIDER - NSDCMRMEDTOKEN_GEN_ALL_CORE_FT
Aspir 81 oral delayed release tablet: 1 tab(s) orally once a day  atorvastatin 10 mg oral tablet: 1 tab(s) orally once a day  Eliquis 5 mg oral tablet: 1 tab(s) orally 2 times a day  escitalopram 20 mg oral tablet: 1 tab(s) orally once a day  fenofibrate 160 mg oral tablet: 1 tab(s) orally once a day  Fish Oil oral capsule:   Flomax 0.4 mg oral capsule: 1 cap(s) orally once a day (at bedtime)  melatonin 1 mg oral tablet: 2 tab(s) orally once a day (at bedtime)  terazosin 5 mg oral capsule: 1 cap(s) orally once a day (at bedtime)  Toprol-XL 25 mg oral tablet, extended release: 1 tab(s) orally once a day   Trelegy Ellipta 100 mcg-62.5 mcg-25 mcg/inh inhalation powder: 1 puff(s) inhaled once a day  Vitamin D3 400 intl units (10 mcg) oral tablet: 1 tab(s) orally once a day

## 2022-02-17 NOTE — DISCHARGE NOTE PROVIDER - NSDCFUSCHEDAPPT_GEN_ALL_CORE_FT
VICTOR MANUEL PEÑA ; 02/28/2022 ; [facility]  VICTOR MANUEL PEÑA ; 03/04/2022 ; [facility]  VICTOR MANUEL PEÑA ; 04/12/2022 ; [facility] [address] VICTOR MANUEL Zelaya ; 05/11/2022 ; [facility] [address]

## 2022-02-17 NOTE — DISCHARGE NOTE PROVIDER - NSDCCPCAREPLAN_GEN_ALL_CORE_FT
PRINCIPAL DISCHARGE DIAGNOSIS  Diagnosis: Paroxysmal atrial fibrillation  Assessment and Plan of Treatment: For Ct 3/4/22

## 2022-02-17 NOTE — DISCHARGE NOTE PROVIDER - CARE PROVIDER_API CALL
Marly Franklin)  Cardiac Electrophysiology; Cardiovascular Disease; Internal Medicine  [address] [place] [address]  Phone: [phone]  Fax: [phone]  Follow Up Time:     Alvaro Moon)  Cardiovascular Disease; Internal Medicine; Interventional Cardiology  [address] [place] [address]  Phone: [phone]  Fax: [phone]  Follow Up Time:

## 2022-02-17 NOTE — DISCHARGE NOTE PROVIDER - CARE PROVIDERS DIRECT ADDRESSES
,DirectAddress_Unknown,[email or web address]

## 2022-02-17 NOTE — DISCHARGE NOTE NURSING/CASE MANAGEMENT/SOCIAL WORK - PATIENT PORTAL LINK FT
You can access the FollowMyHealth Patient Portal offered by [facility] by registering at the following website: [email or web address]. By joining [employer]’s FollowMyHealth portal, you will also be able to view your health information using other applications (apps) compatible with our system.

## 2022-02-18 DIAGNOSIS — I25.10 ATHEROSCLEROTIC HEART DISEASE OF NATIVE CORONARY ARTERY WITHOUT ANGINA PECTORIS: ICD-10-CM

## 2022-02-28 ENCOUNTER — OUTPATIENT (OUTPATIENT)
Dept: OUTPATIENT SERVICES | Facility: HOSPITAL | Age: 72
LOS: 1 days | End: 2022-02-28
Payer: MEDICARE

## 2022-02-28 VITALS
DIASTOLIC BLOOD PRESSURE: 76 MMHG | HEIGHT: 71 IN | OXYGEN SATURATION: 98 % | SYSTOLIC BLOOD PRESSURE: 122 MMHG | WEIGHT: 220.46 LBS | TEMPERATURE: 98 F | HEART RATE: 56 BPM | RESPIRATION RATE: 18 BRPM

## 2022-02-28 DIAGNOSIS — G47.33 OBSTRUCTIVE SLEEP APNEA (ADULT) (PEDIATRIC): ICD-10-CM

## 2022-02-28 DIAGNOSIS — I48.0 PAROXYSMAL ATRIAL FIBRILLATION: ICD-10-CM

## 2022-02-28 DIAGNOSIS — I10 ESSENTIAL (PRIMARY) HYPERTENSION: ICD-10-CM

## 2022-02-28 DIAGNOSIS — J44.9 CHRONIC OBSTRUCTIVE PULMONARY DISEASE, UNSPECIFIED: ICD-10-CM

## 2022-02-28 DIAGNOSIS — Z90.49 ACQUIRED ABSENCE OF OTHER SPECIFIED PARTS OF DIGESTIVE TRACT: Chronic | ICD-10-CM

## 2022-02-28 DIAGNOSIS — Z01.818 ENCOUNTER FOR OTHER PREPROCEDURAL EXAMINATION: ICD-10-CM

## 2022-02-28 DIAGNOSIS — Z29.9 ENCOUNTER FOR PROPHYLACTIC MEASURES, UNSPECIFIED: ICD-10-CM

## 2022-02-28 LAB
ALBUMIN SERPL ELPH-MCNC: 4.1 G/DL — SIGNIFICANT CHANGE UP (ref 3.3–5.2)
ALP SERPL-CCNC: 81 U/L — SIGNIFICANT CHANGE UP (ref 40–120)
ALT FLD-CCNC: 33 U/L — SIGNIFICANT CHANGE UP
ANION GAP SERPL CALC-SCNC: 13 MMOL/L — SIGNIFICANT CHANGE UP (ref 5–17)
APTT BLD: 36.4 SEC — HIGH (ref 27.5–35.5)
AST SERPL-CCNC: 33 U/L — SIGNIFICANT CHANGE UP
BASOPHILS # BLD AUTO: 0.11 K/UL — SIGNIFICANT CHANGE UP (ref 0–0.2)
BASOPHILS NFR BLD AUTO: 1.3 % — SIGNIFICANT CHANGE UP (ref 0–2)
BILIRUB SERPL-MCNC: 0.5 MG/DL — SIGNIFICANT CHANGE UP (ref 0.4–2)
BLD GP AB SCN SERPL QL: SIGNIFICANT CHANGE UP
BUN SERPL-MCNC: 13.5 MG/DL — SIGNIFICANT CHANGE UP (ref 8–20)
CALCIUM SERPL-MCNC: 10 MG/DL — SIGNIFICANT CHANGE UP (ref 8.6–10.2)
CHLORIDE SERPL-SCNC: 100 MMOL/L — SIGNIFICANT CHANGE UP (ref 98–107)
CO2 SERPL-SCNC: 24 MMOL/L — SIGNIFICANT CHANGE UP (ref 22–29)
CREAT SERPL-MCNC: 0.99 MG/DL — SIGNIFICANT CHANGE UP (ref 0.5–1.3)
EGFR: 80 ML/MIN/1.73M2 — SIGNIFICANT CHANGE UP
EOSINOPHIL # BLD AUTO: 0.29 K/UL — SIGNIFICANT CHANGE UP (ref 0–0.5)
EOSINOPHIL NFR BLD AUTO: 3.4 % — SIGNIFICANT CHANGE UP (ref 0–6)
GLUCOSE SERPL-MCNC: 139 MG/DL — HIGH (ref 70–99)
HCT VFR BLD CALC: 41.5 % — SIGNIFICANT CHANGE UP (ref 39–50)
HGB BLD-MCNC: 13.8 G/DL — SIGNIFICANT CHANGE UP (ref 13–17)
IMM GRANULOCYTES NFR BLD AUTO: 1 % — SIGNIFICANT CHANGE UP (ref 0–1.5)
INR BLD: 1.64 RATIO — HIGH (ref 0.88–1.16)
LYMPHOCYTES # BLD AUTO: 1.72 K/UL — SIGNIFICANT CHANGE UP (ref 1–3.3)
LYMPHOCYTES # BLD AUTO: 19.9 % — SIGNIFICANT CHANGE UP (ref 13–44)
MAGNESIUM SERPL-MCNC: 1.8 MG/DL — SIGNIFICANT CHANGE UP (ref 1.6–2.6)
MCHC RBC-ENTMCNC: 31.4 PG — SIGNIFICANT CHANGE UP (ref 27–34)
MCHC RBC-ENTMCNC: 33.3 GM/DL — SIGNIFICANT CHANGE UP (ref 32–36)
MCV RBC AUTO: 94.5 FL — SIGNIFICANT CHANGE UP (ref 80–100)
MONOCYTES # BLD AUTO: 1.08 K/UL — HIGH (ref 0–0.9)
MONOCYTES NFR BLD AUTO: 12.5 % — SIGNIFICANT CHANGE UP (ref 2–14)
NEUTROPHILS # BLD AUTO: 5.35 K/UL — SIGNIFICANT CHANGE UP (ref 1.8–7.4)
NEUTROPHILS NFR BLD AUTO: 61.9 % — SIGNIFICANT CHANGE UP (ref 43–77)
PLATELET # BLD AUTO: 216 K/UL — SIGNIFICANT CHANGE UP (ref 150–400)
POTASSIUM SERPL-MCNC: 4.4 MMOL/L — SIGNIFICANT CHANGE UP (ref 3.5–5.3)
POTASSIUM SERPL-SCNC: 4.4 MMOL/L — SIGNIFICANT CHANGE UP (ref 3.5–5.3)
PROT SERPL-MCNC: 7.4 G/DL — SIGNIFICANT CHANGE UP (ref 6.6–8.7)
PROTHROM AB SERPL-ACNC: 19.1 SEC — HIGH (ref 10.5–13.4)
RBC # BLD: 4.39 M/UL — SIGNIFICANT CHANGE UP (ref 4.2–5.8)
RBC # FLD: 12.8 % — SIGNIFICANT CHANGE UP (ref 10.3–14.5)
SARS-COV-2 RNA SPEC QL NAA+PROBE: SIGNIFICANT CHANGE UP
SODIUM SERPL-SCNC: 137 MMOL/L — SIGNIFICANT CHANGE UP (ref 135–145)
WBC # BLD: 8.64 K/UL — SIGNIFICANT CHANGE UP (ref 3.8–10.5)
WBC # FLD AUTO: 8.64 K/UL — SIGNIFICANT CHANGE UP (ref 3.8–10.5)

## 2022-02-28 PROCEDURE — G0463: CPT

## 2022-02-28 PROCEDURE — 93005 ELECTROCARDIOGRAM TRACING: CPT

## 2022-02-28 PROCEDURE — 93010 ELECTROCARDIOGRAM REPORT: CPT

## 2022-02-28 NOTE — ASU PATIENT PROFILE, ADULT - FALL HARM RISK - UNIVERSAL INTERVENTIONS
Bed in lowest position, wheels locked, appropriate side rails in place/Call bell, personal items and telephone in reach/Instruct patient to call for assistance before getting out of bed or chair/Non-slip footwear when patient is out of bed/[place] to call system/Physically safe environment - no spills, clutter or unnecessary equipment/Purposeful Proactive Rounding/Room/bathroom lighting operational, light cord in reach

## 2022-02-28 NOTE — ASU PATIENT PROFILE, ADULT - VISION (WITH CORRECTIVE LENSES IF THE PATIENT USUALLY WEARS THEM):
TRANSITIONAL CARE MANAGEMENT - HOSPITAL DISCHARGE FOLLOW-UP    Contacted Ms. Doss regarding follow-up for:  1.Crohn's Disease, likely flare  2. Abdominal pain, left lower quadrant  3. Prediabetes     She was discharged from the hospital on 3/29/2021. Review of the After Visit Summary from the recent hospitalization indicates that the patient needs to:  · Follow up with PCP   · Follow up with Dr. Peng, Gastroenterology     She feels that she is doing very well at home.   Her diet concern is none. Overall, the patient is eating well.   Ambulation: staying the same  Fever: is not present  Pain: none  Activities of Daily Living (global): Self-care   Patient states that she does have sufficient family support. She feels that she is able to ask for assistance when needed.     Additional patient/family concerns:   · Please discuss test results (lab & imaging)   · Would like to have advanced directive paperwork when comes to clinic    Discharge medications were verified with the patient. She is fully compliant with the medication regimen prescribed at the time of discharge. She reports that she is not experiencing any medication side effects.    Upon discharge, the patient was to receive specialist follow-up with Dr. Peng, Gastroenterology. These services have been initiated by Gastroenterology department and no further action is needed by this department.     Advance Directive:   The patient has the following documents:  No Advance Directives on file. Patient offered documents.    Patient has an appointment on 04/20/2021 with Liana Ford MD. Ms. Doss was reminded about the importance of keeping this appointment.     Writer unable to schedule TCM within 2 weeks:  · Patient stated any day of the week except mondays are best for scheduling.   · Patient would like to only schedule with PCP, PCP soonest availability is 4/16/21 but patient stated she has a different appointment that day.   · PCP next availability  4/20/21.    wears glasses/Partially impaired: cannot see medication labels or newsprint, but can see obstacles in path, and the surrounding layout; can count fingers at arm's length

## 2022-02-28 NOTE — H&P PST ADULT - ASSESSMENT
Plan: CINTIA/Watchmans procedure  Labs pending.   Pre-procedure instructions provided (verbal & written) as follows: Patient educated on  preadmission instructions, and day of procedure medications, verbalizes understanding. Pt instructed to stop vitamins/supplements/herbal medications/NSAIDS for one week prior to surgery and discuss with PMD.   Watchman implant 3/4/2021    - Last dose Eliquis 3/2/22 PM dose, hold for 36 hours as per Dr Franklin, patient educated not to take Eliquis on 3/3 or day of procedure 3/4, pt verbalized understanding   - NPO after midnight prior except meds w/ sips of water.    - Take metoprolol AM of procedure   - Continue aspirin uninterrupted, use inhaler DOP    CAPRINI SCORE    AGE RELATED RISK FACTORS                                                             [ ] Age 41-60 years                                            (1 Point)  [x ] Age: 61-74 years                                           (2 Points)                 [ ] Age= 75 years                                                (3 Points)             DISEASE RELATED RISK FACTORS                                                       [ ] Edema in the lower extremities                 (1 Point)                     [ ] Varicose veins                                               (1 Point)                                 [x ] BMI > 25 Kg/m2                                            (1 Point)                                  [ ] Serious infection (ie PNA)                            (1 Point)                     [x ] Lung disease ( COPD, Emphysema)            (1 Point)                                                                          [ ] Acute myocardial infarction                         (1 Point)                  [ ] Congestive heart failure (in the previous month)  (1 Point)         [ ] Inflammatory bowel disease                            (1 Point)                  [ ] Central venous access, PICC or Port               (2 points)       (within the last month)                                                                [ ] Stroke (in the previous month)                        (5 Points)    [ ] Previous or present malignancy                       (2 points)                                                                                                                                                         HEMATOLOGY RELATED FACTORS                                                         [ ] Prior episodes of VTE                                     (3 Points)                     [ ] Positive family history for VTE                      (3 Points)                  [ ] Prothrombin 15336 A                                     (3 Points)                     [ ] Factor V Leiden                                                (3 Points)                        [ ] Lupus anticoagulants                                      (3 Points)                                                           [ ] Anticardiolipin antibodies                              (3 Points)                                                       [ ] High homocysteine in the blood                   (3 Points)                                             [ ] Other congenital or acquired thrombophilia      (3 Points)                                                [ ] Heparin induced thrombocytopenia                  (3 Points)                                        MOBILITY RELATED FACTORS  [ ] Bed rest                                                         (1 Point)  [ ] Plaster cast                                                    (2 points)  [ ] Bed bound for more than 72 hours           (2 Points)    GENDER SPECIFIC FACTORS  [ ] Pregnancy or had a baby within the last month   (1 Point)  [ ] Post-partum < 6 weeks                                   (1 Point)  [ ] Hormonal therapy  or oral contraception   (1 Point)  [ ] History of pregnancy complications              (1 point)  [ ] Unexplained or recurrent              (1 Point)    OTHER RISK FACTORS                                           (1 Point)  [ ] BMI >40, smoking, diabetes requiring insulin, chemotherapy  blood transfusions and length of surgery over 2 hours    SURGERY RELATED RISK FACTORS  [ ]  Section within the last month     (1 Point)  [ ] Minor surgery                                                  (1 Point)  [ ] Arthroscopic surgery                                       (2 Points)  [ x] Planned major surgery lasting more            (2 Points)      than 45 minutes     [ ] Elective hip or knee joint replacement       (5 points)       surgery                                                TRAUMA RELATED RISK FACTORS  [ ] Fracture of the hip, pelvis, or leg                       (5 Points)  [ ] Spinal cord injury resulting in paralysis             (5 points)       (in the previous month)    [ ] Paralysis  (less than 1 month)                             (5 Points)  [ ] Multiple Trauma within 1 month                        (5 Points)    Total Score [   6     ]    Caprini Score 0-2: Low Risk, NO VTE prophylaxis required for most patients, encourage ambulation  Caprini Score 3-6: Moderate Risk , pharmacologic VTE prophylaxis is indicated for most patients (in the absence of contraindications)  Caprini Score Greater than or =7: High risk, pharmocologic VTE prophylaxis indicated for most patients (in the absence of contraindications    OPIOID RISK TOOL    BEN EACH BOX THAT APPLIES AND ADD TOTALS AT THE END    FAMILY HISTORY OF SUBSTANCE ABUSE                 FEMALE         MALE                                                Alcohol                             [  ]1 pt          [  ]3pts                                               Illegal Durgs                     [  ]2 pts        [  ]3pts                                               Rx Drugs                           [  ]4 pts        [  ]4 pts    PERSONAL HISTORY OF SUBSTANCE ABUSE                                                                                          Alcohol                             [  ]3 pts       [  ]3 pts                                               Illegal Durgs                     [  ]4 pts        [  ]4 pts                                               Rx Drugs                           [  ]5 pts        [  ]5 pts    AGE BETWEEN 16-45 YEARS                                      [  ]1 pt         [  ]1 pt    HISTORY OF PREADOLESCENT   SEXUAL ABUSE                                                             [  ]3 pts        [  ]0pts    PSYCHOLOGICAL DISEASE                     ADD, OCD, Bipolar, Schizophrenia        [  ]2 pts         [  ]2 pts                      Depression                                               [  ]1 pt           [ x ]1 pt           SCORING TOTAL               1              A score of 3 or lower indicated LOW risk for future opiod abuse  A score of 4 to 7 indicated moderate risk for future opiod abuse  A score of 8 or higher indicates a high risk for opiod abuse

## 2022-02-28 NOTE — H&P PST ADULT - PROBLEM SELECTOR PLAN 1
Pt scheduled for CINTIA/Watchman on 3/4/22. Given instructions on eliquis, verbal and written instruction provided, pt verbalized understanding.  Patient swabbed for COVID today at PST.

## 2022-03-04 ENCOUNTER — TRANSCRIPTION ENCOUNTER (OUTPATIENT)
Age: 72
End: 2022-03-04

## 2022-03-04 ENCOUNTER — INPATIENT (INPATIENT)
Facility: HOSPITAL | Age: 72
LOS: 0 days | Discharge: HOME | DRG: 274 | End: 2022-03-05
Attending: STUDENT IN AN ORGANIZED HEALTH CARE EDUCATION/TRAINING PROGRAM | Admitting: STUDENT IN AN ORGANIZED HEALTH CARE EDUCATION/TRAINING PROGRAM
Payer: MEDICARE

## 2022-03-04 VITALS
TEMPERATURE: 98 F | WEIGHT: 220.02 LBS | OXYGEN SATURATION: 97 % | RESPIRATION RATE: 16 BRPM | HEART RATE: 51 BPM | HEIGHT: 71 IN

## 2022-03-04 DIAGNOSIS — Z90.49 ACQUIRED ABSENCE OF OTHER SPECIFIED PARTS OF DIGESTIVE TRACT: Chronic | ICD-10-CM

## 2022-03-04 DIAGNOSIS — I48.0 PAROXYSMAL ATRIAL FIBRILLATION: ICD-10-CM

## 2022-03-04 LAB — GLUCOSE BLDC GLUCOMTR-MCNC: 131 MG/DL — HIGH (ref 70–99)

## 2022-03-04 PROCEDURE — 93010 ELECTROCARDIOGRAM REPORT: CPT

## 2022-03-04 PROCEDURE — 93312 ECHO TRANSESOPHAGEAL: CPT | Mod: 26

## 2022-03-04 PROCEDURE — 33340 PERQ CLSR TCAT L ATR APNDGE: CPT | Mod: Q0

## 2022-03-04 PROCEDURE — 93325 DOPPLER ECHO COLOR FLOW MAPG: CPT | Mod: 26

## 2022-03-04 PROCEDURE — 76376 3D RENDER W/INTRP POSTPROCES: CPT | Mod: 26

## 2022-03-04 PROCEDURE — 93320 DOPPLER ECHO COMPLETE: CPT | Mod: 26

## 2022-03-04 RX ORDER — OMEGA-3 ACID ETHYL ESTERS 1 G
4 CAPSULE ORAL DAILY
Refills: 0 | Status: DISCONTINUED | OUTPATIENT
Start: 2022-03-04 | End: 2022-03-05

## 2022-03-04 RX ORDER — ESCITALOPRAM OXALATE 10 MG/1
20 TABLET, FILM COATED ORAL DAILY
Refills: 0 | Status: DISCONTINUED | OUTPATIENT
Start: 2022-03-04 | End: 2022-03-05

## 2022-03-04 RX ORDER — ASPIRIN/CALCIUM CARB/MAGNESIUM 324 MG
81 TABLET ORAL DAILY
Refills: 0 | Status: DISCONTINUED | OUTPATIENT
Start: 2022-03-04 | End: 2022-03-05

## 2022-03-04 RX ORDER — METOPROLOL TARTRATE 50 MG
25 TABLET ORAL DAILY
Refills: 0 | Status: DISCONTINUED | OUTPATIENT
Start: 2022-03-04 | End: 2022-03-05

## 2022-03-04 RX ORDER — FENOFIBRATE,MICRONIZED 130 MG
145 CAPSULE ORAL DAILY
Refills: 0 | Status: DISCONTINUED | OUTPATIENT
Start: 2022-03-04 | End: 2022-03-05

## 2022-03-04 RX ORDER — TAMSULOSIN HYDROCHLORIDE 0.4 MG/1
0.4 CAPSULE ORAL AT BEDTIME
Refills: 0 | Status: DISCONTINUED | OUTPATIENT
Start: 2022-03-04 | End: 2022-03-05

## 2022-03-04 RX ORDER — CHOLECALCIFEROL (VITAMIN D3) 125 MCG
400 CAPSULE ORAL DAILY
Refills: 0 | Status: DISCONTINUED | OUTPATIENT
Start: 2022-03-04 | End: 2022-03-05

## 2022-03-04 RX ORDER — MAGNESIUM SULFATE 500 MG/ML
2 VIAL (ML) INJECTION ONCE
Refills: 0 | Status: COMPLETED | OUTPATIENT
Start: 2022-03-04 | End: 2022-03-04

## 2022-03-04 RX ORDER — ATORVASTATIN CALCIUM 80 MG/1
10 TABLET, FILM COATED ORAL AT BEDTIME
Refills: 0 | Status: DISCONTINUED | OUTPATIENT
Start: 2022-03-04 | End: 2022-03-05

## 2022-03-04 RX ORDER — LANOLIN ALCOHOL/MO/W.PET/CERES
1 CREAM (GRAM) TOPICAL AT BEDTIME
Refills: 0 | Status: DISCONTINUED | OUTPATIENT
Start: 2022-03-04 | End: 2022-03-05

## 2022-03-04 RX ORDER — APIXABAN 2.5 MG/1
5 TABLET, FILM COATED ORAL EVERY 12 HOURS
Refills: 0 | Status: DISCONTINUED | OUTPATIENT
Start: 2022-03-05 | End: 2022-03-05

## 2022-03-04 RX ADMIN — ATORVASTATIN CALCIUM 10 MILLIGRAM(S): 80 TABLET, FILM COATED ORAL at 18:50

## 2022-03-04 RX ADMIN — Medication 145 MILLIGRAM(S): at 18:50

## 2022-03-04 RX ADMIN — Medication 400 UNIT(S): at 18:50

## 2022-03-04 RX ADMIN — Medication 25 GRAM(S): at 20:53

## 2022-03-04 RX ADMIN — ESCITALOPRAM OXALATE 20 MILLIGRAM(S): 10 TABLET, FILM COATED ORAL at 18:50

## 2022-03-04 RX ADMIN — TAMSULOSIN HYDROCHLORIDE 0.4 MILLIGRAM(S): 0.4 CAPSULE ORAL at 18:50

## 2022-03-04 RX ADMIN — Medication 4 GRAM(S): at 18:50

## 2022-03-04 RX ADMIN — Medication 81 MILLIGRAM(S): at 18:49

## 2022-03-04 RX ADMIN — Medication 1 MILLIGRAM(S): at 18:50

## 2022-03-04 NOTE — PROGRESS NOTE ADULT - ATTENDING COMMENTS
Seen and examined, well known to me. High stroke and bleding risk with AC. Here for PRETTY closure with Watchman. All benefits, risks and alternatives were discussed. Risks explained and include but not limited to bleeding, dislogment, infection, stroke, heart attack, cardiac perforation, and death. He provided informed consent
agree, will consider bedside POCUS at the end of the day

## 2022-03-04 NOTE — CHART NOTE - NSCHARTNOTEFT_GEN_A_CORE
Limited TTE performed personally at bedside reveals normal LV function and trace effusion vs fat pad  Would recommend official limited TTE tomorrow AM. If unchanged ok for discharge  Eliquis will be held till then.

## 2022-03-04 NOTE — PROGRESS NOTE ADULT - SUBJECTIVE AND OBJECTIVE BOX
ELECTROPHYSIOLOGY BRIEF POST-OP NOTE    I have personally seen and examined the patient. No acute post operative complaints    PRE-OP DIAGNOSIS: Paroxysmal atrial fibrillation    POST-OP DIAGNOSIS: None    PROCEDURE: CINTIA followed by 21mm Watchman device insertion - 1 deployment    COMPLICATIONS: None    Physician: Marly Franklin MD  Assistant: None    ESTIMATED BLOOD LOSS: <10mL    ANESTHESIA TYPE:  [ x ]General Anesthesia  [  ]Sedation  [  ]Local/Regional    CONDITION:  [  ]Critical  [  ]Serious  [ x ]Stable  [  ]Good    FINDINGS: no clot in PRETTY - No para device flow post deployment    EKG:     Vital Signs   HR:   BP:  RR: 16   SpO2: 97%     Physical Exam:  Constitutional: NAD, AAOx3  Cardiovascular: +S1S2 RRR  Pulmonary: CTA b/l, unlabored  GI: soft NTND +BS  Extremities: no pedal edema, +distal pulses b/l  Neuro: non focal, WINSLOW x4    A/P  71 year old male patient with a history of HTN, HLD, COPD, BRANDIN uses mouth piece, and paroxysmal atrial fibrillation unable to tolerate long term anticoagulation due to frequent falls. He is now s/p successful uncomplicated CINTIA followed by 21mm Watchman device insertion via right femoral vein - 1 deployment    -Bedrest x 6 hours  -start Eliquis 5mg po Q12hr, starting tomorrow AM  -Limited TTE in 4 hours. Will be done personally.   -AM labs and EKG  -Figure 8 suture to be removed by EP staff in AM  -Discharge planning home tomorrow if stable.                                                                          ELECTROPHYSIOLOGY BRIEF POST-OP NOTE    I have personally seen and examined the patient. No acute post operative complaints    PRE-OP DIAGNOSIS: Paroxysmal atrial fibrillation    POST-OP DIAGNOSIS: None    PROCEDURE: CINTIA followed by 21mm Watchman device insertion - 1 deployment    COMPLICATIONS: None    Physician: Marly Franklin MD  Assistant: None    ESTIMATED BLOOD LOSS: <10mL    ANESTHESIA TYPE:  [ x ]General Anesthesia  [  ]Sedation  [  ]Local/Regional    CONDITION:  [  ]Critical  [  ]Serious  [ x ]Stable  [  ]Good    FINDINGS: no clot in PRETTY - No para device flow post deployment; EF 60%    EKG: SR at 53bpm; qRSD 96ms     Vital Signs   HR: 51  BP: 116/56  RR: 16   SpO2: 97%     Physical Exam:  Constitutional: NAD, AAOx3  Cardiovascular: +S1S2 RRR  Pulmonary: CTA b/l, unlabored  GI: soft NTND +BS  Extremities: no pedal edema  Right Groin: Dressing with figure 8 suture CDI; no evidence of hematoma   Neuro: non focal, WINSLOW x4    A/P  71 year old male patient with a history of HTN, HLD, COPD, BRANDIN uses mouth piece, and paroxysmal atrial fibrillation unable to tolerate long term anticoagulation due to frequent falls. He is now s/p successful uncomplicated CINTIA followed by 21mm Watchman device insertion via right femoral vein - 1 deployment    -Bedrest x 6 hours  -start Eliquis 5mg po Q12hr, starting tomorrow AM  -Limited TTE in 4 hours. Will be done personally.   -AM labs and EKG  -Figure 8 suture to be removed by EP staff in AM  -Discharge planning home tomorrow if stable.

## 2022-03-04 NOTE — DISCHARGE NOTE PROVIDER - NSDCCPTREATMENT_GEN_ALL_CORE_FT
PRINCIPAL PROCEDURE  Procedure: Insertion, Watchman left atrial appendage closure device  Findings and Treatment: - Bruising at the groin, sometimes extending down the leg, and/or a small lump under the skin at the groin access site is normal and will resolve within 2 – 3 weeks.   - Occasional skipped beats or palpitations that last for a few beats are common and generally resolve within 1-2 months.   - You make walk and take stairs at a regular pace.   - Do not perform any exercise more strenuous than walking for 1 week.   - Do not strain or lift heavy objects for 1 week.  - You may shower the day after the procedure.  - Do not soak in water (such as tub baths, hot tubs, swimming, etc.) for 1 week.   - You may resume all other activities the day after the procedure.  Call your doctor if:   - you notice bleeding, redness, drainage, swelling, increased tenderness or a hot sensation around the catheter insertion site.   - your temperature is greater than 100 degrees F for more than 24 hours.  - your rapid heart rhythm returns.  - you have any questions or concerns regarding the procedure.  If significant bleeding and/or a large lump (the size of a golf ball or bigger) occurs:  - Lie flat and apply continuous direct pressure just above the puncture site for at least 10 minutes  - If the issue resolves, notify your physician immediately.    - If the bleeding cannot be controlled, please seek immediate medical attention.  If you experience increased difficulty breathing or chest pain, or if you faint or have dizzy spells, please seek immediate medical attention.

## 2022-03-04 NOTE — DISCHARGE NOTE PROVIDER - NSDCMRMEDTOKEN_GEN_ALL_CORE_FT
Aspir 81 oral delayed release tablet: 1 tab(s) orally once a day  atorvastatin 10 mg oral tablet: 1 tab(s) orally once a day  Eliquis 5 mg oral tablet: 1 tab(s) orally 2 times a day  escitalopram 20 mg oral tablet: 1 tab(s) orally once a day  fenofibrate 160 mg oral tablet: 1 tab(s) orally once a day  Fish Oil oral capsule:   Flomax 0.4 mg oral capsule: 1 cap(s) orally once a day (at bedtime)  melatonin 1 mg oral tablet: 2 tab(s) orally once a day (at bedtime)  terazosin 5 mg oral capsule: 1 cap(s) orally once a day (at bedtime)  Toprol-XL 25 mg oral tablet, extended release: 1 tab(s) orally once a day   Trelegy Ellipta 100 mcg-62.5 mcg-25 mcg/inh inhalation powder: 1 puff(s) inhaled once a day  Vitamin D3 400 intl units (10 mcg) oral tablet: 1 tab(s) orally once a day   apixaban 5 mg oral tablet: 1 tab(s) orally every 12 hours  Aspir 81 oral delayed release tablet: 1 tab(s) orally once a day  atorvastatin 10 mg oral tablet: 1 tab(s) orally once a day  Eliquis 5 mg oral tablet: 1 tab(s) orally 2 times a day  escitalopram 20 mg oral tablet: 1 tab(s) orally once a day  fenofibrate 160 mg oral tablet: 1 tab(s) orally once a day  Flomax 0.4 mg oral capsule: 1 cap(s) orally once a day (at bedtime)  melatonin 1 mg oral tablet: 2 tab(s) orally once a day (at bedtime)  omega-3 polyunsaturated fatty acids ethyl esters 1000 mg oral capsule: 4 cap(s) orally once a day  terazosin 5 mg oral capsule: 1 cap(s) orally once a day (at bedtime)  Toprol-XL 25 mg oral tablet, extended release: 1 tab(s) orally once a day   Trelegy Ellipta 100 mcg-62.5 mcg-25 mcg/inh inhalation powder: 1 puff(s) inhaled once a day  Vitamin D3 400 intl units (10 mcg) oral tablet: 1 tab(s) orally once a day   Aspir 81 oral delayed release tablet: 1 tab(s) orally once a day  atorvastatin 10 mg oral tablet: 1 tab(s) orally once a day  Eliquis 5 mg oral tablet: 1 tab(s) orally 2 times a day  escitalopram 20 mg oral tablet: 1 tab(s) orally once a day  fenofibrate 160 mg oral tablet: 1 tab(s) orally once a day  Flomax 0.4 mg oral capsule: 1 cap(s) orally once a day (at bedtime)  melatonin 1 mg oral tablet: 2 tab(s) orally once a day (at bedtime)  omega-3 polyunsaturated fatty acids ethyl esters 1000 mg oral capsule: 4 cap(s) orally once a day  terazosin 5 mg oral capsule: 1 cap(s) orally once a day (at bedtime)  Toprol-XL 25 mg oral tablet, extended release: 1 tab(s) orally once a day   Trelegy Ellipta 100 mcg-62.5 mcg-25 mcg/inh inhalation powder: 1 puff(s) inhaled once a day  Vitamin D3 400 intl units (10 mcg) oral tablet: 1 tab(s) orally once a day

## 2022-03-04 NOTE — DISCHARGE NOTE PROVIDER - NSDCFUSCHEDAPPT_GEN_ALL_CORE_FT
[facility] ; 04/12/2022 ; [facility] [address] ; 05/11/2022 ; [facility] [address]

## 2022-03-04 NOTE — DISCHARGE NOTE PROVIDER - HOSPITAL COURSE
71 year old male patient with a history of HTN, HLD, COPD, BRANDIN uses mouth piece, and paroxysmal atrial fibrillation unable to tolerate long term anticoagulation due to frequent falls. He is now POD #1 s/p successful uncomplicated CINTIA followed by 21mm Watchman device insertion via right femoral vein.

## 2022-03-04 NOTE — DISCHARGE NOTE PROVIDER - NSDCFUADDINST_GEN_ALL_CORE_FT
Our office will contact you in 3-5 days to schedule this appointment. Please call [phone] with questions or concerns.

## 2022-03-04 NOTE — PROGRESS NOTE ADULT - SUBJECTIVE AND OBJECTIVE BOX
Pt seen and examined at time of arrival to holding area. Pt presenting for CINTIA / Watchman device placement. Pt reports last PO intake yesterday evening and COVID-19 PCR negative 2/28/22. Last Eliquis dose 3/2/22 evening as per pt. Pt reports no complaints at this time. Will continue to monitor pending procedure.

## 2022-03-04 NOTE — DISCHARGE NOTE PROVIDER - CARE PROVIDER_API CALL
Marly Franklin)  Cardiac Electrophysiology; Cardiovascular Disease; Internal Medicine  [address] [place] [id number]  Phone: [phone]  Fax: [phone]  Established Patient  Follow Up Time: 1 month

## 2022-03-05 ENCOUNTER — TRANSCRIPTION ENCOUNTER (OUTPATIENT)
Age: 72
End: 2022-03-05

## 2022-03-05 VITALS — HEART RATE: 56 BPM | DIASTOLIC BLOOD PRESSURE: 59 MMHG | TEMPERATURE: 98 F | SYSTOLIC BLOOD PRESSURE: 121 MMHG

## 2022-03-05 LAB
ANION GAP SERPL CALC-SCNC: 11 MMOL/L — SIGNIFICANT CHANGE UP (ref 5–17)
BUN SERPL-MCNC: 16.6 MG/DL — SIGNIFICANT CHANGE UP (ref 8–20)
CALCIUM SERPL-MCNC: 8.5 MG/DL — LOW (ref 8.6–10.2)
CHLORIDE SERPL-SCNC: 102 MMOL/L — SIGNIFICANT CHANGE UP (ref 98–107)
CO2 SERPL-SCNC: 22 MMOL/L — SIGNIFICANT CHANGE UP (ref 22–29)
CREAT SERPL-MCNC: 1.02 MG/DL — SIGNIFICANT CHANGE UP (ref 0.5–1.3)
EGFR: 78 ML/MIN/1.73M2 — SIGNIFICANT CHANGE UP
GLUCOSE SERPL-MCNC: 192 MG/DL — HIGH (ref 70–99)
HCT VFR BLD CALC: 37.2 % — LOW (ref 39–50)
HGB BLD-MCNC: 12.7 G/DL — LOW (ref 13–17)
MAGNESIUM SERPL-MCNC: 2.2 MG/DL — SIGNIFICANT CHANGE UP (ref 1.6–2.6)
MCHC RBC-ENTMCNC: 31.9 PG — SIGNIFICANT CHANGE UP (ref 27–34)
MCHC RBC-ENTMCNC: 34.1 GM/DL — SIGNIFICANT CHANGE UP (ref 32–36)
MCV RBC AUTO: 93.5 FL — SIGNIFICANT CHANGE UP (ref 80–100)
PLATELET # BLD AUTO: 196 K/UL — SIGNIFICANT CHANGE UP (ref 150–400)
POTASSIUM SERPL-MCNC: 4.2 MMOL/L — SIGNIFICANT CHANGE UP (ref 3.5–5.3)
POTASSIUM SERPL-SCNC: 4.2 MMOL/L — SIGNIFICANT CHANGE UP (ref 3.5–5.3)
RBC # BLD: 3.98 M/UL — LOW (ref 4.2–5.8)
RBC # FLD: 12.6 % — SIGNIFICANT CHANGE UP (ref 10.3–14.5)
SODIUM SERPL-SCNC: 135 MMOL/L — SIGNIFICANT CHANGE UP (ref 135–145)
WBC # BLD: 12.37 K/UL — HIGH (ref 3.8–10.5)
WBC # FLD AUTO: 12.37 K/UL — HIGH (ref 3.8–10.5)

## 2022-03-05 PROCEDURE — 83735 ASSAY OF MAGNESIUM: CPT

## 2022-03-05 PROCEDURE — 93308 TTE F-UP OR LMTD: CPT

## 2022-03-05 PROCEDURE — C1893: CPT

## 2022-03-05 PROCEDURE — 93320 DOPPLER ECHO COMPLETE: CPT

## 2022-03-05 PROCEDURE — C1769: CPT

## 2022-03-05 PROCEDURE — 93325 DOPPLER ECHO COLOR FLOW MAPG: CPT

## 2022-03-05 PROCEDURE — 93005 ELECTROCARDIOGRAM TRACING: CPT

## 2022-03-05 PROCEDURE — 82962 GLUCOSE BLOOD TEST: CPT

## 2022-03-05 PROCEDURE — 93312 ECHO TRANSESOPHAGEAL: CPT

## 2022-03-05 PROCEDURE — 85027 COMPLETE CBC AUTOMATED: CPT

## 2022-03-05 PROCEDURE — 33340 PERQ CLSR TCAT L ATR APNDGE: CPT

## 2022-03-05 PROCEDURE — 36415 COLL VENOUS BLD VENIPUNCTURE: CPT

## 2022-03-05 PROCEDURE — C1894: CPT

## 2022-03-05 PROCEDURE — 93010 ELECTROCARDIOGRAM REPORT: CPT

## 2022-03-05 PROCEDURE — 80048 BASIC METABOLIC PNL TOTAL CA: CPT

## 2022-03-05 PROCEDURE — C1887: CPT

## 2022-03-05 PROCEDURE — 93308 TTE F-UP OR LMTD: CPT | Mod: 26

## 2022-03-05 PROCEDURE — C1889: CPT

## 2022-03-05 RX ORDER — OMEGA-3 ACID ETHYL ESTERS 1 G
4 CAPSULE ORAL
Qty: 0 | Refills: 0 | DISCHARGE
Start: 2022-03-05

## 2022-03-05 RX ORDER — OMEGA-3 ACID ETHYL ESTERS 1 G
0 CAPSULE ORAL
Qty: 0 | Refills: 0 | DISCHARGE

## 2022-03-05 RX ORDER — APIXABAN 2.5 MG/1
1 TABLET, FILM COATED ORAL
Qty: 0 | Refills: 0 | DISCHARGE
Start: 2022-03-05

## 2022-03-05 RX ADMIN — APIXABAN 5 MILLIGRAM(S): 2.5 TABLET, FILM COATED ORAL at 05:25

## 2022-03-05 RX ADMIN — Medication 25 MILLIGRAM(S): at 05:25

## 2022-03-05 NOTE — PROGRESS NOTE ADULT - SUBJECTIVE AND OBJECTIVE BOX
Pt doing well POD #1 s/p LAAO with Watchman device via RFV. Pt seen and examined this morning, reports feeling well, denies complaints. Morning labs and telemetry reviewed. Right groin suture removed without incident.     EKG: Sinus iris at 55bpm, iRBBB  TELE: SR with episodes of nocturnal SB     MEDICATIONS  (STANDING):  apixaban 5 milliGRAM(s) Oral every 12 hours  aspirin enteric coated 81 milliGRAM(s) Oral daily  atorvastatin 10 milliGRAM(s) Oral at bedtime  cholecalciferol 400 Unit(s) Oral daily  escitalopram 20 milliGRAM(s) Oral daily  fenofibrate Tablet 145 milliGRAM(s) Oral daily  melatonin 1 milliGRAM(s) Oral at bedtime  metoprolol succinate ER 25 milliGRAM(s) Oral daily  omega-3-Acid Ethyl Esters 4 Gram(s) Oral daily  tamsulosin 0.4 milliGRAM(s) Oral at bedtime      Allergies  No Known Allergies      PAST MEDICAL & SURGICAL HISTORY:  Hypertension  Hyperlipidemia, unspecified hyperlipidemia type  Atrial fibrillation  History of BPH  History of COPD  BRANDIN (obstructive sleep apnea)  S/P appendectomy      Vital Signs Last 24 Hrs  T(C): 36.6 (05 Mar 2022 05:27), Max: 36.8 (04 Mar 2022 09:14)  T(F): 97.8 (05 Mar 2022 05:27), Max: 98.2 (04 Mar 2022 09:14)  HR: 75 (05 Mar 2022 05:27) (50 - 75)  BP: 128/74 (05 Mar 2022 05:27) (97/60 - 140/72)  RR: 16 (05 Mar 2022 05:27) (16 - 18)  SpO2: 97% (05 Mar 2022 05:27) (94% - 98%)    Physical Exam:  Constitutional: NAD, AAOx3  Cardiovascular: +S1S2 RRR  Pulmonary: CTA b/l, unlabored  Abd: soft NTND +BS  Groins: C/D/I bilaterally; no bleeding, hematoma, edema  Extremities: no pedal edema, +distal pulses b/l  Neuro: non focal, WINSLOW x4    LABS:                        12.7   12.37 )-----------( 196      ( 05 Mar 2022 04:42 )             37.2     03-05    135  |  102  |  16.6  ----------------------------<  192<H>  4.2   |  22.0  |  1.02    Ca    8.5<L>      05 Mar 2022 04:42  Mg     2.2     03-05            Assessment:   71 year old male patient with a history of HTN, HLD, COPD, BRANDIN uses mouth piece, and paroxysmal atrial fibrillation unable to tolerate long term anticoagulation due to frequent falls. He is now POD #1 s/p successful uncomplicated CINTIA followed by 21mm Watchman device insertion via right femoral vein.    Plan:  -Limited TTE this AM   -Access site care and activity limitations reviewed w/ pt.   -Outpt f/up in 2-4 weeks with CINTIA in 45days to reassess Watchman.     Pt doing well POD #1 s/p LAAO with Watchman device via RFV. Pt seen and examined this morning, reports feeling well, denies complaints. Morning labs and telemetry reviewed. Right groin suture removed without incident.     EKG: Sinus iris at 55bpm, iRBBB  TELE: SR with episodes of nocturnal SB     MEDICATIONS  (STANDING):  apixaban 5 milliGRAM(s) Oral every 12 hours  aspirin enteric coated 81 milliGRAM(s) Oral daily  atorvastatin 10 milliGRAM(s) Oral at bedtime  cholecalciferol 400 Unit(s) Oral daily  escitalopram 20 milliGRAM(s) Oral daily  fenofibrate Tablet 145 milliGRAM(s) Oral daily  melatonin 1 milliGRAM(s) Oral at bedtime  metoprolol succinate ER 25 milliGRAM(s) Oral daily  omega-3-Acid Ethyl Esters 4 Gram(s) Oral daily  tamsulosin 0.4 milliGRAM(s) Oral at bedtime      Allergies  No Known Allergies      PAST MEDICAL & SURGICAL HISTORY:  Hypertension  Hyperlipidemia, unspecified hyperlipidemia type  Atrial fibrillation  History of BPH  History of COPD  BRANDIN (obstructive sleep apnea)  S/P appendectomy      Vital Signs Last 24 Hrs  T(C): 36.6 (05 Mar 2022 05:27), Max: 36.8 (04 Mar 2022 09:14)  T(F): 97.8 (05 Mar 2022 05:27), Max: 98.2 (04 Mar 2022 09:14)  HR: 75 (05 Mar 2022 05:27) (50 - 75)  BP: 128/74 (05 Mar 2022 05:27) (97/60 - 140/72)  RR: 16 (05 Mar 2022 05:27) (16 - 18)  SpO2: 97% (05 Mar 2022 05:27) (94% - 98%)    Physical Exam:  Constitutional: NAD, AAOx3  Cardiovascular: +S1S2 RRR  Pulmonary: CTA b/l, unlabored  Abd: soft NTND +BS  Groin (right): C/D/I; no bleeding, hematoma, edema  Extremities: no pedal edema, +distal pulses b/l  Neuro: non focal, WINSLOW x4    LABS:                        12.7   12.37 )-----------( 196      ( 05 Mar 2022 04:42 )             37.2     03-05    135  |  102  |  16.6  ----------------------------<  192<H>  4.2   |  22.0  |  1.02    Ca    8.5<L>      05 Mar 2022 04:42  Mg     2.2     03-05            Assessment:   71 year old male patient with a history of HTN, HLD, COPD, BRANDIN uses mouth piece, and paroxysmal atrial fibrillation unable to tolerate long term anticoagulation due to frequent falls. He is now POD #1 s/p successful uncomplicated CINTIA followed by 21mm Watchman device insertion via right femoral vein.    Plan:  -Limited TTE this AM   -Access site care and activity limitations reviewed w/ pt.   -Outpt f/up in 2-4 weeks with CINTIA in 45days to reassess Watchman.

## 2022-03-05 NOTE — DISCHARGE NOTE NURSING/CASE MANAGEMENT/SOCIAL WORK - NSDCPETBCESMAN_GEN_ALL_CORE
Reviewed nurses notes and I agree.    HPI:  Shanita Joy is a 89 year old female who presents today  both daughter and son as they're concerned because she is not gaining weight. She has also been found to be more anxious and depressed lately. She was seen over a month ago and was started on Megace to improve her appetite. She does say that her appetite is much better. After her visit with me she was also seen by her gastroenterologist. She denies any abdominal pain. He did order a CT of the abdomen which was normal. He has advised her to continue the Megace. However today it has been noticed that she didn't lose weight since her last visit. Denies any fever or chills.  She has been noted to be confused off and on.  Denies any dysuria.  She is also currently on prednisone 10 mg daily.    Past Medical History:   Diagnosis Date   • Asthma    • Colitis    • Colon polyp    • HTN (hypertension)    • Hyperlipidemia    • Psoriasis    • Rheumatoid arthritis      Social History     Social History   • Marital status:      Spouse name: N/A   • Number of children: N/A   • Years of education: N/A     Occupational History   • retired RN      Social History Main Topics   • Smoking status: Never Smoker   • Smokeless tobacco: Never Used   • Alcohol use No   • Drug use: No   • Sexual activity: Not on file     Other Topics Concern   • Blood Transfusions Yes     1960   • Caffeine Concern No   • Occupational Exposure No   • Hobby Hazards No   • Sleep Concern Yes     uses trazodone   • Stress Concern No   • Weight Concern No   • Special Diet Yes     low fat diet   • Back Care No   • Exercise Yes     walks   • Seat Belt Yes     Social History Narrative     ROS: SKIN:  denies complaints of :, rashes, bruising  RESPIR:  denies complaint of :, SOB  CV :  denies complaints of :, chest pain or pressure  NEURO:  denies complaints of :, dizziness       Allergy and  Meds reviewed today.      PHYSICAL EXAM   weight is 39 kg. Her blood  pressure is 96/60 and her pulse is 80.  body mass index is 15.73 kg/(m^2).    GENERAL:  Alert oriented well groomed patient in no apparent distress  NECK:  supple and nontender, without significant adenopathy,or thyromegaly  LUNG:  CTA anteriorly and posteriorly, s rales, rhonchi or wheezing  Normal chest wall excursion  COR:  Regular rate and rhythm, normal S1 and S2 s murmurs, gallops or rubs  EXT:  s erythema, edema or cords bilaterally, the skin is warm and s rash, venous stasis changes or petechiae, no significant varicosities  SKIN:  warm, dry skin s rash      ASSESSMENT: (R41.0) Episodic confusion  (primary encounter diagnosis)  Plan: CBC & AUTO DIFFERENTIAL, COMPREHENSIVE         METABOLIC PANEL, URINALYSIS & REFLEX MICRO WITH        CULTURE IF INDICATED        ? May be related to her age versus depression.  Labs ordered to rule out any infection.  Discussed that starting medication to help memory may have side effects. Family and patient chose to wait on this.    (J45.20) Mild intermittent asthma without complication  Comment: Stable  Plan: CBC & AUTO DIFFERENTIAL, COMPREHENSIVE         METABOLIC PANEL, URINALYSIS & REFLEX MICRO WITH        CULTURE IF INDICATED        Continue Singulair 10 mg at bedtime    (R63.4) Weight loss  Plan: CBC & AUTO DIFFERENTIAL, COMPREHENSIVE         METABOLIC PANEL, URINALYSIS & REFLEX MICRO WITH        CULTURE IF INDICATED, SEDIMENTATION RATE         WESTERGREN        Continue the Megace. Recommend ensure supplements twice daily  Discontinue Fosamax and Lescol    (F32.9) Chronic depression  Plan: Increase trazodone to 100 mg nightly    (F41.9) Anxiety  Plan: Continue lorazepam 0.5 MG 3 times daily    Further recommendations will be based on results      There are no Patient Instructions on file for this visit.        Return in about 1 month (around 5/20/2017).    If you are a smoker, it is important for your health to stop smoking. Please be aware that second hand smoke is also harmful.

## 2022-03-05 NOTE — DISCHARGE NOTE NURSING/CASE MANAGEMENT/SOCIAL WORK - PATIENT PORTAL LINK FT
You can access the FollowMyHealth Patient Portal offered by [facility] by registering at the following website: [email or web address]. By joining [email or web address]’s FollowMyHealth portal, you will also be able to view your health information using other applications (apps) compatible with our system.

## 2022-03-05 NOTE — DISCHARGE NOTE NURSING/CASE MANAGEMENT/SOCIAL WORK - NSDCPEFALRISK_GEN_ALL_CORE
For information on Fall & Injury Prevention, visit: [email or web address] OR  [email or web address] OR  https://www.cdc.gov/steadi/patient.html

## 2022-03-07 PROBLEM — G47.33 OBSTRUCTIVE SLEEP APNEA (ADULT) (PEDIATRIC): Chronic | Status: ACTIVE | Noted: 2022-02-17

## 2022-03-25 ENCOUNTER — APPOINTMENT (OUTPATIENT)
Dept: NEUROLOGY | Facility: CLINIC | Age: 72
End: 2022-03-25
Payer: MEDICARE

## 2022-03-25 VITALS
WEIGHT: 220 LBS | DIASTOLIC BLOOD PRESSURE: 70 MMHG | SYSTOLIC BLOOD PRESSURE: 120 MMHG | HEIGHT: 71 IN | BODY MASS INDEX: 30.8 KG/M2

## 2022-03-25 PROCEDURE — 99213 OFFICE O/P EST LOW 20 MIN: CPT

## 2022-03-25 NOTE — PHYSICAL EXAM
[General Appearance - Alert] : alert [General Appearance - In No Acute Distress] : in no acute distress [Oriented To Time, Place, And Person] : oriented to person, place, and time [Affect] : the affect was normal [Memory Remote] : remote memory was not impaired [Total Score ___ / 30] : the patient achieved a score of [unfilled] /30 [Date / Time ___ / 5] : date / time [unfilled] / 5 [Place ___ / 5] : place [unfilled] / 5 [Registration ___ / 3] : registration [unfilled] / 3 [Serial Sevens ___/5] : serial sevens [unfilled] / 5 [Naming 2 Objects ___ / 2] : naming two objects [unfilled] / 2 [Repeating a Sentence ___ / 1] : repeating a sentence [unfilled] / 1 [Writing a Sentence ___ / 1] : write sentence [unfilled] / 1 [3-stage Verbal Command ___ / 3] : three-stage verbal command [unfilled] / 3 [Written Command ___ / 1] : written command [unfilled] / 1 [Copy a Design ___ / 1] : copy a design [unfilled] / 1 [Recall ___ / 3] : recall [unfilled] / 3 [Cranial Nerves Optic (II)] : visual acuity intact bilaterally,  visual fields full to confrontation, pupils equal round and reactive to light [Cranial Nerves Trigeminal (V)] : facial sensation intact symmetrically [Cranial Nerves Oculomotor (III)] : extraocular motion intact [Cranial Nerves Vestibulocochlear (VIII)] : hearing was intact bilaterally [Cranial Nerves Facial (VII)] : face symmetrical [Cranial Nerves Glossopharyngeal (IX)] : tongue and palate midline [Cranial Nerves Accessory (XI - Cranial And Spinal)] : head turning and shoulder shrug symmetric [Cranial Nerves Hypoglossal (XII)] : there was no tongue deviation with protrusion [Motor Tone] : muscle tone was normal in all four extremities [Motor Strength] : muscle strength was normal in all four extremities [Sensation Tactile Decrease] : light touch was intact [Sensation Pain / Temperature Decrease] : pain and temperature was intact [Sensation Vibration Decrease] : vibration was intact [Abnormal Walk] : normal gait [1+] : Ankle jerk left 1+ [Dysarthria] : no dysarthria [Aphasia] : no dysphasia/aphasia [Romberg's Sign] : Romberg's sign was negtive [Coordination - Dysmetria Impaired Finger-to-Nose Bilateral] : not present [Plantar Reflex Right Only] : normal on the right [Plantar Reflex Left Only] : normal on the left

## 2022-03-25 NOTE — HISTORY OF PRESENT ILLNESS
[FreeTextEntry1] : I saw this patient in the office today.\par \par As you recall, he had been seen previously in February of 2018 for some mild memory difficulty.\par Workup at that time had shown a chronic infarct and brain MRI but was otherwise unremarkable.\par I had discussed with the patient and his daughter and we had started Aricept, however, he did not continue it.\par He now reports that his symptoms have not progressed.\par He is currently on antidepressant medication.\par \par He is now referred for a new problem.\par He has been having dizziness and balance difficulty for the past several months.\par He feels it is somewhat better today, however, he does note that it comes and goes.\par He has been using a cane for the past 4 months due to fear of falls.\par \par He now reports that the dizziness has improved although not fully resolved.\par He still has difficulty with balance.

## 2022-03-25 NOTE — ASSESSMENT
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

## 2022-03-25 NOTE — DATA REVIEWED
[de-identified] : Brain MRI was performed on 1/23/18. The study demonstrated ischemic white matter disease as well \par as a tiny lacunar infarct in the left thalamus.\par \par Repeat brain MRI was performed on 1/23/2022.\par The study demonstrated only chronic ischemic changes including the left thalamus. [de-identified] : EEG was normal. [de-identified] : Thyroid function was normal in January of 2018. B12 level was 474.

## 2022-03-28 ENCOUNTER — APPOINTMENT (OUTPATIENT)
Dept: ELECTROPHYSIOLOGY | Facility: CLINIC | Age: 72
End: 2022-03-28
Payer: MEDICARE

## 2022-03-28 ENCOUNTER — NON-APPOINTMENT (OUTPATIENT)
Age: 72
End: 2022-03-28

## 2022-03-28 VITALS
WEIGHT: 220 LBS | TEMPERATURE: 97.7 F | SYSTOLIC BLOOD PRESSURE: 99 MMHG | HEART RATE: 53 BPM | BODY MASS INDEX: 30.8 KG/M2 | DIASTOLIC BLOOD PRESSURE: 61 MMHG | HEIGHT: 71 IN | OXYGEN SATURATION: 93 %

## 2022-03-28 PROCEDURE — 93000 ELECTROCARDIOGRAM COMPLETE: CPT

## 2022-03-28 PROCEDURE — 99214 OFFICE O/P EST MOD 30 MIN: CPT

## 2022-03-28 RX ORDER — FLUTICASONE FUROATE, UMECLIDINIUM BROMIDE AND VILANTEROL TRIFENATATE 100; 62.5; 25 UG/1; UG/1; UG/1
100-62.5-25 POWDER RESPIRATORY (INHALATION)
Refills: 0 | Status: ACTIVE | COMMUNITY

## 2022-03-28 RX ORDER — CHLORHEXIDINE GLUCONATE, 0.12% ORAL RINSE 1.2 MG/ML
0.12 SOLUTION DENTAL
Qty: 473 | Refills: 0 | Status: ACTIVE | COMMUNITY
Start: 2022-03-17

## 2022-03-28 RX ORDER — PSYLLIUM HUSK 0.4 G
CAPSULE ORAL
Refills: 0 | Status: DISCONTINUED | COMMUNITY
End: 2022-03-28

## 2022-03-28 RX ORDER — BUDESONIDE AND FORMOTEROL FUMARATE DIHYDRATE 80; 4.5 UG/1; UG/1
80-4.5 AEROSOL RESPIRATORY (INHALATION) TWICE DAILY
Refills: 0 | Status: DISCONTINUED | COMMUNITY
End: 2022-03-28

## 2022-03-29 NOTE — DISCUSSION/SUMMARY
[FreeTextEntry1] : 71 year old male patient with a history of HTN, HLD, COPD, BRANDIN uses mouth piece, and paroxysmal atrial fibrillation unable to tolerate long term anticoagulation due to frequent falls s/p successful uncomplicated CINTIA followed by 21mm Watchman device implant.\par Presents for post procedure follow-up. Tolerating Eliquis. Denies recent falls or significant bleeding issues on a/c. History of bradycardia.  Prior complaints of dizziness, which has nearly resolved since stopping Ramipril. \par Denies syncope or presyncope. \par \par Prior Event monitor (12/4/21 - 12/17/21) showed sinus rhythm with PAF. No significant bradyarrhythmia or AV block. Episodes of "lightheadedness" c/w sinus bradycardia @ 58bpm.\par \par - Right groin well healed without hematoma, swelling, bleeding or drainage noted\par - Plan for CINTIA > 45 days post WATCHMAN to assess placement.  At that time, if well seated device is noted, will stop a/c and start ASA/ Plavix. \par - After 6 months post implant, can continue single antiplatelet therapy alone\par - Regarding h/o bradycardia - patient remains asymptomatic and denies significant dizziness since stopping Ramipril. Recommended repeat monitoring, however, patient declined.  Consider repeat monitoring if symptoms develop and patient agreeable.\par - Continue cardiology f/up with Dr. Paulson\par \par Seen and d/w Dr. Franklin\par Amy Leblanc PAC\par

## 2022-03-29 NOTE — HISTORY OF PRESENT ILLNESS
[FreeTextEntry1] : 71 year old male patient with a history of HTN, HLD, COPD, BRANDIN uses mouth piece, and paroxysmal atrial fibrillation unable to tolerate long term anticoagulation due to frequent falls s/p successful uncomplicated CINTIA followed by 21mm Watchman device implant.\par Presents for post procedure follow-up. Tolerating Eliquis. Denies recent falls or significant bleeding issues on a/c. History of bradycardia.  Prior complaints of dizziness, which has nearly resolved since stopping Ramipril. \par Denies syncope or presyncope. \par \par Prior Event monitor (12/4/21 - 12/17/21) showed sinus rhythm with PAF. No significant bradyarrhythmia or AV block. Episodes of "lightheadedness" c/w sinus bradycardia @ 58bpm.\par \par

## 2022-03-29 NOTE — PHYSICAL EXAM
[Well Developed] : well developed [No Acute Distress] : no acute distress [No Murmur] : no murmur [No Respiratory Distress] : no respiratory distress  [Normal Gait] : normal gait [No Edema] : no edema [Moves all extremities] : moves all extremities [Alert and Oriented] : alert and oriented [de-identified] : right groin well healed without hematoma, swelling, bleeding or drainage noted

## 2022-03-30 NOTE — ED ADULT NURSE NOTE - NS ED NOTE ABUSE RESPONSE YN
Revlimid 10 mg chemo capsules reordered and sent to [facility] [address]. Robyn Jonna # [id number] obtained through [employer] risk management/REMS portal. Auth valid for 30 days. Yes

## 2022-04-12 ENCOUNTER — APPOINTMENT (OUTPATIENT)
Dept: PULMONOLOGY | Facility: CLINIC | Age: 72
End: 2022-04-12

## 2022-04-27 ENCOUNTER — OUTPATIENT (OUTPATIENT)
Dept: OUTPATIENT SERVICES | Facility: HOSPITAL | Age: 72
LOS: 1 days | End: 2022-04-27
Payer: MEDICARE

## 2022-04-27 ENCOUNTER — TRANSCRIPTION ENCOUNTER (OUTPATIENT)
Age: 72
End: 2022-04-27

## 2022-04-27 VITALS
SYSTOLIC BLOOD PRESSURE: 158 MMHG | WEIGHT: 220.46 LBS | RESPIRATION RATE: 17 BRPM | DIASTOLIC BLOOD PRESSURE: 74 MMHG | OXYGEN SATURATION: 97 % | TEMPERATURE: 98 F | HEIGHT: 70 IN | HEART RATE: 54 BPM

## 2022-04-27 DIAGNOSIS — I48.0 PAROXYSMAL ATRIAL FIBRILLATION: ICD-10-CM

## 2022-04-27 DIAGNOSIS — Z90.49 ACQUIRED ABSENCE OF OTHER SPECIFIED PARTS OF DIGESTIVE TRACT: Chronic | ICD-10-CM

## 2022-04-27 LAB
ANION GAP SERPL CALC-SCNC: 13 MMOL/L — SIGNIFICANT CHANGE UP (ref 5–17)
BUN SERPL-MCNC: 12.2 MG/DL — SIGNIFICANT CHANGE UP (ref 8–20)
CALCIUM SERPL-MCNC: 9.1 MG/DL — SIGNIFICANT CHANGE UP (ref 8.6–10.2)
CHLORIDE SERPL-SCNC: 101 MMOL/L — SIGNIFICANT CHANGE UP (ref 98–107)
CO2 SERPL-SCNC: 22 MMOL/L — SIGNIFICANT CHANGE UP (ref 22–29)
CREAT SERPL-MCNC: 0.87 MG/DL — SIGNIFICANT CHANGE UP (ref 0.5–1.3)
EGFR: 91 ML/MIN/1.73M2 — SIGNIFICANT CHANGE UP
GLUCOSE SERPL-MCNC: 132 MG/DL — HIGH (ref 70–99)
HCT VFR BLD CALC: 42.7 % — SIGNIFICANT CHANGE UP (ref 39–50)
HGB BLD-MCNC: 14.4 G/DL — SIGNIFICANT CHANGE UP (ref 13–17)
INR BLD: 1.56 RATIO — HIGH (ref 0.88–1.16)
MCHC RBC-ENTMCNC: 32.1 PG — SIGNIFICANT CHANGE UP (ref 27–34)
MCHC RBC-ENTMCNC: 33.7 GM/DL — SIGNIFICANT CHANGE UP (ref 32–36)
MCV RBC AUTO: 95.3 FL — SIGNIFICANT CHANGE UP (ref 80–100)
PLATELET # BLD AUTO: 187 K/UL — SIGNIFICANT CHANGE UP (ref 150–400)
POTASSIUM SERPL-MCNC: 4.5 MMOL/L — SIGNIFICANT CHANGE UP (ref 3.5–5.3)
POTASSIUM SERPL-SCNC: 4.5 MMOL/L — SIGNIFICANT CHANGE UP (ref 3.5–5.3)
PROTHROM AB SERPL-ACNC: 18.2 SEC — HIGH (ref 10.5–13.4)
RBC # BLD: 4.48 M/UL — SIGNIFICANT CHANGE UP (ref 4.2–5.8)
RBC # FLD: 13.1 % — SIGNIFICANT CHANGE UP (ref 10.3–14.5)
SODIUM SERPL-SCNC: 136 MMOL/L — SIGNIFICANT CHANGE UP (ref 135–145)
WBC # BLD: 9 K/UL — SIGNIFICANT CHANGE UP (ref 3.8–10.5)
WBC # FLD AUTO: 9 K/UL — SIGNIFICANT CHANGE UP (ref 3.8–10.5)

## 2022-04-27 PROCEDURE — 93312 ECHO TRANSESOPHAGEAL: CPT

## 2022-04-27 PROCEDURE — 93325 DOPPLER ECHO COLOR FLOW MAPG: CPT | Mod: 26

## 2022-04-27 PROCEDURE — 85610 PROTHROMBIN TIME: CPT

## 2022-04-27 PROCEDURE — 93325 DOPPLER ECHO COLOR FLOW MAPG: CPT

## 2022-04-27 PROCEDURE — 93005 ELECTROCARDIOGRAM TRACING: CPT

## 2022-04-27 PROCEDURE — 93320 DOPPLER ECHO COMPLETE: CPT

## 2022-04-27 PROCEDURE — 93320 DOPPLER ECHO COMPLETE: CPT | Mod: 26

## 2022-04-27 PROCEDURE — 93312 ECHO TRANSESOPHAGEAL: CPT | Mod: 26

## 2022-04-27 PROCEDURE — 85027 COMPLETE CBC AUTOMATED: CPT

## 2022-04-27 PROCEDURE — 93010 ELECTROCARDIOGRAM REPORT: CPT

## 2022-04-27 PROCEDURE — 36415 COLL VENOUS BLD VENIPUNCTURE: CPT

## 2022-04-27 PROCEDURE — 80048 BASIC METABOLIC PNL TOTAL CA: CPT

## 2022-04-27 RX ORDER — APIXABAN 2.5 MG/1
1 TABLET, FILM COATED ORAL
Qty: 0 | Refills: 0 | DISCHARGE

## 2022-04-27 RX ORDER — CLOPIDOGREL BISULFATE 75 MG/1
1 TABLET, FILM COATED ORAL
Qty: 30 | Refills: 3
Start: 2022-04-27 | End: 2022-08-24

## 2022-04-27 NOTE — DISCHARGE NOTE PROVIDER - NSDCFUSCHEDAPPT_GEN_ALL_CORE_FT
VICTOR MANUEL PEÑA ; 05/11/2022 ; NPP Cardio [address] [facility] Dorcas Paulson  [facility] Physician [facility]  [address]  Scheduled Appointment: 05/11/2022

## 2022-04-27 NOTE — DISCHARGE NOTE PROVIDER - NSDCFUADDINST_GEN_ALL_CORE_FT
Follow up with Dr. Franklin as an outpatient.  Follow up with Dr. Franklin in 3-4 months, or sooner if needed. Our office will contact you in 3-5 days to schedule this appointment. Please call your doctor with any questions or concerns.

## 2022-04-27 NOTE — PROGRESS NOTE ADULT - SUBJECTIVE AND OBJECTIVE BOX
Pt doing well s/p uncomplicated 45 day post Watchman CINTIA. Sleepy from anesthesia but denies complaint post procedure.     PAST MEDICAL & SURGICAL HISTORY:  Hypertension  Hyperlipidemia, unspecified hyperlipidemia type  Chronic atrial fibrillation  History of BPH  History of COPD  BRANDIN (obstructive sleep apnea)  S/P appendectomy  s/p Watchman device implant     Post-procedure VS: /78 HR 58 O2 sat 98% RR 16   awake, alert, no obvious distress  Card: S1/S2, RRR, no m/g/r  Resp: lungs CTA b/l  Abd: S/NT/ND  Ext: no edema, distal pulses intact    Assessment:   71 year old male patient with PMH of HTN, HLD, COPD, BRANDIN uses mouth piece, and paroxysmal atrial fibrillation unable to tolerate long term anticoagulation due to frequent falls. He underwent LAAO with Watchman device implant on 3/4/22. He presented today for 45 day post Watchman CINTIA, which demonstrated no leaks or device related thrombus. Pt will transition off Eliquis and start aspirin/plavix regimen.     Plan:   Observation on telemetry per post op protocol.    Resume PO intake.   Ambulate w/ assist once fully awake & back to baseline mental status w/ VSS.  Discontinue Eliquis.   Start aspirin 81mg and Plavix 75mg daily.   Resume other home medications.   Anticipate d/c home once all criteria met, with outpt f/up in 4 months.

## 2022-04-27 NOTE — DISCHARGE NOTE NURSING/CASE MANAGEMENT/SOCIAL WORK - PATIENT PORTAL LINK FT
You can access the FollowMyHealth Patient Portal offered by [facility] by registering at the following website: [email or web address]. By joining [employer]’s FollowMyHealth portal, you will also be able to view your health information using other applications (apps) compatible with our system.

## 2022-04-27 NOTE — DISCHARGE NOTE PROVIDER - CARE PROVIDERS DIRECT ADDRESSES
,DirectAddress_Unknown ,DirectAddress_Unknown,[email or web address]

## 2022-04-27 NOTE — H&P PST ADULT - IS PATIENT PREGNANT?
not applicable (Male)
Mother  Still living? Unknown  Family history of colon cancer, Age at diagnosis: Age Unknown  Family history of uterine cancer, Age at diagnosis: Age Unknown

## 2022-04-27 NOTE — DISCHARGE NOTE PROVIDER - HOSPITAL COURSE
71 year old male patient with a history of HTN, HLD, COPD, BRANDIN uses mouth piece, and paroxysmal atrial fibrillation unable to tolerate long term anticoagulation due to frequent falls. He is now s/p successful uncomplicated CINTIA followed by 21mm Watchman device insertion via right femoral vein on 3/4/22.   CINTIA reveals: 71 year old male patient with PMH of HTN, HLD, COPD, BRANDIN uses mouth piece, and paroxysmal atrial fibrillation unable to tolerate long term anticoagulation due to frequent falls. He underwent LAAO with Watchman device implant on 3/4/22. He presented today for 45 day post Watchman CINTIA, which demonstrated no leaks or device related thrombus. Pt will transition off Eliquis and start aspirin/plavix regimen. The patient was observed per post procedure protocol, then discharged home with a plan for outpatient follow up.

## 2022-04-27 NOTE — DISCHARGE NOTE PROVIDER - NSDCMRMEDTOKEN_GEN_ALL_CORE_FT
Aspir 81 oral delayed release tablet: 1 tab(s) orally once a day  atorvastatin 10 mg oral tablet: 1 tab(s) orally once a day  Eliquis 5 mg oral tablet: 1 tab(s) orally 2 times a day  escitalopram 20 mg oral tablet: 1 tab(s) orally once a day  fenofibrate 160 mg oral tablet: 1 tab(s) orally once a day  Flomax 0.4 mg oral capsule: 1 cap(s) orally once a day (at bedtime)  melatonin 1 mg oral tablet: 2 tab(s) orally once a day (at bedtime)  omega-3 polyunsaturated fatty acids ethyl esters 1000 mg oral capsule: 4 cap(s) orally once a day  terazosin 5 mg oral capsule: 1 cap(s) orally once a day (at bedtime)  Toprol-XL 25 mg oral tablet, extended release: 1 tab(s) orally once a day   Trelegy Ellipta 100 mcg-62.5 mcg-25 mcg/inh inhalation powder: 1 puff(s) inhaled once a day  Vitamin D3 400 intl units (10 mcg) oral tablet: 1 tab(s) orally once a day   Aspir 81 oral delayed release tablet: 1 tab(s) orally once a day  atorvastatin 10 mg oral tablet: 1 tab(s) orally once a day  Eliquis 5 mg oral tablet: 1 tab(s) orally 2 times a day  escitalopram 20 mg oral tablet: 1 tab(s) orally once a day  fenofibrate 160 mg oral tablet: 1 tab(s) orally once a day  Flomax 0.4 mg oral capsule: 1 cap(s) orally once a day (at bedtime)  melatonin 1 mg oral tablet: 2 tab(s) orally once a day (at bedtime)  omega-3 polyunsaturated fatty acids ethyl esters 1000 mg oral capsule: 4 cap(s) orally once a day  ramipril 10 mg oral capsule: 1 cap(s) orally once a day  terazosin 5 mg oral capsule: 1 cap(s) orally once a day (at bedtime)  Toprol-XL 25 mg oral tablet, extended release: 1 tab(s) orally once a day   Trelegy Ellipta 100 mcg-62.5 mcg-25 mcg/inh inhalation powder: 1 puff(s) inhaled once a day  Vitamin D3 400 intl units (10 mcg) oral tablet: 1 tab(s) orally once a day   Aspir 81 oral delayed release tablet: 1 tab(s) orally once a day  atorvastatin 10 mg oral tablet: 1 tab(s) orally once a day  clopidogrel 75 mg oral tablet: 1 tab(s) orally once a day   escitalopram 20 mg oral tablet: 1 tab(s) orally once a day  fenofibrate 160 mg oral tablet: 1 tab(s) orally once a day  Flomax 0.4 mg oral capsule: 1 cap(s) orally once a day (at bedtime)  melatonin 1 mg oral tablet: 2 tab(s) orally once a day (at bedtime)  omega-3 polyunsaturated fatty acids ethyl esters 1000 mg oral capsule: 4 cap(s) orally once a day  ramipril 10 mg oral capsule: 1 cap(s) orally once a day  terazosin 5 mg oral capsule: 1 cap(s) orally once a day (at bedtime)  Toprol-XL 25 mg oral tablet, extended release: 1 tab(s) orally once a day   Trelegy Ellipta 100 mcg-62.5 mcg-25 mcg/inh inhalation powder: 1 puff(s) inhaled once a day  Vitamin D3 400 intl units (10 mcg) oral tablet: 1 tab(s) orally once a day

## 2022-04-27 NOTE — H&P PST ADULT - ASSESSMENT
71 year old male patient with a history of HTN, HLD, COPD, BRANDIN uses mouth piece, and paroxysmal atrial fibrillation unable to tolerate long term anticoagulation due to frequent falls and now s/p Watchman device insertion who presents for 45 day post Watchman CINTIA    - Arrived in fasting state  - Took Eliquis this AM  - COVID negative on 4/24/22

## 2022-04-27 NOTE — DISCHARGE NOTE PROVIDER - PROVIDER TOKENS
PROVIDER:[TOKEN:[[id number]],FOLLOWUP:[Routine],ESTABLISHEDPATIENT:[T]] PROVIDER:[TOKEN:[[id number]],FOLLOWUP:[Routine],ESTABLISHEDPATIENT:[T]],PROVIDER:[TOKEN:[[id number]],FOLLOWUP:[Routine],ESTABLISHEDPATIENT:[T]]

## 2022-04-27 NOTE — DISCHARGE NOTE PROVIDER - CARE PROVIDER_API CALL
Marly Franklin)  Cardiac Electrophysiology; Cardiovascular Disease; Internal Medicine  [address] [place] [address]  Phone: [phone]  Fax: [phone]  Established Patient  Follow Up Time: Routine   Marly Franklin)  Cardiac Electrophysiology; Cardiovascular Disease; Internal Medicine  [address] [place] [address]  Phone: [phone]  Fax: [phone]  Established Patient  Follow Up Time: Routine    Dorcas Paulson)  Cardiology; Internal Medicine  [address] [facility], [address]  [place], [place] [address]  Phone: [phone]  Fax: [phone]  Established Patient  Follow Up Time: Routine

## 2022-04-27 NOTE — DISCHARGE NOTE NURSING/CASE MANAGEMENT/SOCIAL WORK - NSDCPEFALRISK_GEN_ALL_CORE
For information on Fall & Injury Prevention, visit: [email or web address] OR  [email or web address] OR  https://www.cdc.gov/steadi/patient.html

## 2022-04-27 NOTE — H&P PST ADULT - HISTORY OF PRESENT ILLNESS
71 year old male patient with a history of HTN, HLD, COPD, BRANDIN uses mouth piece, and paroxysmal atrial fibrillation unable to tolerate long term anticoagulation due to frequent falls. He is now s/p successful uncomplicated CINTIA followed by 21mm Watchman device insertion via right femoral vein on 3/4/22. Patient presents for 45 day post Watchman CINTIA. He denies any acute complaints.     Cardiac Summary:   Echocardiogram 2/21 LVH, normal EF no significant valvular disease  Stress Test 2021: normal EF and normal perfusion   Cardiac Cath: 2/17/22 No obstructive coronary artery disease  requiring intervention prior to Watchman procedure

## 2022-04-27 NOTE — DISCHARGE NOTE PROVIDER - NSDCCPTREATMENT_GEN_ALL_CORE_FT
PRINCIPAL PROCEDURE  Procedure: Transesophageal echocardiography (CINTIA) with interpretation and report  Findings and Treatment:        PRINCIPAL PROCEDURE  Procedure: Transesophageal echocardiogram  Findings and Treatment: Notify your doctor if you develop a fever, cough up blood, have any chest pain, palpitations or difficulty breathing. You may take a throat lozenge if you develop a sore throat or try warm salt water gargle. Resume your diet with soft foods first. Follow up with your cardiologist within 2 weeks for a follow up or sooner with any concerns. Report to the nearest ER with any emergencies.

## 2022-04-28 RX ORDER — CLOPIDOGREL BISULFATE 75 MG/1
75 TABLET, FILM COATED ORAL
Qty: 90 | Refills: 0 | Status: ACTIVE | COMMUNITY
Start: 2022-04-28

## 2022-04-28 RX ORDER — APIXABAN 5 MG/1
5 TABLET, FILM COATED ORAL
Qty: 90 | Refills: 3 | Status: DISCONTINUED | COMMUNITY
Start: 2021-01-13 | End: 2022-04-28

## 2022-05-10 DIAGNOSIS — Z92.89 PERSONAL HISTORY OF OTHER MEDICAL TREATMENT: ICD-10-CM

## 2022-05-10 DIAGNOSIS — J44.9 OBSTRUCTIVE SLEEP APNEA (ADULT) (PEDIATRIC): ICD-10-CM

## 2022-05-10 DIAGNOSIS — I48.0 PAROXYSMAL ATRIAL FIBRILLATION: ICD-10-CM

## 2022-05-10 DIAGNOSIS — G47.33 OBSTRUCTIVE SLEEP APNEA (ADULT) (PEDIATRIC): ICD-10-CM

## 2022-05-10 RX ORDER — LIDOCAINE HCL 4 %
400 CREAM (GRAM) TOPICAL
Refills: 0 | Status: ACTIVE | COMMUNITY

## 2022-05-11 ENCOUNTER — NON-APPOINTMENT (OUTPATIENT)
Age: 72
End: 2022-05-11

## 2022-05-11 ENCOUNTER — APPOINTMENT (OUTPATIENT)
Dept: CARDIOLOGY | Facility: CLINIC | Age: 72
End: 2022-05-11
Payer: MEDICARE

## 2022-05-11 VITALS
TEMPERATURE: 98.4 F | HEIGHT: 71 IN | HEART RATE: 65 BPM | OXYGEN SATURATION: 95 % | SYSTOLIC BLOOD PRESSURE: 112 MMHG | DIASTOLIC BLOOD PRESSURE: 74 MMHG

## 2022-05-11 VITALS — SYSTOLIC BLOOD PRESSURE: 118 MMHG | OXYGEN SATURATION: 93 % | HEART RATE: 65 BPM | DIASTOLIC BLOOD PRESSURE: 60 MMHG

## 2022-05-11 VITALS — DIASTOLIC BLOOD PRESSURE: 68 MMHG | OXYGEN SATURATION: 94 % | HEART RATE: 62 BPM | SYSTOLIC BLOOD PRESSURE: 116 MMHG

## 2022-05-11 VITALS — DIASTOLIC BLOOD PRESSURE: 67 MMHG | SYSTOLIC BLOOD PRESSURE: 112 MMHG

## 2022-05-11 VITALS — OXYGEN SATURATION: 93 % | HEART RATE: 61 BPM | DIASTOLIC BLOOD PRESSURE: 70 MMHG | SYSTOLIC BLOOD PRESSURE: 140 MMHG

## 2022-05-11 PROCEDURE — 99215 OFFICE O/P EST HI 40 MIN: CPT

## 2022-05-11 PROCEDURE — 93000 ELECTROCARDIOGRAM COMPLETE: CPT

## 2022-05-11 RX ORDER — RAMIPRIL 10 MG/1
10 CAPSULE ORAL
Qty: 90 | Refills: 3 | Status: DISCONTINUED | COMMUNITY
End: 2022-05-11

## 2022-05-11 NOTE — PHYSICAL EXAM
[General Appearance - Well Developed] : well developed [Normal Appearance] : normal appearance [Well Groomed] : well groomed [General Appearance - Well Nourished] : well nourished [No Deformities] : no deformities [General Appearance - In No Acute Distress] : no acute distress [Normal Conjunctiva] : the conjunctiva exhibited no abnormalities [Eyelids - No Xanthelasma] : the eyelids demonstrated no xanthelasmas [Normal Oral Mucosa] : normal oral mucosa [No Oral Pallor] : no oral pallor [No Oral Cyanosis] : no oral cyanosis [Normal Jugular Venous A Waves Present] : normal jugular venous A waves present [Normal Jugular Venous V Waves Present] : normal jugular venous V waves present [No Jugular Venous Smith A Waves] : no jugular venous smith A waves [Heart Rate And Rhythm] : heart rate and rhythm were normal [Heart Sounds] : normal S1 and S2 [Murmurs] : no murmurs present [Respiration, Rhythm And Depth] : normal respiratory rhythm and effort [Exaggerated Use Of Accessory Muscles For Inspiration] : no accessory muscle use [Auscultation Breath Sounds / Voice Sounds] : lungs were clear to auscultation bilaterally [Abdomen Soft] : soft [Abdomen Tenderness] : non-tender [Abdomen Mass (___ Cm)] : no abdominal mass palpated [Nail Clubbing] : no clubbing of the fingernails [Cyanosis, Localized] : no localized cyanosis [Petechial Hemorrhages (___cm)] : no petechial hemorrhages [Skin Color & Pigmentation] : normal skin color and pigmentation [] : no rash [No Venous Stasis] : no venous stasis [No Skin Ulcers] : no skin ulcer [Skin Lesions] : no skin lesions [No Xanthoma] : no  xanthoma was observed [Oriented To Time, Place, And Person] : oriented to person, place, and time [Affect] : the affect was normal [Mood] : the mood was normal [No Anxiety] : not feeling anxious

## 2022-05-12 NOTE — REASON FOR VISIT
[Initial Evaluation] : an initial evaluation of [FreeTextEntry1] : Abnormal EKG , chest pain , atrial fibrillation  [FreeTextEntry2] : Abnormal EKG , chest pain , atrial fibrillation

## 2022-05-12 NOTE — CARDIOLOGY SUMMARY
[___] : [unfilled] [de-identified] : 5 11 2022: Sinus :  Sinus  Rhythm \par WITHIN NORMAL LIMITS\par \par 11 30 2021  Sinus  Bradycardia \par WITHIN NORMAL LIMITS\par \par \par 5/25/2021   [de-identified] : 17 dec 2021:  2 marsha kothari event monito.r No significant events on monitor. 2 [de-identified] : 2/29/2021:  Nuclear stress test: No ischemia. raz nuclear.  LVEf 72%.  [de-identified] : Mayr 2022:  Normal LVEF 60%. \par feb 2021:  mild LVH.  grade I    diastolic dysfunction  normal Rv.  no significant valvular abnormality  [de-identified] : cardiac Ct scan: mar 2022 Total CAACV: 917 .  Left main: 13.  LAD : 452.  Lcx: 157l;  . RI : 645.  Toatl 917. \par heavilcy caclifcied: LCx and possile stenosis.  [de-identified] : cardiac cath : mar 2022:  [de-identified] : cartoid Duplex:  2017: smal calcified  left ICA

## 2022-05-12 NOTE — HISTORY OF PRESENT ILLNESS
[FreeTextEntry1] : Abnormal EKG, atrial fibrillation \par \par \par HPI for today:    feels good. got watchman done. during wathcmna CT scan showed significant coroanry calficiations. he got a cardaic cath done. no significant stenosis\par got watchman done.  a\par \par  \par old noteL: no dizziness. no syncope. complaitn with meds.  no new worsening dyspnea on exertion .  stable chronic dyspnea on exertion .\par he was asked to stopped ramipril. \par \par old note: : lightheadedness 2 times a day.  wuite often. not every time he gets up. sometimes when he is not getting up and he feels lightheadedness.\par bno weakness of arms. \par balance if off.  he feels ataxic.\par \par \par old note:  compliant with meds.  no bleeding episodes. no headaches.\par occasional  dizziness. no syncope. \par dizziness especially when he gets up to o fast.  no syncope. \par \par \par old note: This is a 70 year old male with history of hypertension and dyslipidemia , prior smoking, quit 20 year s agom, alcohol abuse, here with chest pain and dyspnea on exertion and new onset afib. \par \par chest pain.  Onset: months.   Type: Pressure ; Duration: intermittent days. intensity: 1/10, lasted for : couple of hours.;   Radiation:  none  Associated symptoms: + Dyspnea. \par No chest paion on walking. on going up stairs. Dyspnea not gettging worse. PMD taking care of that.\par alcohol use : 6 canes every day for last few years.\par \par + palpitations. irrengular heart beat.  + dizziness.\par + snoring.  he wakes up 2-3 time a night. going to bathroom.   he takes naps during day time. day time sleepiness. \par \par

## 2022-05-12 NOTE — DISCUSSION/SUMMARY
[Patient] : the patient [Risks] : risks [Benefits] : benefits [Alternatives] : alternatives [With Me] : with me [___ Month(s)] : in [unfilled] month(s) [FreeTextEntry1] : This is a 70 year old male with history of hypertension and dyslipidemia , alcohol abuse, prior smoking, here with day time sleepiness, and chest pain , and dyspnea on exertion , \par \par 1)   dizziness  . : check orthostatics:  off ramip[ril. BP improved . taking terazosin and tamsulosin. ramu switch to night time. compression stockigns. .  discuss with urologist to reduce meds if possible. \par 2 Atrial fibrillation : paroxysmal.  in sinus now. CHADSVASC : 2  s/ watchman :   ct toprol 50 mg daily\par 3) chest pain and dyspnea on exertion :  resolved. recent cardaic cath non obstructive. . \par 4) hypertension : off ramipril.  \par 5) dyslipidemia : ct fenofirbate, and ct statins. \par 6) Smoking:  AAA screening.  > 50 packe year history. : no aneurysm. \par 7) carotid plaque plaque: aspirin . statins. \par Will order and review ECG for the above mentioned diagnosis/condition/symptoms \par az\par

## 2022-08-29 ENCOUNTER — NON-APPOINTMENT (OUTPATIENT)
Age: 72
End: 2022-08-29

## 2022-08-29 ENCOUNTER — APPOINTMENT (OUTPATIENT)
Dept: ELECTROPHYSIOLOGY | Facility: CLINIC | Age: 72
End: 2022-08-29

## 2022-08-29 VITALS
BODY MASS INDEX: 29.99 KG/M2 | WEIGHT: 215 LBS | RESPIRATION RATE: 16 BRPM | SYSTOLIC BLOOD PRESSURE: 114 MMHG | DIASTOLIC BLOOD PRESSURE: 71 MMHG | HEART RATE: 62 BPM | OXYGEN SATURATION: 94 % | TEMPERATURE: 99.3 F

## 2022-08-29 DIAGNOSIS — R42 DIZZINESS AND GIDDINESS: ICD-10-CM

## 2022-08-29 DIAGNOSIS — F41.8 OTHER SPECIFIED ANXIETY DISORDERS: ICD-10-CM

## 2022-08-29 DIAGNOSIS — R00.1 BRADYCARDIA, UNSPECIFIED: ICD-10-CM

## 2022-08-29 DIAGNOSIS — Z95.818 PRESENCE OF OTHER CARDIAC IMPLANTS AND GRAFTS: ICD-10-CM

## 2022-08-29 PROCEDURE — 99214 OFFICE O/P EST MOD 30 MIN: CPT

## 2022-08-29 PROCEDURE — 93000 ELECTROCARDIOGRAM COMPLETE: CPT

## 2022-08-29 RX ORDER — MECLIZINE HYDROCHLORIDE 25 MG/1
25 TABLET ORAL 3 TIMES DAILY
Refills: 0 | Status: ACTIVE | COMMUNITY
Start: 2022-08-29

## 2022-08-29 RX ORDER — METOPROLOL TARTRATE 25 MG/1
25 TABLET, FILM COATED ORAL DAILY
Refills: 0 | Status: ACTIVE | COMMUNITY
Start: 2022-08-29

## 2022-08-29 RX ORDER — GUAIFENESIN/PHENYLPROPANOLAMIN
500 EXPECTORANT ORAL
Refills: 0 | Status: ACTIVE | COMMUNITY
Start: 2022-08-29

## 2022-08-29 RX ORDER — MULTIVIT-MIN/FOLIC/VIT K/LYCOP 400-300MCG
1000 TABLET ORAL DAILY
Refills: 0 | Status: ACTIVE | COMMUNITY
Start: 2022-08-29

## 2022-08-29 RX ORDER — ESCITALOPRAM OXALATE 20 MG/1
20 TABLET ORAL DAILY
Refills: 0 | Status: ACTIVE | COMMUNITY
Start: 2022-08-29

## 2022-08-30 ENCOUNTER — APPOINTMENT (OUTPATIENT)
Dept: CARDIOLOGY | Facility: CLINIC | Age: 72
End: 2022-08-30

## 2022-08-30 NOTE — DISCUSSION/SUMMARY
[EKG obtained to assist in diagnosis and management of assessed problem(s)] : EKG obtained to assist in diagnosis and management of assessed problem(s) [FreeTextEntry1] : 71 year old male patient with a history of HTN, HLD, COPD, BRANDIN uses mouth piece, and paroxysmal atrial fibrillation unable to tolerate long term anticoagulation due to frequent falls s/p successful uncomplicated CINTIA followed by 21mm Watchman device implant (3/4/22). Tolerated Eliquis post WATCHMAN. Underwent 45 day CINTIA (4/27/22) which showed no leak or thrombus. Eliquis stopped and started on DAPT with ASA and Plavix.\par \par Now approximately 6 months post WATCHMAN and remains on DAPT without bleeding issues. Recent complaints of intermittent dizziness, mostly upon standing, and frequent falls. Denies trauma. Patient is a poor historian but reports one episode of possible presyncope.  Where he got dizziness and "had to hold the chair and catch myself". Had prior complaints of dizziness, which improved after stopping Ramipril. Denies syncope.\par Metoprolol was recently lowered to Toprol 25mg dialy (from 50mg) with symptomatic improvement.\par \par Prior Event monitor (12/4/21 - 12/17/21) showed sinus rhythm with PAF. No significant bradyarrhythmia or AV block. Episodes of "lightheadedness" c/w sinus bradycardia @ 58bpm.\par \par - Now ~ 6 months post WATCHMAN.  Can d/w Plavix and continue ASA long term. One year f/up CINTIA to r/o DRT\par - Ongoing complaints of intermittent dizziness and recurrent falls. Symptoms of dizziness have improved on lower dose BB.\par - EKG shows SR @ 60bpm, narrow QRS. Prior monitor showed symptoms c/w SB @ 58bpm\par - Recommended repeat monitoring, to provide symptom correlation.  \par - If monitor unremarkable - will arrange EP f/up in 7 months after one year f/.up CINTIA post watchman. repeat monitor prior to visit \par \par Seen and d/w Dr. Franklin\par Amy Leblanc PAC\par

## 2022-08-30 NOTE — HISTORY OF PRESENT ILLNESS
[FreeTextEntry1] : 71 year old male patient with a history of HTN, HLD, COPD, BRANDIN uses mouth piece, and paroxysmal atrial fibrillation unable to tolerate long term anticoagulation due to frequent falls s/p successful uncomplicated CINTIA followed by 21mm Watchman device implant (3/4/22). Tolerated Eliquis post WATCHMAN. Underwent 45 day CINTIA (4/27/22) which showed no leak or thrombus. Eliquis stopped and started on DAPT with ASA and Plavix.\par \par Now approximately 6 months post WATCHMAN and remains on DAPT without bleeding issues. Recent complaints of intermittent dizziness, mostly upon standing, and frequent falls. Denies trauma. Patient is a poor historian but reports one episode of possible presyncope.  Where he got dizziness and "had to hold the chair and catch myself". Had prior complaints of dizziness, which improved after stopping Ramipril. Denies syncope.\par Metoprolol was recently lowered to Toprol 25mg dialy (from 50mg) with symptomatic improvement.\par \par Prior Event monitor (12/4/21 - 12/17/21) showed sinus rhythm with PAF. No significant bradyarrhythmia or AV block. Episodes of "lightheadedness" c/w sinus bradycardia @ 58bpm.\par \par

## 2022-09-26 ENCOUNTER — APPOINTMENT (OUTPATIENT)
Dept: NEUROLOGY | Facility: CLINIC | Age: 72
End: 2022-09-26

## 2022-10-25 NOTE — H&P PST ADULT - OPHTHALMOLOGIC
Patient reports abd distension, appears discolored, tender to touch, "for a while."  Patient states "I have stitches from my surgery."  Also c/o "bad chest pain."  EKG done in triage.
negative

## 2023-03-03 NOTE — ED PROVIDER NOTE - CARE PLAN
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 room if not feeling safe , unable to function in the community, or if suicidal, homicidal or hearing voices orhaving paranoia.  4.Patient will abstain from drugs and alcohol.  5.Patient will not drive if sedated on medications or under influence of any substance. 6.Patient will not mix psychiatric medications with drugs andalcohol.   7.Patient will watch his diet and exercise.  8.Patient will see non psychiatric providers for non psychiatric disorders.      Risk Assessment:     Deanne has notable risk factors for self-harm, including, single status, anxiety and passive SI. However, risk is mitigated by ability to volunteer a safety plan and history of seeking help when needed. Additional steps taken to minimize risk include making medication adjustment, asking patient to call 911 and go to the ER if not able to stay safe at home,  Therefore, based on all available evidence including the factors cited above, Deanne does not appear to be an imminent danger to self or others and does not meet criteria 72 hour hold. However, if patient uses substances or is non-adherent with medication, their risk of decompensation and SI/HI will be elevated. This was discussed with the patient as she  verbalized good understanding.   CONSULTS/REFERRALS:   Recommend therapy. Referral placed for [facility].  Call [facility] at [phone] if you do not hear from them soon  Coordinate care with therapist as needed    MEDICAL:   None at this time  Coordinate care with PCP (Crista Elder) as needed  Follow up with primary care provider as planned or for acute medical concerns.    PSYCHOEDUCATION:  Medication side effects and alternatives reviewed. Health promotion activities recommended and reviewed today. All questions addressed. Education and counseling completed regarding risks and benefits of medications and psychotherapy options.  Consent provided by patient/guardian  Call the psychiatric nurse line with medication questions or concerns at [phone].  Tubular Labshart may be used to communicate with your provider, but this is not intended to be used for emergencies.  BLACK BOX WARNING: Discussed the Food and Drug Administration (FDA) requires that all antidepressants carry a warning that some children, adolescents and young adults may be at increased risk of suicide when taking antidepressants. Anyone taking an antidepressant should be watched closely for worsening depression or unusual behavior especially in the first few weeks after starting an SSRI. Keep in mind, antidepressants are more likely to reduce suicide risk in the long run by improving mood.   SEROTONIN SYNDROME:  Discussed risks of Serotonin syndrome (ie, serotonin toxicity) which is a potentially life-threatening condition associated with increased serotonergic activity in the central nervous system (CNS). It is seen with therapeutic medication use, inadvertent interactions between drugs, and intentional self-poisoning. Serotonin syndrome may involve a spectrum of clinical findings, which often include mental status changes, autonomic hyperactivity, and neuromuscular abnormalities.    BENZODIAZEPINE:  discussion on how benzos work and the  need to use them short term due to potential of anxiety getting.  This is a controlled substance with risk for abuse, need to keep in a safe keep place and cannot replace lost scripts.    HARM REDUCTION:  Discussions regarding effects of mood altering substances, alcohol and cannabis, on mood and that approach is harm reduction, will continue to prescribe meds as they work to cut back use.    FIRST GENERATION ANTIPSYCHOTIC/ SECOND GENERATION ANTIPSYCHOTIC USE:  Atypical need for cardiometabolic monitoring with medication- B/P, weight, blood sugar, cholesterol.  Need to monitor for abnormal movements taught  SAFETY:  We all care about your loved one's safety. To reduce the risk of self-harm, remove access to all:  Firearms, Medicines (both prescribed and over-the-counter), Knives and other sharp objects, Ropes and like materials, and Alcohol  SLEEP HYGIENE: establish a sleep routine, limit screen time 1 hour prior to bed, use bed for sleep only, take sleep/medications on time (including sleepy time tea, trazadone or herbal treatments such as melatonin), aroma therapy, limit caffeine/sugar, yoga, guided imagery, stretch, meditation, limit naps to 20 minutes, make a temperature change in the room, white noise, be mindful of slowing down breathing, take a warm bath/shower, frequently wash sheets, and journaling.   Medlineplus.gov is information for patients.  It is run by the National Library of Medicine and it contains information about all disorders, diseases and all medications.      COMMUNITY RESOURCES:    CRISIS NUMBERS: Provided in AVS 3/3/2023  National Suicide Prevention Lifeline: 2-356-042-TALK (380-832-8988)  MarkTend/resources for a list of additional resources (SOS)            [facility] - [phone]   Urgent Care Adult Mental [place]-[phone] mobile unit/ 24/7 crisis line  [facility] -[phone]   COPE 24/7 [place] Mobile Team -[phone] (adults)/  [phone] (child)  Poison Control Center - 8-245-583-9555    OR  go to nearest ER  Crisis Text Line for any crisis  send this-   To: 422173   [facility] ([facility]) [facility] ER  [phone]  National Suicide Prevention Lifeline: 957.520.5235 (TTY: 124.286.4215). Call anytime for help.  (www.suicidepreventionlifeline.org)  [employer] [place] [employer] (www.ellie.org): 900.654.9342 or 469-110-3034.   Mental Health Association (www.mentalhealth.org): 314.971.3188 or 682-144-3898.  [place] Crisis Text Line: Text [place] to 318931  Suicide LifeLine Chat: suicidepreFoods You Can.org/chat    ADMINISTRATIVE BILLIN min spent interviewing patient, reviewing referral documents, obtaining and reviewing outside records, communication with other health specialists, and preparing this report on today's date: 3/3/2023  Video/Phone Start Time: 11:09 am  Video/Phone End Time:  11:23 am      Signed:     Ezekiel Cheng, MSN, APRN, PMHNP-BC     Chart documentation done in part with Dragon Voice Recognition software.  Although reviewed after completion, some word and grammatical errors may remain.  Answers for HPI/ROS submitted by the patient on 2022  If you checked off any problems, how difficult have these problems made it for you to do your work, take care of things at home, or get along with other people?: Somewhat difficult  PHQ9 TOTAL SCORE: 7  DONELL 7 TOTAL SCORE: 13    Answers for HPI/ROS submitted by the patient on 2/3/2023  If you checked off any problems, how difficult have these problems made it for you to do your work, take care of things at home, or get along with other people?: Very difficult  PHQ9 TOTAL SCORE: 3  DONELL 7 TOTAL SCORE: 6     Principal Discharge DX:	Dislocation of right shoulder joint, initial encounter

## 2023-03-12 NOTE — H&P PST ADULT - PROBLEM/PLAN-5
FAMILY HISTORY:  Grandparent  Still living? Unknown  Family history of diabetes mellitus, Age at diagnosis: Age Unknown     DISPLAY PLAN FREE TEXT

## 2023-03-30 ENCOUNTER — TRANSCRIPTION ENCOUNTER (OUTPATIENT)
Age: 73
End: 2023-03-30

## 2023-03-30 ENCOUNTER — OUTPATIENT (OUTPATIENT)
Dept: OUTPATIENT SERVICES | Facility: HOSPITAL | Age: 73
LOS: 1 days | End: 2023-03-30
Payer: MEDICARE

## 2023-03-30 VITALS
WEIGHT: 225.09 LBS | OXYGEN SATURATION: 96 % | SYSTOLIC BLOOD PRESSURE: 146 MMHG | TEMPERATURE: 98 F | DIASTOLIC BLOOD PRESSURE: 81 MMHG | HEIGHT: 71 IN | RESPIRATION RATE: 14 BRPM | HEART RATE: 62 BPM

## 2023-03-30 VITALS
DIASTOLIC BLOOD PRESSURE: 67 MMHG | OXYGEN SATURATION: 96 % | HEART RATE: 62 BPM | SYSTOLIC BLOOD PRESSURE: 120 MMHG | RESPIRATION RATE: 15 BRPM

## 2023-03-30 DIAGNOSIS — Z95.818 PRESENCE OF OTHER CARDIAC IMPLANTS AND GRAFTS: ICD-10-CM

## 2023-03-30 DIAGNOSIS — Z90.49 ACQUIRED ABSENCE OF OTHER SPECIFIED PARTS OF DIGESTIVE TRACT: Chronic | ICD-10-CM

## 2023-03-30 DIAGNOSIS — Z95.818 PRESENCE OF OTHER CARDIAC IMPLANTS AND GRAFTS: Chronic | ICD-10-CM

## 2023-03-30 LAB
ANION GAP SERPL CALC-SCNC: 10 MMOL/L — SIGNIFICANT CHANGE UP (ref 5–17)
BUN SERPL-MCNC: 13.3 MG/DL — SIGNIFICANT CHANGE UP (ref 8–20)
CALCIUM SERPL-MCNC: 7.6 MG/DL — LOW (ref 8.4–10.5)
CHLORIDE SERPL-SCNC: 107 MMOL/L — SIGNIFICANT CHANGE UP (ref 96–108)
CO2 SERPL-SCNC: 22 MMOL/L — SIGNIFICANT CHANGE UP (ref 22–29)
CREAT SERPL-MCNC: 0.75 MG/DL — SIGNIFICANT CHANGE UP (ref 0.5–1.3)
EGFR: 95 ML/MIN/1.73M2 — SIGNIFICANT CHANGE UP
GLUCOSE SERPL-MCNC: 113 MG/DL — HIGH (ref 70–99)
HCT VFR BLD CALC: 42 % — SIGNIFICANT CHANGE UP (ref 39–50)
HGB BLD-MCNC: 14.5 G/DL — SIGNIFICANT CHANGE UP (ref 13–17)
MCHC RBC-ENTMCNC: 33 PG — SIGNIFICANT CHANGE UP (ref 27–34)
MCHC RBC-ENTMCNC: 34.5 GM/DL — SIGNIFICANT CHANGE UP (ref 32–36)
MCV RBC AUTO: 95.5 FL — SIGNIFICANT CHANGE UP (ref 80–100)
PLATELET # BLD AUTO: 202 K/UL — SIGNIFICANT CHANGE UP (ref 150–400)
POTASSIUM SERPL-MCNC: 4 MMOL/L — SIGNIFICANT CHANGE UP (ref 3.5–5.3)
POTASSIUM SERPL-SCNC: 4 MMOL/L — SIGNIFICANT CHANGE UP (ref 3.5–5.3)
RBC # BLD: 4.4 M/UL — SIGNIFICANT CHANGE UP (ref 4.2–5.8)
RBC # FLD: 13.2 % — SIGNIFICANT CHANGE UP (ref 10.3–14.5)
SODIUM SERPL-SCNC: 139 MMOL/L — SIGNIFICANT CHANGE UP (ref 135–145)
WBC # BLD: 7.91 K/UL — SIGNIFICANT CHANGE UP (ref 3.8–10.5)
WBC # FLD AUTO: 7.91 K/UL — SIGNIFICANT CHANGE UP (ref 3.8–10.5)

## 2023-03-30 PROCEDURE — 93312 ECHO TRANSESOPHAGEAL: CPT | Mod: 26

## 2023-03-30 PROCEDURE — 93312 ECHO TRANSESOPHAGEAL: CPT

## 2023-03-30 PROCEDURE — 93320 DOPPLER ECHO COMPLETE: CPT | Mod: 26

## 2023-03-30 PROCEDURE — 80048 BASIC METABOLIC PNL TOTAL CA: CPT

## 2023-03-30 PROCEDURE — 93325 DOPPLER ECHO COLOR FLOW MAPG: CPT

## 2023-03-30 PROCEDURE — 93005 ELECTROCARDIOGRAM TRACING: CPT

## 2023-03-30 PROCEDURE — 85027 COMPLETE CBC AUTOMATED: CPT

## 2023-03-30 PROCEDURE — 76376 3D RENDER W/INTRP POSTPROCES: CPT | Mod: 26

## 2023-03-30 PROCEDURE — 36415 COLL VENOUS BLD VENIPUNCTURE: CPT

## 2023-03-30 PROCEDURE — 93010 ELECTROCARDIOGRAM REPORT: CPT

## 2023-03-30 PROCEDURE — 93325 DOPPLER ECHO COLOR FLOW MAPG: CPT | Mod: 26

## 2023-03-30 PROCEDURE — 93320 DOPPLER ECHO COMPLETE: CPT

## 2023-03-30 RX ORDER — TERAZOSIN HYDROCHLORIDE 10 MG/1
1 CAPSULE ORAL
Qty: 0 | Refills: 0 | DISCHARGE

## 2023-03-30 RX ORDER — LANOLIN ALCOHOL/MO/W.PET/CERES
2 CREAM (GRAM) TOPICAL
Qty: 0 | Refills: 0 | DISCHARGE

## 2023-03-30 RX ORDER — ASPIRIN/CALCIUM CARB/MAGNESIUM 324 MG
1 TABLET ORAL
Qty: 0 | Refills: 0 | DISCHARGE

## 2023-03-30 RX ORDER — TAMSULOSIN HYDROCHLORIDE 0.4 MG/1
1 CAPSULE ORAL
Qty: 0 | Refills: 0 | DISCHARGE

## 2023-03-30 RX ORDER — ATORVASTATIN CALCIUM 80 MG/1
1 TABLET, FILM COATED ORAL
Qty: 0 | Refills: 0 | DISCHARGE

## 2023-03-30 RX ORDER — ESCITALOPRAM OXALATE 10 MG/1
1 TABLET, FILM COATED ORAL
Qty: 0 | Refills: 0 | DISCHARGE

## 2023-03-30 RX ORDER — CHOLECALCIFEROL (VITAMIN D3) 125 MCG
1 CAPSULE ORAL
Qty: 0 | Refills: 0 | DISCHARGE

## 2023-03-30 RX ORDER — FENOFIBRATE,MICRONIZED 130 MG
1 CAPSULE ORAL
Qty: 0 | Refills: 0 | DISCHARGE

## 2023-03-30 RX ORDER — RAMIPRIL 5 MG
1 CAPSULE ORAL
Qty: 0 | Refills: 0 | DISCHARGE

## 2023-03-30 RX ORDER — FLUTICASONE FUROATE, UMECLIDINIUM BROMIDE AND VILANTEROL TRIFENATATE 200; 62.5; 25 UG/1; UG/1; UG/1
1 POWDER RESPIRATORY (INHALATION)
Qty: 0 | Refills: 0 | DISCHARGE

## 2023-03-30 NOTE — H&P PST ADULT - SKIN
If she can get out of bed due to pain have her call 911 to go to the emergency room warm and dry/color normal/normal/no rashes/no ulcers

## 2023-03-30 NOTE — H&P PST ADULT - HISTORY OF PRESENT ILLNESS
73 y/o male, PMH significant for HTN, HLD, COPD, BRANDIN uses mouth piece, and paroxysmal atrial fibrillation unable to tolerate long term anticoagulation due to frequent falls (s/p LAAO with Watchman device 3/4/22 with Dr. Franklin), who presents today for elective CINTIA for > 1 year post Watchman implant assessment. 45 day post Watchman CINTIA performed on 4/27/22 revealed no DRT or leak surrounding device. Pt has since been taken off Plavix and has been maintained on Aspirin 81mg alone. Pt reports no complaints at time of arrival today. Currently denies chest pain, SOB, fever, chills, nausea, vomiting, palpitations, recent falls. Pt reports last PO intake was > 8 hours PTA.       Cardiac Summary:   CINTIA 4/27/22: LVEF 60-65%   Echocardiogram 2/21 LVH, normal EF no significant valvular disease  Stress Test 2021: normal EF and normal perfusion   Cardiac Cath: 2/17/22 No obstructive coronary artery disease  requiring intervention prior to Watchman procedure

## 2023-03-30 NOTE — H&P PST ADULT - ASSESSMENT
73 y/o male, PMH significant for HTN, HLD, COPD, BRANDIN uses mouth piece, and paroxysmal atrial fibrillation unable to tolerate long term anticoagulation due to frequent falls (s/p LAAO with Watchman device 3/4/22 with Dr. Franklin), who presents today for elective CINTIA for > 1 year post Watchman implant assessment. 45 day post Watchman CINTIA performed on 4/27/22 revealed no DRT or leak surrounding device. Pt has since been taken off Plavix and has been maintained on Aspirin 81mg alone. Pt reports no complaints at time of arrival today. Currently denies chest pain, SOB, fever, chills, nausea, vomiting, palpitations, recent falls. Pt reports last PO intake was > 8 hours PTA.     Plan:  1) 1 year post Watchman implant CINTIA  - Labs / EKG  - Consent with attending   - Maintain NPO status

## 2023-03-30 NOTE — DISCHARGE NOTE PROVIDER - CARE PROVIDER_API CALL
Dorcas Paulson)  Cardiology; Internal Medicine  [address], [address]  [place], [place] [id number]  Phone: [phone]  Fax: [phone]  Follow Up Time:     Marly Franklin)  Cardiac Electrophysiology; Cardiovascular Disease; Internal Medicine  [address] [place] [id number]  Phone: [phone]  Fax: [phone]  Follow Up Time:

## 2023-03-30 NOTE — PROGRESS NOTE ADULT - SUBJECTIVE AND OBJECTIVE BOX
Pt doing well s/p uncomplicated CINTIA. Denies any complaints post procedure.     Exam:   VSS, NAD, A&O x 3  Card: S1/S2, RRR, no m/g/r  Resp: lungs CTA b/l  Abd: S/NT/ND  Ext: no edema, distal pulses intact    CINTIA: No DRT, No leak, preserved EF. (preliminary read)    Assessment:   71 y/o male, PMH significant for HTN, HLD, COPD, BRANDIN uses mouth piece, and paroxysmal atrial fibrillation unable to tolerate long term anticoagulation due to frequent falls (s/p LAAO with Watchman device 3/4/22 with Dr. Franklin), who presents today for elective CINTIA for > 1 year post Watchman implant assessment. 45 day post Watchman CINTIA performed on 4/27/22 revealed no DRT or leak surrounding device. Pt has since been taken off Plavix and has been maintained on Aspirin 81mg alone. Pt is now status post CINTIA negative for DRT and device leak. Pt reports no complaints post procedure.     Plan:   Observation on telemetry per post op protocol.    Resume PO intake.   Ambulate w/ assist once fully awake & back to baseline mental status w/ VSS.  Continue Aspirin 81mg QD. Importance of strict compliance reinforced with pt.   Resume other home medications.   Anticipate d/c home once all criteria met, with outpt f/up .

## 2023-03-30 NOTE — DISCHARGE NOTE PROVIDER - NSDCFUADDINST_GEN_ALL_CORE_FT
Follow up with Dr. Franklin in 4 - 6 weeks. Our office will contact you in 3-5 days to schedule this appointment. Please call [phone] with questions or concerns.

## 2023-03-30 NOTE — DISCHARGE NOTE PROVIDER - NSDCCPCAREPLAN_GEN_ALL_CORE_FT
PRINCIPAL DISCHARGE DIAGNOSIS  Diagnosis: Paroxysmal atrial fibrillation  Assessment and Plan of Treatment:     
[facility]

## 2023-03-30 NOTE — H&P PST ADULT - MUSCULOSKELETAL
no negative normal/ROM intact/normal gait/strength 5/5 bilateral upper extremities/strength 5/5 bilateral lower extremities

## 2023-03-30 NOTE — DISCHARGE NOTE PROVIDER - NSDCMRMEDTOKEN_GEN_ALL_CORE_FT
Aspir 81 oral delayed release tablet: 1 tab(s) orally once a day  atorvastatin 10 mg oral tablet: 1 tab(s) orally once a day  escitalopram 20 mg oral tablet: 1 tab(s) orally once a day  fenofibrate 160 mg oral tablet: 1 tab(s) orally once a day  Flomax 0.4 mg oral capsule: 1 cap(s) orally once a day (at bedtime)  terazosin 5 mg oral capsule: 1 cap(s) orally once a day (at bedtime)  Toprol-XL 25 mg oral tablet, extended release: 1 tab(s) orally once a day

## 2023-03-30 NOTE — DISCHARGE NOTE NURSING/CASE MANAGEMENT/SOCIAL WORK - NSDCPEFALRISK_GEN_ALL_CORE
For information on Fall & Injury Prevention, visit: [email or web address] OR  [email or web address] OR  https://www.cdc.gov/steadi/patient.html

## 2023-03-30 NOTE — ASU DISCHARGE PLAN (ADULT/PEDIATRIC) - NS MD DC FALL RISK RISK
For information on Fall & Injury Prevention, visit: [email or web address] OR  [email or web address] OR  https://www.cdc.gov/steadi/patient.html

## 2023-03-30 NOTE — DISCHARGE NOTE PROVIDER - NSDCCPTREATMENT_GEN_ALL_CORE_FT
PRINCIPAL PROCEDURE  Procedure: Transesophageal echocardiogram (CINTIA)  Findings and Treatment: -Take each dose of your anticoagulation medication (blood thinner) exactly as prescribed.   -Resume your diet with soft foods first.  - Do not drive, operate heavy machinery or make important decisions for 24 hours following the procedure.  - You may resume all other activities the day after the procedure.  Call your doctor if:   -you develop a fever, cough up blood, have any chest pain, palpitations or difficulty breathing. You may take a throat lozenge if you develop a sore throat or try warm salt water gargle.   - you have any questions or concerns regarding the procedure.  If you experience increased difficulty breathing or chest pain, or if you faint, have dizzy spells, or for any other distressing symptom, please seek immediate medical attention.

## 2023-03-30 NOTE — DISCHARGE NOTE PROVIDER - HOSPITAL COURSE
73 y/o male, PMH significant for HTN, HLD, COPD, BRANDIN uses mouth piece, and paroxysmal atrial fibrillation unable to tolerate long term anticoagulation due to frequent falls (s/p LAAO with Watchman device 3/4/22 with Dr. Franklin), who presents today for elective CINTIA for > 1 year post Watchman implant assessment. 45 day post Watchman CINTIA performed on 4/27/22 revealed no DRT or leak surrounding device. Pt has since been taken off Plavix and has been maintained on Aspirin 81mg alone. Pt is now status post CINTIA negative for DRT and device leak. Pt reports no complaints post procedure. The patient was observed per post procedure protocol, then discharged home with a plan for outpatient follow up.

## 2023-03-30 NOTE — DISCHARGE NOTE NURSING/CASE MANAGEMENT/SOCIAL WORK - PATIENT PORTAL LINK FT
You can access the FollowMyHealth Patient Portal offered by [facility] by registering at the following website: [email or web address]. By joining [employer]’s FollowMyHealth portal, you will also be able to view your health information using other applications (apps) compatible with our system.

## 2023-03-30 NOTE — H&P PST ADULT - NSCAFFEAMTFREQ_GEN_ALL_CORE_SD
Referral from KYLE Vines CM for a Targeted Medication Review. Medication history completed by technician: Yes. Pharmacist will contact patient to complete the referral.     Sonia Mcginnis  [facility] Technician   10/23/2020 12:43 PM     1-2 cups/cans per day

## 2023-03-30 NOTE — H&P PST ADULT - NSICDXPASTSURGICALHX_GEN_ALL_CORE_FT
PAST SURGICAL HISTORY:  Presence of Watchman left atrial appendage closure device     S/P appendectomy

## 2023-04-18 NOTE — H&P PST ADULT - HISTORY OF PRESENT ILLNESS
72 y/o M with PMH AFIB on Eliquis and metoprolol, HTN, HLD, COPD, BRANDIN uses mouth piece.  Presents to [facility] for Watchmans procedure, patient is a high risk for falls ambulating with cane  last fall was yesterday, reports he got lightheaded and fall, denies hitting his head, landed on right shoulder and side, no injury, denies pain, denies loss of consciousness   reports dyspnea on exertion, wheezing at times, cough productive sputum clear tinged yellow at times   denies chest pain, sob at rest, palpitations,     CHADS-Vasc 5(HTN, age, carotid plaque, CVA), HLD COPD, AFib on Eliquis with frequent falls. Referred for possible PRETTY occlusion/Watchman and CT scan revealed coronary calcifications. He presents for [facility] for further evaluation prior to Watchman.     Symptoms:        Angina (Class): N/A        Ischemic Symptoms:     Heart Failure:        Systolic/Diastolic/Combined: NA       NYHA Class (within 2 weeks):     Assessment of LVEF (Must be within 6 months):       EF: 50%       Assessed by: TTE       Date: 2/9/21    Prior Cardiac Interventions ([facility], stents, CABG):       PCI's (Date, Stents, Vessels): NA       CABG (Date, Grafts):     Noninvasive Testing:   Stress Test: Date: 2/9/21       Protocol: Regadenoson       Myocardial Imaging: Normal perfusion       Risk Assessment (Low, Medium, High):     Echo (Date, Findings): 2/9/21 mild LVH, normal wall motion, no sig valvular disease    Antianginal Therapies:        Beta Blockers:  Toprol       Calcium Channel Blockers:        Long Acting Nitrates:        Ranexa:     Associated Risk Factors:        Cerebrovascular Disease: CVA       Chronic Lung Disease: COPD/BRANDIN       Peripheral Arterial Disease: N/A       Chronic Kidney Disease (if yes, what is GFR): N/A       Uncontrolled Diabetes (if yes, what is HgbA1C or FBS): N/A       Poorly Controlled Hypertension (if yes, what is SBP): N/A       Morbid Obesity (if yes, what is BMI): N/A       History of Recent Ventricular Arrhythmia: N/A       Inability to Ambulate Safely: Freq falls       Need for Therapeutic Anticoagulation: Eliquis       Antiplatelet or Contrast Allergy: N/A     PAST MEDICAL HISTORY:  Deep vein thrombosis (DVT)     HTN (hypertension)     Lymphedema of leg      70 y/o M with PMH HTN, HLD, COPD, BRANDIN uses mouth piece, and atrial fibrillation unable to tolerate long term anticoagulation due to frequent falls. Patient denies history of alcohol abuse, states he drinks 2 beers per day, however as per cardiologist note patients daughter Isaura whom he lives with states he is drinking heavily, and that he is falling more than reported.  Accordingly, he has a high risk of CVA without anticoagulation, but also has a high risk of significant bleeding complication while on anticoagulation.  Presents to [facility] for Watchmans procedure, patient is ambulating with cane, his last fall was yesterday, he states he became lightheaded prior to the fall. He denies hitting his head, he states he landed on right hip and shoulder but denies injury or any pain today at [facility], he denies LOC.  He reports dyspnea on exertion, wheezing at times, productive cough with clear to yellow tinged sputum.  He denies chest pain, sob at rest, palpitations, lower extremity swelling.      Echocardiogram 2/21 LVH, normal EF no significant valvular disease  Stress Test 2021: normal EF and normal perfusion   Cardiac CT:   Cardiac Cath: 2/17/22 No obstructive coronary artery disease  requiring intervention prior to wtachman procedure  Cardiac surgery: denies   CHADS-Vasc 5(HTN, age, carotid plaque, CVA), HLD COPD, AFib on Eliquis with frequent falls. Referred for possible PRETTY occlusion/Watchman and CT scan revealed coronary calcifications. He presents for [facility] for further evaluation prior to Watchman.     Symptoms:        Angina (Class): N/A        Ischemic Symptoms:     Heart Failure:        Systolic/Diastolic/Combined: NA       NYHA Class (within 2 weeks):     Assessment of LVEF (Must be within 6 months):       EF: 50%       Assessed by: TTE       Date: 2/9/21    Prior Cardiac Interventions ([facility], stents, CABG):       PCI's (Date, Stents, Vessels): NA       CABG (Date, Grafts):     Noninvasive Testing:   Stress Test: Date: 2/9/21       Protocol: Regadenoson       Myocardial Imaging: Normal perfusion       Risk Assessment (Low, Medium, High):     Echo (Date, Findings): 2/9/21 mild LVH, normal wall motion, no sig valvular disease    Antianginal Therapies:        Beta Blockers:  Toprol       Calcium Channel Blockers:        Long Acting Nitrates:        Ranexa:     Associated Risk Factors:        Cerebrovascular Disease: CVA       Chronic Lung Disease: COPD/BRANDIN       Peripheral Arterial Disease: N/A       Chronic Kidney Disease (if yes, what is GFR): N/A       Uncontrolled Diabetes (if yes, what is HgbA1C or FBS): N/A       Poorly Controlled Hypertension (if yes, what is SBP): N/A       Morbid Obesity (if yes, what is BMI): N/A       History of Recent Ventricular Arrhythmia: N/A       Inability to Ambulate Safely: Freq falls       Need for Therapeutic Anticoagulation: Eliquis       Antiplatelet or Contrast Allergy: N/A     72 y/o M with PMH HTN, HLD, COPD, BRANDIN uses mouth piece, and atrial fibrillation unable to tolerate long term anticoagulation due to frequent falls. Patient denies history of alcohol abuse, states he drinks 2 beers per day, however as per cardiologist note patients daughter Isaura whom he lives with states he is drinking heavily, and that he is falling more than reported.  Accordingly, he has a high risk of CVA without anticoagulation, but also has a high risk of significant bleeding complication while on anticoagulation.  Presents to [facility] for Watchmans procedure, patient is ambulating with cane, his last fall was yesterday, he states he became lightheaded prior to the fall. He denies hitting his head, he states he landed on right hip and shoulder but denies injury or any pain today at [facility], he denies LOC.  He reports dyspnea on exertion, wheezing at times, productive cough with clear to yellow tinged sputum.  He denies chest pain, sob at rest, palpitations, lower extremity swelling.      Echocardiogram 2/21 LVH, normal EF no significant valvular disease  Stress Test 2021: normal EF and normal perfusion   Cardiac CT:   Cardiac Cath: 2/17/22 No obstructive coronary artery disease  requiring intervention prior to wtachman procedure  Cardiac surgery: denies

## 2023-05-18 NOTE — BRIEF OPERATIVE NOTE - NSEVIDNCEORABSCESS_GEN_ALL_CORE
Quality 130: Documentation Of Current Medications In The Medical Record: Current Medications Documented Detail Level: Detailed Quality 226: Preventive Care And Screening: Tobacco Use: Screening And Cessation Intervention: Patient screened for tobacco use and is an ex/non-smoker other

## 2023-06-07 NOTE — ASU PATIENT PROFILE, ADULT - NSSUBSTANCEUSE_GEN_ALL_CORE_SD
Procedure:  COLONOSCOPY    Relevant Problems   ANESTHESIA (within normal limits)      CARDIO (within normal limits)      ENDO (within normal limits)      GI/HEPATIC (within normal limits)      /RENAL (within normal limits)      GYN   (+) Breast cancer (HCC)      HEMATOLOGY (within normal limits)      MUSCULOSKELETAL (within normal limits)      NEURO/PSYCH (within normal limits)      PULMONARY (within normal limits)      Other   (+) Fibroids      Lab Results   Component Value Date    HCT 34 5 (L) 07/22/2020    HGB 11 4 (L) 07/22/2020     (H) 07/22/2020     07/22/2020    WBC 8 94 07/22/2020     Lab Results   Component Value Date    AGAP 5 07/22/2020    ALKPHOS 86 07/13/2020    ALT 50 07/13/2020    AST 38 07/13/2020    BUN 5 07/22/2020    CALCIUM 8 8 07/22/2020     (H) 07/22/2020    CO2 24 07/22/2020    CREATININE 0 36 (L) 07/22/2020    EGFR 133 07/22/2020    GLUC 105 07/22/2020    K 4 0 07/22/2020    SODIUM 139 07/22/2020    TBILI 0 37 07/13/2020    TP 6 8 07/13/2020     Lab Results   Component Value Date    PTT 29 07/13/2020     Lab Results   Component Value Date    INR 1 04 07/13/2020    PROTIME 13 0 07/13/2020       Physical Exam    Airway    Mallampati score: II  TM Distance: >3 FB  Neck ROM: full     Dental   No notable dental hx     Cardiovascular  Rhythm: regular, Rate: normal, Cardiovascular exam normal    Pulmonary  Pulmonary exam normal Breath sounds clear to auscultation,     Other Findings        Anesthesia Plan  ASA Score- 2     Anesthesia Type- IV sedation with anesthesia with ASA Monitors  Additional Monitors:   Airway Plan:           Plan Factors-Exercise tolerance (METS): >4 METS  Chart reviewed  EKG reviewed  Imaging results reviewed  Existing labs reviewed  Patient summary reviewed  Patient is not a current smoker  Patient did not smoke on day of surgery  Obstructive sleep apnea risk education given perioperatively  Induction- intravenous      Postoperative Plan-     Informed Consent- Anesthetic plan and risks discussed with patient  I personally reviewed this patient with the CRNA  Discussed and agreed on the Anesthesia Plan with the CRNA  Adams Wick caffeine

## 2023-06-08 NOTE — ED ADULT TRIAGE NOTE - NS ED NURSE AMBULANCES
Detail Level: Generalized Include Location In Plan?: No [facility]

## 2023-09-16 ENCOUNTER — EMERGENCY (EMERGENCY)
Facility: HOSPITAL | Age: 73
LOS: 1 days | Discharge: HOME | End: 2023-09-16
Attending: EMERGENCY MEDICINE
Payer: MEDICARE

## 2023-09-16 VITALS
OXYGEN SATURATION: 92 % | RESPIRATION RATE: 16 BRPM | HEART RATE: 82 BPM | TEMPERATURE: 98 F | WEIGHT: 184.97 LBS | DIASTOLIC BLOOD PRESSURE: 79 MMHG | HEIGHT: 70 IN | SYSTOLIC BLOOD PRESSURE: 120 MMHG

## 2023-09-16 VITALS
DIASTOLIC BLOOD PRESSURE: 80 MMHG | TEMPERATURE: 98 F | SYSTOLIC BLOOD PRESSURE: 148 MMHG | OXYGEN SATURATION: 94 % | HEART RATE: 82 BPM | RESPIRATION RATE: 16 BRPM

## 2023-09-16 DIAGNOSIS — I48.20 CHRONIC ATRIAL FIBRILLATION, UNSPECIFIED: ICD-10-CM

## 2023-09-16 DIAGNOSIS — Z90.49 ACQUIRED ABSENCE OF OTHER SPECIFIED PARTS OF DIGESTIVE TRACT: Chronic | ICD-10-CM

## 2023-09-16 DIAGNOSIS — Z86.73 PERSONAL HISTORY OF TRANSIENT ISCHEMIC ATTACK (TIA), AND CEREBRAL INFARCTION WITHOUT RESIDUAL DEFICITS: ICD-10-CM

## 2023-09-16 DIAGNOSIS — I10 ESSENTIAL (PRIMARY) HYPERTENSION: ICD-10-CM

## 2023-09-16 DIAGNOSIS — R55 SYNCOPE AND COLLAPSE: ICD-10-CM

## 2023-09-16 DIAGNOSIS — Z95.818 PRESENCE OF OTHER CARDIAC IMPLANTS AND GRAFTS: Chronic | ICD-10-CM

## 2023-09-16 DIAGNOSIS — E78.2 MIXED HYPERLIPIDEMIA: ICD-10-CM

## 2023-09-16 DIAGNOSIS — I25.10 ATHEROSCLEROTIC HEART DISEASE OF NATIVE CORONARY ARTERY WITHOUT ANGINA PECTORIS: ICD-10-CM

## 2023-09-16 LAB
ALBUMIN SERPL ELPH-MCNC: 3.8 G/DL — SIGNIFICANT CHANGE UP (ref 3.3–5.2)
ALP SERPL-CCNC: 60 U/L — SIGNIFICANT CHANGE UP (ref 40–120)
ALT FLD-CCNC: 19 U/L — SIGNIFICANT CHANGE UP
ANION GAP SERPL CALC-SCNC: 14 MMOL/L — SIGNIFICANT CHANGE UP (ref 5–17)
AST SERPL-CCNC: 28 U/L — SIGNIFICANT CHANGE UP
BASOPHILS # BLD AUTO: 0.07 K/UL — SIGNIFICANT CHANGE UP (ref 0–0.2)
BASOPHILS NFR BLD AUTO: 0.8 % — SIGNIFICANT CHANGE UP (ref 0–2)
BILIRUB SERPL-MCNC: 0.6 MG/DL — SIGNIFICANT CHANGE UP (ref 0.4–2)
BUN SERPL-MCNC: 12.8 MG/DL — SIGNIFICANT CHANGE UP (ref 8–20)
CALCIUM SERPL-MCNC: 8.6 MG/DL — SIGNIFICANT CHANGE UP (ref 8.4–10.5)
CHLORIDE SERPL-SCNC: 99 MMOL/L — SIGNIFICANT CHANGE UP (ref 96–108)
CO2 SERPL-SCNC: 21 MMOL/L — LOW (ref 22–29)
CREAT SERPL-MCNC: 0.91 MG/DL — SIGNIFICANT CHANGE UP (ref 0.5–1.3)
EGFR: 88 ML/MIN/1.73M2 — SIGNIFICANT CHANGE UP
EOSINOPHIL # BLD AUTO: 0.18 K/UL — SIGNIFICANT CHANGE UP (ref 0–0.5)
EOSINOPHIL NFR BLD AUTO: 2.1 % — SIGNIFICANT CHANGE UP (ref 0–6)
GLUCOSE SERPL-MCNC: 138 MG/DL — HIGH (ref 70–99)
HCT VFR BLD CALC: 40.7 % — SIGNIFICANT CHANGE UP (ref 39–50)
HGB BLD-MCNC: 14.1 G/DL — SIGNIFICANT CHANGE UP (ref 13–17)
IMM GRANULOCYTES NFR BLD AUTO: 0.6 % — SIGNIFICANT CHANGE UP (ref 0–0.9)
LYMPHOCYTES # BLD AUTO: 1.43 K/UL — SIGNIFICANT CHANGE UP (ref 1–3.3)
LYMPHOCYTES # BLD AUTO: 16.3 % — SIGNIFICANT CHANGE UP (ref 13–44)
MAGNESIUM SERPL-MCNC: 1.6 MG/DL — LOW (ref 1.8–2.6)
MCHC RBC-ENTMCNC: 32.6 PG — SIGNIFICANT CHANGE UP (ref 27–34)
MCHC RBC-ENTMCNC: 34.6 GM/DL — SIGNIFICANT CHANGE UP (ref 32–36)
MCV RBC AUTO: 94 FL — SIGNIFICANT CHANGE UP (ref 80–100)
MONOCYTES # BLD AUTO: 0.88 K/UL — SIGNIFICANT CHANGE UP (ref 0–0.9)
MONOCYTES NFR BLD AUTO: 10 % — SIGNIFICANT CHANGE UP (ref 2–14)
NEUTROPHILS # BLD AUTO: 6.16 K/UL — SIGNIFICANT CHANGE UP (ref 1.8–7.4)
NEUTROPHILS NFR BLD AUTO: 70.2 % — SIGNIFICANT CHANGE UP (ref 43–77)
PLATELET # BLD AUTO: 152 K/UL — SIGNIFICANT CHANGE UP (ref 150–400)
POTASSIUM SERPL-MCNC: 3.8 MMOL/L — SIGNIFICANT CHANGE UP (ref 3.5–5.3)
POTASSIUM SERPL-SCNC: 3.8 MMOL/L — SIGNIFICANT CHANGE UP (ref 3.5–5.3)
PROT SERPL-MCNC: 6.6 G/DL — SIGNIFICANT CHANGE UP (ref 6.6–8.7)
RBC # BLD: 4.33 M/UL — SIGNIFICANT CHANGE UP (ref 4.2–5.8)
RBC # FLD: 12.8 % — SIGNIFICANT CHANGE UP (ref 10.3–14.5)
SODIUM SERPL-SCNC: 134 MMOL/L — LOW (ref 135–145)
TROPONIN T SERPL-MCNC: <0.01 NG/ML — SIGNIFICANT CHANGE UP (ref 0–0.06)
TROPONIN T SERPL-MCNC: <0.01 NG/ML — SIGNIFICANT CHANGE UP (ref 0–0.06)
WBC # BLD: 8.77 K/UL — SIGNIFICANT CHANGE UP (ref 3.8–10.5)
WBC # FLD AUTO: 8.77 K/UL — SIGNIFICANT CHANGE UP (ref 3.8–10.5)

## 2023-09-16 PROCEDURE — 99285 EMERGENCY DEPT VISIT HI MDM: CPT | Mod: 25

## 2023-09-16 PROCEDURE — 99285 EMERGENCY DEPT VISIT HI MDM: CPT

## 2023-09-16 PROCEDURE — 72125 CT NECK SPINE W/O DYE: CPT | Mod: 26,MA

## 2023-09-16 PROCEDURE — 70486 CT MAXILLOFACIAL W/O DYE: CPT | Mod: MA

## 2023-09-16 PROCEDURE — 70450 CT HEAD/BRAIN W/O DYE: CPT | Mod: 26,MA

## 2023-09-16 PROCEDURE — 80053 COMPREHEN METABOLIC PANEL: CPT

## 2023-09-16 PROCEDURE — 93010 ELECTROCARDIOGRAM REPORT: CPT

## 2023-09-16 PROCEDURE — 85025 COMPLETE CBC W/AUTO DIFF WBC: CPT

## 2023-09-16 PROCEDURE — 70486 CT MAXILLOFACIAL W/O DYE: CPT | Mod: 26,MA

## 2023-09-16 PROCEDURE — 93005 ELECTROCARDIOGRAM TRACING: CPT

## 2023-09-16 PROCEDURE — 84484 ASSAY OF TROPONIN QUANT: CPT

## 2023-09-16 PROCEDURE — 83735 ASSAY OF MAGNESIUM: CPT

## 2023-09-16 PROCEDURE — 36415 COLL VENOUS BLD VENIPUNCTURE: CPT

## 2023-09-16 PROCEDURE — 70450 CT HEAD/BRAIN W/O DYE: CPT | Mod: MA

## 2023-09-16 PROCEDURE — 96374 THER/PROPH/DIAG INJ IV PUSH: CPT

## 2023-09-16 PROCEDURE — 72125 CT NECK SPINE W/O DYE: CPT | Mod: MA

## 2023-09-16 RX ORDER — MAGNESIUM SULFATE 500 MG/ML
2 VIAL (ML) INJECTION ONCE
Refills: 0 | Status: COMPLETED | OUTPATIENT
Start: 2023-09-16 | End: 2023-09-16

## 2023-09-16 RX ORDER — MAGNESIUM SULFATE 500 MG/ML
1 VIAL (ML) INJECTION ONCE
Refills: 0 | Status: DISCONTINUED | OUTPATIENT
Start: 2023-09-16 | End: 2023-09-16

## 2023-09-16 RX ADMIN — Medication 25 GRAM(S): at 15:41

## 2023-09-16 NOTE — ED PROVIDER NOTE - NSFOLLOWUPINSTRUCTIONS_ED_ALL_ED_FT
follow up with cardiology in 1 week for work up of syncope  return to ed if symptoms return or worsen  avoid all drugs and alcohol follow up with cardiology in 1 week for work up of syncope  return to ed if symptoms return or worsen  avoid all drugs and alcohol    Syncope    Syncope is when you temporarily lose consciousness, also called fainting or passing out. It is caused by a sudden decrease in blood flow to the brain. Even though most causes of syncope are not dangerous, syncope can possibly be a sign of a serious medical problem. Signs that you may be about to faint include feeling dizzy, lightheaded, nausea, visual changes, or cold/clammy skin. Do not drive, operate heavy machinery, or play sports until your health care provider says it is okay.    SEEK IMMEDIATE MEDICAL CARE IF YOU HAVE ANY OF THE FOLLOWING SYMPTOMS: severe headache, pain in your chest/abdomen/back, bleeding from your mouth or rectum, palpitations, shortness of breath, pain with breathing, seizure, confusion, or trouble walking.

## 2023-09-16 NOTE — ED ADULT NURSE NOTE - OBJECTIVE STATEMENT
Pt s/p syncopal episode at home while outside. Pt has abrasion to right eyebrow. Had a fall yesterday as well.

## 2023-09-16 NOTE — ED PROVIDER NOTE - ATTENDING CONTRIBUTION TO CARE
syncopal episode with trauma to head and face; eval with ct of head, face and neck; cardiac monitoring and cardiology consult

## 2023-09-16 NOTE — CONSULT NOTE ADULT - SUBJECTIVE AND OBJECTIVE BOX
[facility]                                              CARDIOLOGY AT [place] [address]                                             Telephone: [phone]. Fax:[phone]                                                         CARDIOLOGY CONSULTATION NOTE                                                                                             Consult requested by: Dr. Garrido  History obtained by: Patient and medical record  Community Cardiologist: Dr. Dorcas Paulson, Dr. Marly Franklin   obtained: Yes [  ] No [X]  Reason for Consultation: Syncope  Avialable out pt records reviewed: Yes [  ] No [X]    Chief complaint:    Patient is a 72y old  Male who presents with a chief complaint of     HPI:    CARDIAC TESTING   CINTIA: 3/30/2023  Left Ventricle: Left ventricular ejection fraction, by visual estimation, is 55 to 60%.  Right Ventricle: Normal right ventricular size and function.  Left Atrium: The left atrium is normal in size. POST-WATCHMAN CINTIA: S/p WATCHMAN procedure. The device appears well-seated. No color Doppler flow with vena contracta approximately 0.1 cm is seen around the device. The maximum diameter of the compress device post placement is 18.7 mm at 0°, 18.7 mm at 45°, 19.5 mm at 90°, and 19.1 mm at 135°. The device was visualized in the left atrial appendage with 3D imaging   Right Atrium: The right atrium is normal in size.  Pericardium: There is no evidence of pericardial effusion.  Mitral Valve: Trace mitral valve regurgitation is seen.  Tricuspid Valve: Structurally normal tricuspid valve, with normal leaflet excursion. Trivial tricuspid regurgitation is visualized.  Aortic Valve: Normal trileaflet aortic valve with normal opening. The aortic valve is trileaflet. No evidence of aortic valve regurgitation is seen.  Pulmonic Valve: Structurally normal pulmonic valve, with normal leaflet excursion. Trace pulmonic valve regurgitation.  Aorta: The aortic root is normal in size and structure.  Pulmonary Artery: The main pulmonary artery is normal in size. Normal pulmonary artery pressure.  Venous: The pulmonary veins appear normal.    STRESS:    CATH: 2/17/2022  HEMODYNAMICS: Hemodynamic assessment demonstrates mildly elevated LVEDP: 17 mm hg.  VENTRICLES: There were no left ventricular global or regional wall motion abnormalities. Global left ventricular function was hypercontractile. EF estimated was 80 %.  VALVES: AORTIC VALVE: The aortic valve was evaluated by left ventriculography. The aortic valve appeared to be structurally normal. The aortic valve leaflets exhibited normal thickness and normal excursion. There was no aortic stenosis. MITRAL VALVE: The mitral valve was evaluated by left ventriculography. The mitral valve appeared grossly normal. The mitral leaflets exhibited normal thickness and normal excursion. The mitral valve exhibited no regurgitation.  CORONARY VESSELS: The coronary circulation is right dominant. There was no angiographic evidence for occlusive coronary artery disease.  LM:     --  LM: Normal. The vessel was normal sized, not calcified, and not tortuous. Angiography showed no evidence of disease.  LAD:     --  Proximal LAD: Angiography showed mild atherosclerosis with no flow limiting lesions.  --  Mid LAD: Normal.  --  Distal LAD: Normal.  --  D1: The vessel was small to medium sized. There was a 50 % stenosis in the middle third of the vessel segment.  CX:     --  Ostial circumflex: There was a 40 % stenosis.  --  Proximal circumflex: The vessel was small to medium sized. Angiography showed minor luminal irregularities with no flow limiting lesions.  --  Distal circumflex: The vessel was small sized. Angiography showed no evidence of disease.  --  OM1: Angiography showed minor luminal irregularities with no flow limiting lesions.  RCA:     --  RCA: Normal. The vessel was normal sized, not calcified, and not tortuous. Angiography showed minor luminal irregularities with no flow limiting lesions.    ELECTROPHYSIOLOGY:       PAST MEDICAL HISTORY  Hypertension  Hyperlipidemia, unspecified hyperlipidemia type  Chronic atrial fibrillation  History of BPH  History of COPD  BRANDIN (obstructive sleep apnea)  Atherosclerosis of both carotid arteries  CVA (cerebral vascular accident)  Bradycardia    PAST SURGICAL HISTORY  S/P appendectomy  Presence of Watchman left atrial appendage closure device    SOCIAL HISTORY:  Denies smoking/alcohol/drugs  MARITAL: , lives with daughter  CIGARETTES: Former 1.5 ppd smoker for 30 years, quit 15 years ago  ALCOHOL: 3-4 cans of beer daily  DRUGS: Denies  CAFFEINE: Occasional coffee.    FAMILY HISTORY:  Family history of diabetes mellitus (Mother)    Family History of Cardiovascular Disease:  Yes [  ] No [X]  Coronary Artery Disease in first degree relative: Yes [  ] No [X]  Sudden Cardiac Death in First degree relative: Yes [  ] No [X]    HOME MEDICATIONS:  Plavix 75 mg daily    CURRENT MEDICATIONS:    ALLERGIES: No Known Allergies    REVIEW OF SYMPTOMS:   CONSTITUTIONAL: No fever, no chills, no weight loss, no weight gain, no fatigue   ENMT:  No vertigo; No sinus or throat pain  NECK: No pain or stiffness  CARDIOVASCULAR: No chest pain, + exertional dyspnea, + syncope, + near syncope, no palpitations, no dizziness, no Orthopnea, no Paroxsymal nocturnal dyspnea  RESPIRATORY: no Shortness of breath, no cough, no wheezing  : No dysuria, no hematuria   GI: No dark color stool, no nausea, no diarrhea, no constipation, no abdominal pain   NEURO: No headache, no slurred speech   MUSCULOSKELETAL: No joint pain or swelling; No muscle, back, or extremity pain  PSYCH: No agitation, no anxiety.    ALL OTHER REVIEW OF SYSTEMS ARE NEGATIVE.    Vital Signs Last 24 Hrs  T(C): 36.7 (16 Sep 2023 12:52), Max: 36.7 (16 Sep 2023 12:52)  T(F): 98.1 (16 Sep 2023 12:52), Max: 98.1 (16 Sep 2023 12:52)  HR: 82 (16 Sep 2023 12:52) (82 - 82)  BP: 120/79 (16 Sep 2023 12:52) (120/79 - 120/79)  RR: 16 (16 Sep 2023 12:52) (16 - 16)  SpO2: 92% (16 Sep 2023 12:52) (92% - 92%)    Parameters below as of 16 Sep 2023 12:52  Patient On (Oxygen Delivery Method): room air    INTAKE AND OUTPUT:     PHYSICAL EXAM:  Constitutional: Comfortable . No acute distress.   HEENT: Atraumatic and normocephalic , neck is supple . no JVD. No carotid bruit.  CNS: A&Ox3. No focal deficits.   Respiratory: CTAB, unlabored   Cardiovascular: RRR normal s1 s2. No murmur. No rubs or gallop.  Gastrointestinal: Soft, non-tender. +Bowel sounds.   MSK: full ROM extremities x 4  Extremities: Radial: Right: 2+, Left: 1+, DP: Right: 2+, Left: 2+, no edema  Psychiatric: Calm . no agitation.   Skin: Warm and dry, no ulcers on extremities     LABS:                        14.1   8.77  )-----------( 152      ( 16 Sep 2023 13:44 )             40.7     09-16    134  |  99  |  12.8  ----------------------------<  138  3.8   |  21.0  |  0.91    Ca    8.6      16 Sep 2023 13:44  Mg     1.6     09-16    TPro  6.6  /  Alb  3.8  /  TBili  0.6  /  DBili  x   /  AST  28  /  ALT  19  /  AlkPhos  60  09-16    CARDIAC MARKERS ( 16 Sep 2023 13:44 ): <0.01 ng/mL        Urinalysis Basic - ( 16 Sep 2023 13:44 )  Color: x / Appearance: x / SG: x / pH: x  Gluc: 138 mg/dL / Ketone: x  / Bili: x / Urobili: x   Blood: x / Protein: x / Nitrite: x   Leuk Esterase: x / RBC: x / WBC x   Sq Epi: x / Non Sq Epi: x / Bacteria: x      INTERPRETATION OF TELEMETRY: AF    ECG:   Prior ECG: Yes [  ] No [  ]      RADIOLOGY & ADDITIONAL STUDIES:    X-ray:  reviewed by me.   CT scan:   MRI:   US:                                                [facility]                                              CARDIOLOGY AT [address], [place] [address]                                             Telephone: [phone]. Fax:[phone]                                                         CARDIOLOGY CONSULTATION NOTE                                                                                             Consult requested by: Dr. Garrido  History obtained by: Patient and medical record  Community Cardiologist: Dr. Dorcas Paulson, Dr. Marly Franklin   obtained: Yes [  ] No [X]  Reason for Consultation: Syncope  Available out pt records reviewed: Yes [X] No [  ]    Chief complaint:    Patient is a 72y old  Male who presents with a chief complaint of     HPI: 73y/o male former 45 pack/year smoker with history of PAF, S/P Watchman procedure, nonobstructive CAD, HTN, mixed HLD, CVA, COPD, BRANDIN (uses oral appliance) B/L carotid atherosclerosis, and bradycardia who was walking into house from a car, and collapsed with some head trauma. He states that he did not remember any precipitating symptoms. He has been having syncopal episodes for the past few months, but normally feels lightheaded and would lower himself to a sitting position. The last episode prior to this one was one week ago. He denies any chest pain, palpitations, orthopnea or PND. He does admit to some SANTOS when climbing one flight of stairs. He had a B/L carotid doppler in 2021 which showed moderate atherosclerosis without evidence of hemodynamically significant stenosis of the right carotid artery and mild atherosclerosis without evidence of hemodynamically significant stenosis of the left carotid artery.    CARDIAC TESTING   CINTIA: 3/30/2023  Left Ventricle: Left ventricular ejection fraction, by visual estimation, is 55 to 60%.  Right Ventricle: Normal right ventricular size and function.  Left Atrium: The left atrium is normal in size. POST-WATCHMAN CINTIA: S/p WATCHMAN procedure. The device appears well-seated. No color Doppler flow with vena contracta approximately 0.1 cm is seen around the device. The maximum diameter of the compress device post placement is 18.7 mm at 0°, 18.7 mm at 45°, 19.5 mm at 90°, and 19.1 mm at 135°. The device was visualized in the left atrial appendage with 3D imaging   Right Atrium: The right atrium is normal in size.  Pericardium: There is no evidence of pericardial effusion.  Mitral Valve: Trace mitral valve regurgitation is seen.  Tricuspid Valve: Structurally normal tricuspid valve, with normal leaflet excursion. Trivial tricuspid regurgitation is visualized.  Aortic Valve: Normal trileaflet aortic valve with normal opening. The aortic valve is trileaflet. No evidence of aortic valve regurgitation is seen.  Pulmonic Valve: Structurally normal pulmonic valve, with normal leaflet excursion. Trace pulmonic valve regurgitation.  Aorta: The aortic root is normal in size and structure.  Pulmonary Artery: The main pulmonary artery is normal in size. Normal pulmonary artery pressure.  Venous: The pulmonary veins appear normal.    STRESS:    CATH: 2/17/2022  HEMODYNAMICS: Hemodynamic assessment demonstrates mildly elevated LVEDP: 17 mm hg.  VENTRICLES: There were no left ventricular global or regional wall motion abnormalities. Global left ventricular function was hypercontractile. EF estimated was 80 %.  VALVES: AORTIC VALVE: The aortic valve was evaluated by left ventriculography. The aortic valve appeared to be structurally normal. The aortic valve leaflets exhibited normal thickness and normal excursion. There was no aortic stenosis. MITRAL VALVE: The mitral valve was evaluated by left ventriculography. The mitral valve appeared grossly normal. The mitral leaflets exhibited normal thickness and normal excursion. The mitral valve exhibited no regurgitation.  CORONARY VESSELS: The coronary circulation is right dominant. There was no angiographic evidence for occlusive coronary artery disease.  LM:     --  LM: Normal. The vessel was normal sized, not calcified, and not tortuous. Angiography showed no evidence of disease.  LAD:     --  Proximal LAD: Angiography showed mild atherosclerosis with no flow limiting lesions.  --  Mid LAD: Normal.  --  Distal LAD: Normal.  --  D1: The vessel was small to medium sized. There was a 50 % stenosis in the middle third of the vessel segment.  CX:     --  Ostial circumflex: There was a 40 % stenosis.  --  Proximal circumflex: The vessel was small to medium sized. Angiography showed minor luminal irregularities with no flow limiting lesions.  --  Distal circumflex: The vessel was small sized. Angiography showed no evidence of disease.  --  OM1: Angiography showed minor luminal irregularities with no flow limiting lesions.  RCA:     --  RCA: Normal. The vessel was normal sized, not calcified, and not tortuous. Angiography showed minor luminal irregularities with no flow limiting lesions.    ELECTROPHYSIOLOGY:       PAST MEDICAL HISTORY  Hypertension  Hyperlipidemia, unspecified hyperlipidemia type  Chronic atrial fibrillation  History of BPH  History of COPD  BRANDIN (obstructive sleep apnea)  Atherosclerosis of both carotid arteries  CVA (cerebral vascular accident)  Bradycardia    PAST SURGICAL HISTORY  S/P appendectomy  Presence of Watchman left atrial appendage closure device    SOCIAL HISTORY:  Denies smoking/alcohol/drugs  MARITAL: , lives with daughter  CIGARETTES: Former 1.5 ppd smoker for 30 years, quit 15 years ago  ALCOHOL: 3-4 cans of beer daily  DRUGS: Denies  CAFFEINE: Occasional coffee.    FAMILY HISTORY:  Family history of diabetes mellitus (Mother)    Family History of Cardiovascular Disease:  Yes [  ] No [X]  Coronary Artery Disease in first degree relative: Yes [  ] No [X]  Sudden Cardiac Death in First degree relative: Yes [  ] No [X]    HOME MEDICATIONS:  Plavix 75 mg daily    CURRENT MEDICATIONS:    ALLERGIES: No Known Allergies    REVIEW OF SYMPTOMS:   CONSTITUTIONAL: No fever, no chills, no weight loss, no weight gain, no fatigue   ENMT:  No vertigo; No sinus or throat pain  NECK: No pain or stiffness  CARDIOVASCULAR: No chest pain, + exertional dyspnea, + syncope, + near syncope, no palpitations, no dizziness, no Orthopnea, no Paroxsymal nocturnal dyspnea  RESPIRATORY: no Shortness of breath, no cough, no wheezing  : No dysuria, no hematuria   GI: No dark color stool, no nausea, no diarrhea, no constipation, no abdominal pain   NEURO: No headache, no slurred speech   MUSCULOSKELETAL: No joint pain or swelling; No muscle, back, or extremity pain  PSYCH: No agitation, no anxiety.    ALL OTHER REVIEW OF SYSTEMS ARE NEGATIVE.    Vital Signs Last 24 Hrs  T(C): 36.7 (16 Sep 2023 12:52), Max: 36.7 (16 Sep 2023 12:52)  T(F): 98.1 (16 Sep 2023 12:52), Max: 98.1 (16 Sep 2023 12:52)  HR: 82 (16 Sep 2023 12:52) (82 - 82)  BP: 120/79 (16 Sep 2023 12:52) (120/79 - 120/79)  RR: 16 (16 Sep 2023 12:52) (16 - 16)  SpO2: 92% (16 Sep 2023 12:52) (92% - 92%)    Parameters below as of 16 Sep 2023 12:52  Patient On (Oxygen Delivery Method): room air    INTAKE AND OUTPUT:     PHYSICAL EXAM:  Constitutional: Comfortable . No acute distress.   HEENT: Atraumatic and normocephalic , neck is supple . no JVD. No carotid bruit.  CNS: A&Ox3. No focal deficits.   Respiratory: CTAB, unlabored   Cardiovascular: RRR normal s1 s2. No murmur. No rubs or gallop.  Gastrointestinal: Soft, non-tender. +Bowel sounds.   MSK: full ROM extremities x 4  Extremities: Radial: Right: 2+, Left: 1+, DP: Right: 2+, Left: 2+, no edema  Psychiatric: Calm . no agitation.   Skin: Warm and dry, no ulcers on extremities     LABS:                        14.1   8.77  )-----------( 152      ( 16 Sep 2023 13:44 )             40.7     09-16    134  |  99  |  12.8  ----------------------------<  138  3.8   |  21.0  |  0.91    Ca    8.6      16 Sep 2023 13:44  Mg     1.6     09-16    TPro  6.6  /  Alb  3.8  /  TBili  0.6  /  DBili  x   /  AST  28  /  ALT  19  /  AlkPhos  60  09-16    CARDIAC MARKERS ( 16 Sep 2023 13:44 ): <0.01 ng/mL        Urinalysis Basic - ( 16 Sep 2023 13:44 )  Color: x / Appearance: x / SG: x / pH: x  Gluc: 138 mg/dL / Ketone: x  / Bili: x / Urobili: x   Blood: x / Protein: x / Nitrite: x   Leuk Esterase: x / RBC: x / WBC x   Sq Epi: x / Non Sq Epi: x / Bacteria: x      INTERPRETATION OF TELEMETRY: AF    ECG:   Prior ECG: Yes [  ] No [  ]      RADIOLOGY & ADDITIONAL STUDIES:    X-ray:  reviewed by me.   CT scan:   MRI:   US:                                                [facility]                                              CARDIOLOGY AT [address], [place] [address]                                             Telephone: [phone]. Fax:[phone]                                                         CARDIOLOGY CONSULTATION NOTE                                                                                             Consult requested by: Dr. Garrido  History obtained by: Patient and medical record  Community Cardiologist: Dr. Dorcas Paulson, Dr. Marly Franklin   obtained: Yes [  ] No [X]  Reason for Consultation: Syncope  Available out pt records reviewed: Yes [X] No [  ]    Chief complaint:    Patient is a 72y old  Male who presents with a chief complaint of syncope.    HPI: 71y/o male former 45 pack/year smoker with history of PAF, S/P Watchman procedure, nonobstructive CAD, HTN, mixed HLD, CVA, COPD, BRANDIN (uses oral appliance) B/L carotid atherosclerosis, and bradycardia who was walking into house from a car, and collapsed with some head trauma. He states that he did not remember any precipitating symptoms. He has been having syncopal episodes for the past few months, but normally feels lightheaded and would lower himself to a sitting position. The last episode prior to this one was one week ago. He denies any chest pain, palpitations, orthopnea or PND. He does admit to some SANTOS when climbing one flight of stairs. He had a B/L carotid doppler in 2021 which showed moderate atherosclerosis without evidence of hemodynamically significant stenosis of the right carotid artery and mild atherosclerosis without evidence of hemodynamically significant stenosis of the left carotid artery.    CARDIAC TESTING   CINTIA: 3/30/2023  Left Ventricle: Left ventricular ejection fraction, by visual estimation, is 55 to 60%.  Right Ventricle: Normal right ventricular size and function.  Left Atrium: The left atrium is normal in size. POST-WATCHMAN CINTIA: S/p WATCHMAN procedure. The device appears well-seated. No color Doppler flow with vena contracta approximately 0.1 cm is seen around the device. The maximum diameter of the compress device post placement is 18.7 mm at 0°, 18.7 mm at 45°, 19.5 mm at 90°, and 19.1 mm at 135°. The device was visualized in the left atrial appendage with 3D imaging   Right Atrium: The right atrium is normal in size.  Pericardium: There is no evidence of pericardial effusion.  Mitral Valve: Trace mitral valve regurgitation is seen.  Tricuspid Valve: Structurally normal tricuspid valve, with normal leaflet excursion. Trivial tricuspid regurgitation is visualized.  Aortic Valve: Normal trileaflet aortic valve with normal opening. The aortic valve is trileaflet. No evidence of aortic valve regurgitation is seen.  Pulmonic Valve: Structurally normal pulmonic valve, with normal leaflet excursion. Trace pulmonic valve regurgitation.  Aorta: The aortic root is normal in size and structure.  Pulmonary Artery: The main pulmonary artery is normal in size. Normal pulmonary artery pressure.  Venous: The pulmonary veins appear normal.    STRESS:    CATH: 2/17/2022  HEMODYNAMICS: Hemodynamic assessment demonstrates mildly elevated LVEDP: 17 mm hg.  VENTRICLES: There were no left ventricular global or regional wall motion abnormalities. Global left ventricular function was hypercontractile. EF estimated was 80 %.  VALVES: AORTIC VALVE: The aortic valve was evaluated by left ventriculography. The aortic valve appeared to be structurally normal. The aortic valve leaflets exhibited normal thickness and normal excursion. There was no aortic stenosis. MITRAL VALVE: The mitral valve was evaluated by left ventriculography. The mitral valve appeared grossly normal. The mitral leaflets exhibited normal thickness and normal excursion. The mitral valve exhibited no regurgitation.  CORONARY VESSELS: The coronary circulation is right dominant. There was no angiographic evidence for occlusive coronary artery disease.  LM:     --  LM: Normal. The vessel was normal sized, not calcified, and not tortuous. Angiography showed no evidence of disease.  LAD:     --  Proximal LAD: Angiography showed mild atherosclerosis with no flow limiting lesions.  --  Mid LAD: Normal.  --  Distal LAD: Normal.  --  D1: The vessel was small to medium sized. There was a 50 % stenosis in the middle third of the vessel segment.  CX:     --  Ostial circumflex: There was a 40 % stenosis.  --  Proximal circumflex: The vessel was small to medium sized. Angiography showed minor luminal irregularities with no flow limiting lesions.  --  Distal circumflex: The vessel was small sized. Angiography showed no evidence of disease.  --  OM1: Angiography showed minor luminal irregularities with no flow limiting lesions.  RCA:     --  RCA: Normal. The vessel was normal sized, not calcified, and not tortuous. Angiography showed minor luminal irregularities with no flow limiting lesions.    ELECTROPHYSIOLOGY:       PAST MEDICAL HISTORY  Hypertension  Hyperlipidemia, unspecified hyperlipidemia type  Chronic atrial fibrillation  History of BPH  History of COPD  BRANDIN (obstructive sleep apnea)  Atherosclerosis of both carotid arteries  CVA (cerebral vascular accident)  Bradycardia    PAST SURGICAL HISTORY  S/P appendectomy  Presence of Watchman left atrial appendage closure device    SOCIAL HISTORY:  Denies smoking/alcohol/drugs  MARITAL: , lives with daughter  CIGARETTES: Former 1.5 ppd smoker for 30 years, quit 15 years ago  ALCOHOL: 3-4 cans of beer daily  DRUGS: Denies  CAFFEINE: Occasional coffee.    FAMILY HISTORY:  Family history of diabetes mellitus (Mother)    Family History of Cardiovascular Disease:  Yes [  ] No [X]  Coronary Artery Disease in first degree relative: Yes [  ] No [X]  Sudden Cardiac Death in First degree relative: Yes [  ] No [X]    HOME MEDICATIONS:  Plavix 75 mg daily    CURRENT MEDICATIONS:    ALLERGIES: No Known Allergies    REVIEW OF SYMPTOMS:   CONSTITUTIONAL: No fever, no chills, no weight loss, no weight gain, no fatigue   ENMT:  No vertigo; No sinus or throat pain  NECK: No pain or stiffness  CARDIOVASCULAR: No chest pain, + exertional dyspnea, + syncope, + near syncope, no palpitations, no dizziness, no Orthopnea, no Paroxsymal nocturnal dyspnea  RESPIRATORY: no Shortness of breath, no cough, no wheezing  : No dysuria, no hematuria   GI: No dark color stool, no nausea, no diarrhea, no constipation, no abdominal pain   NEURO: No headache, no slurred speech   MUSCULOSKELETAL: No joint pain or swelling; No muscle, back, or extremity pain  PSYCH: No agitation, no anxiety.    ALL OTHER REVIEW OF SYSTEMS ARE NEGATIVE.    Vital Signs Last 24 Hrs  T(C): 36.7 (16 Sep 2023 12:52), Max: 36.7 (16 Sep 2023 12:52)  T(F): 98.1 (16 Sep 2023 12:52), Max: 98.1 (16 Sep 2023 12:52)  HR: 82 (16 Sep 2023 12:52) (82 - 82)  BP: 120/79 (16 Sep 2023 12:52) (120/79 - 120/79)  RR: 16 (16 Sep 2023 12:52) (16 - 16)  SpO2: 92% (16 Sep 2023 12:52) (92% - 92%)    Parameters below as of 16 Sep 2023 12:52  Patient On (Oxygen Delivery Method): room air    INTAKE AND OUTPUT:     PHYSICAL EXAM:  Constitutional: Comfortable . No acute distress.   HEENT: Atraumatic and normocephalic , neck is supple . no JVD. No carotid bruit.  CNS: A&Ox3. No focal deficits.   Respiratory: CTAB, unlabored   Cardiovascular: RRR normal s1 s2. No murmur. No rubs or gallop.  Gastrointestinal: Soft, non-tender. +Bowel sounds.   MSK: full ROM extremities x 4  Extremities: Radial: Right: 2+, Left: 1+, DP: Right: 2+, Left: 2+, no edema  Psychiatric: Calm . no agitation.   Skin: Warm and dry, no ulcers on extremities     LABS:                        14.1   8.77  )-----------( 152      ( 16 Sep 2023 13:44 )             40.7     09-16    134  |  99  |  12.8  ----------------------------<  138  3.8   |  21.0  |  0.91    Ca    8.6      16 Sep 2023 13:44  Mg     1.6     09-16    TPro  6.6  /  Alb  3.8  /  TBili  0.6  /  DBili  x   /  AST  28  /  ALT  19  /  AlkPhos  60  09-16    CARDIAC MARKERS ( 16 Sep 2023 13:44 ): <0.01 ng/mL        Urinalysis Basic - ( 16 Sep 2023 13:44 )  Color: x / Appearance: x / SG: x / pH: x  Gluc: 138 mg/dL / Ketone: x  / Bili: x / Urobili: x   Blood: x / Protein: x / Nitrite: x   Leuk Esterase: x / RBC: x / WBC x   Sq Epi: x / Non Sq Epi: x / Bacteria: x      INTERPRETATION OF TELEMETRY: AF    ECG:   Prior ECG: Yes [  ] No [  ]      RADIOLOGY & ADDITIONAL STUDIES:    X-ray:  reviewed by me.   CT scan:   MRI:   US:                                                [facility]                                              CARDIOLOGY AT [address], [place] [address]                                             Telephone: [phone]. Fax:[phone]                                                         CARDIOLOGY CONSULTATION NOTE                                                                                             Consult requested by: Dr. Garrido  History obtained by: Patient and medical record  Community Cardiologist: Dr. Dorcas Paulson, Dr. Marly Franklin   obtained: Yes [  ] No [X]  Reason for Consultation: Syncope  Available out pt records reviewed: Yes [X] No [  ]    Chief complaint:    Patient is a 72y old  Male who presents with a chief complaint of syncope.    HPI: 71y/o male former 45 pack/year smoker with history of PAF, S/P Watchman procedure, nonobstructive CAD, HTN, mixed HLD, CVA, COPD, BRANDIN (uses oral appliance) B/L carotid atherosclerosis, and bradycardia who was walking into house from a car, and collapsed with some head trauma. He states that he did not remember any precipitating symptoms. He has been having syncopal episodes for the past few months, but normally feels lightheaded and would lower himself to a sitting position. The last episode prior to this one was one week ago. He denies any chest pain, palpitations, orthopnea or PND. He does admit to some SANTOS when climbing one flight of stairs. He had a B/L carotid doppler in 2021 which showed moderate atherosclerosis without evidence of hemodynamically significant stenosis of the right carotid artery and mild atherosclerosis without evidence of hemodynamically significant stenosis of the left carotid artery.    CARDIAC TESTING   CINTIA: 3/30/2023  Left Ventricle: Left ventricular ejection fraction, by visual estimation, is 55 to 60%.  Right Ventricle: Normal right ventricular size and function.  Left Atrium: The left atrium is normal in size. POST-WATCHMAN CINTIA: S/p WATCHMAN procedure. The device appears well-seated. No color Doppler flow with vena contracta approximately 0.1 cm is seen around the device. The maximum diameter of the compress device post placement is 18.7 mm at 0°, 18.7 mm at 45°, 19.5 mm at 90°, and 19.1 mm at 135°. The device was visualized in the left atrial appendage with 3D imaging   Right Atrium: The right atrium is normal in size.  Pericardium: There is no evidence of pericardial effusion.  Mitral Valve: Trace mitral valve regurgitation is seen.  Tricuspid Valve: Structurally normal tricuspid valve, with normal leaflet excursion. Trivial tricuspid regurgitation is visualized.  Aortic Valve: Normal trileaflet aortic valve with normal opening. The aortic valve is trileaflet. No evidence of aortic valve regurgitation is seen.  Pulmonic Valve: Structurally normal pulmonic valve, with normal leaflet excursion. Trace pulmonic valve regurgitation.  Aorta: The aortic root is normal in size and structure.  Pulmonary Artery: The main pulmonary artery is normal in size. Normal pulmonary artery pressure.  Venous: The pulmonary veins appear normal.    STRESS:    CATH: 2/17/2022  HEMODYNAMICS: Hemodynamic assessment demonstrates mildly elevated LVEDP: 17 mm hg.  VENTRICLES: There were no left ventricular global or regional wall motion abnormalities. Global left ventricular function was hypercontractile. EF estimated was 80 %.  VALVES: AORTIC VALVE: The aortic valve was evaluated by left ventriculography. The aortic valve appeared to be structurally normal. The aortic valve leaflets exhibited normal thickness and normal excursion. There was no aortic stenosis. MITRAL VALVE: The mitral valve was evaluated by left ventriculography. The mitral valve appeared grossly normal. The mitral leaflets exhibited normal thickness and normal excursion. The mitral valve exhibited no regurgitation.  CORONARY VESSELS: The coronary circulation is right dominant. There was no angiographic evidence for occlusive coronary artery disease.  LM:     --  LM: Normal. The vessel was normal sized, not calcified, and not tortuous. Angiography showed no evidence of disease.  LAD:     --  Proximal LAD: Angiography showed mild atherosclerosis with no flow limiting lesions.  --  Mid LAD: Normal.  --  Distal LAD: Normal.  --  D1: The vessel was small to medium sized. There was a 50 % stenosis in the middle third of the vessel segment.  CX:     --  Ostial circumflex: There was a 40 % stenosis.  --  Proximal circumflex: The vessel was small to medium sized. Angiography showed minor luminal irregularities with no flow limiting lesions.  --  Distal circumflex: The vessel was small sized. Angiography showed no evidence of disease.  --  OM1: Angiography showed minor luminal irregularities with no flow limiting lesions.  RCA:     --  RCA: Normal. The vessel was normal sized, not calcified, and not tortuous. Angiography showed minor luminal irregularities with no flow limiting lesions.    ELECTROPHYSIOLOGY:       PAST MEDICAL HISTORY  Hypertension  Hyperlipidemia, unspecified hyperlipidemia type  Chronic atrial fibrillation  History of BPH  History of COPD  BRANDIN (obstructive sleep apnea)  Atherosclerosis of both carotid arteries  CVA (cerebral vascular accident)  Bradycardia    PAST SURGICAL HISTORY  S/P appendectomy  Presence of Watchman left atrial appendage closure device    SOCIAL HISTORY:  Denies smoking/alcohol/drugs  MARITAL: , lives with daughter  CIGARETTES: Former 1.5 ppd smoker for 30 years, quit 15 years ago  ALCOHOL: 3-4 cans of beer daily  DRUGS: Denies  CAFFEINE: Occasional coffee.    FAMILY HISTORY:  Family history of diabetes mellitus (Mother)    Family History of Cardiovascular Disease:  Yes [  ] No [X]  Coronary Artery Disease in first degree relative: Yes [  ] No [X]  Sudden Cardiac Death in First degree relative: Yes [  ] No [X]    HOME MEDICATIONS:  Plavix 75 mg daily    CURRENT MEDICATIONS:    ALLERGIES: No Known Allergies    REVIEW OF SYMPTOMS:   CONSTITUTIONAL: No fever, no chills, no weight loss, no weight gain, no fatigue   ENMT:  No vertigo; No sinus or throat pain  NECK: No pain or stiffness  CARDIOVASCULAR: No chest pain, + exertional dyspnea, + syncope, + near syncope, no palpitations, no dizziness, no Orthopnea, no Paroxsymal nocturnal dyspnea  RESPIRATORY: no Shortness of breath, no cough, no wheezing  : No dysuria, no hematuria   GI: No dark color stool, no nausea, no diarrhea, no constipation, no abdominal pain   NEURO: No headache, no slurred speech   MUSCULOSKELETAL: No joint pain or swelling; No muscle, back, or extremity pain  PSYCH: No agitation, no anxiety.    ALL OTHER REVIEW OF SYSTEMS ARE NEGATIVE.    Vital Signs Last 24 Hrs  T(C): 36.7 (16 Sep 2023 12:52), Max: 36.7 (16 Sep 2023 12:52)  T(F): 98.1 (16 Sep 2023 12:52), Max: 98.1 (16 Sep 2023 12:52)  HR: 82 (16 Sep 2023 12:52) (82 - 82)  BP: 120/79 (16 Sep 2023 12:52) (120/79 - 120/79)  RR: 16 (16 Sep 2023 12:52) (16 - 16)  SpO2: 92% (16 Sep 2023 12:52) (92% - 92%)    Parameters below as of 16 Sep 2023 12:52  Patient On (Oxygen Delivery Method): room air    INTAKE AND OUTPUT:     PHYSICAL EXAM:  Constitutional: Comfortable . No acute distress.   HEENT: Atraumatic and normocephalic , neck is supple . no JVD. No carotid bruit.  CNS: A&Ox3. No focal deficits.   Respiratory: CTAB, unlabored   Cardiovascular: RRR normal s1 s2. No murmur. No rubs or gallop.  Gastrointestinal: Soft, non-tender. +Bowel sounds.   MSK: full ROM extremities x 4  Extremities: Radial: Right: 2+, Left: 1+, DP: Right: 2+, Left: 2+, no edema  Psychiatric: Calm . no agitation.   Skin: Warm and dry, no ulcers on extremities     LABS:                        14.1   8.77  )-----------( 152      ( 16 Sep 2023 13:44 )             40.7     09-16    134  |  99  |  12.8  ----------------------------<  138  3.8   |  21.0  |  0.91    Ca    8.6      16 Sep 2023 13:44  Mg     1.6     09-16    TPro  6.6  /  Alb  3.8  /  TBili  0.6  /  DBili  x   /  AST  28  /  ALT  19  /  AlkPhos  60  09-16    CARDIAC MARKERS ( 16 Sep 2023 13:44 ): <0.01 ng/mL        Urinalysis Basic - ( 16 Sep 2023 13:44 )  Color: x / Appearance: x / SG: x / pH: x  Gluc: 138 mg/dL / Ketone: x  / Bili: x / Urobili: x   Blood: x / Protein: x / Nitrite: x   Leuk Esterase: x / RBC: x / WBC x   Sq Epi: x / Non Sq Epi: x / Bacteria: x      INTERPRETATION OF TELEMETRY: AF    ECG: AF  Prior ECG: Yes [  ] No [  ]      RADIOLOGY & ADDITIONAL STUDIES:    X-ray:  reviewed by me.   CT scan:   MRI:   US:

## 2023-09-16 NOTE — ED ADULT TRIAGE NOTE - CHIEF COMPLAINT QUOTE
Pt A&Ox4 states "I passed out and hit my head." BIBA c/o fell and hit head on chimney outside, with + 2 min LOC. Patient fell yesterday but was not seen for it. Pt has hx of A.fib

## 2023-09-16 NOTE — CONSULT NOTE ADULT - PROBLEM SELECTOR RECOMMENDATION 3
GDMT:        DAPT: Continue Plavix 75 mg daily and aspirin 81 mg daily       Statin: Continue Lipitor 10 mg daily and will adjust based on lipid panel.       Beta Blocker: Continue Toprol 50 mg daily       Other Antianginals: N/A       Aggressive cardiovascular risk reduction and lifestyle modification.  Cardiac rehab info provided/referral and communication to cardiac rehab completed

## 2023-09-16 NOTE — CONSULT NOTE ADULT - PROBLEM SELECTOR RECOMMENDATION 6
AF Screening: Known AF, (S/P Watchman Procedure)  Cardioembolic Screening: Previous CINTIA's  Antiplatelet/Anticoagulation: Continue Plavix 75 mg daily and aspirin 81 mg daily  Statin: Continue Lipitor 10 mg daily and will adjust based on lipid panel.

## 2023-09-16 NOTE — ED PROVIDER NOTE - PATIENT PORTAL LINK FT
You can access the FollowMyHealth Patient Portal offered by [facility] by registering at the following website: [email or web address]. By joining [email or web address]’s FollowMyHealth portal, you will also be able to view your health information using other applications (apps) compatible with our system.

## 2023-09-16 NOTE — ED ADULT TRIAGE NOTE - PRO INTERPRETER NEED 2
Last office visit: 12/15/22    Per note from 12/15/22: Continue Gabapentin 900mg qhs    PDMP reviewed:    Gabapentin  600MG / Tablet  Rx# [id number] Other Qty: 135  Days: 90  Refills: 0 Prescribed: 1/24/2023  Dispensed: 1/24/2023  Sold: 1/26/2023 DEBBY NEWTON  [address]  [facility] [address] [id number]  [address]  [facility] [address]       Last fill date: 01/26/23  # dispensed: 135, 0   Due for refill: yes  Next scheduled OV:  Return in about 6 weeks (around 1/26/2023).    Refill sent to preferred pharmacy.     Writer left message on patient preferred line.  A 30 day supply provided today, but for future refills patient is required to schedule follow up visit.    English

## 2023-09-16 NOTE — ED PROVIDER NOTE - CLINICAL SUMMARY MEDICAL DECISION MAKING FREE TEXT BOX
72yM with pmh HTN, HLD, COPD (no home O2), BRANDIN, pAfib (no AC), CAD on plavix BIBA for fall with head trauma. Patient reports syncope prior to fall. No pre-syncopal symptoms. Currently without symptoms at this time. Noted to have ecchymosis to right lateral/inferior orbital ring. EOMI are intact. Neuro exam is non-focal. CT head without acute intracranial pathology. Will obtain CT max/face, EKG, and labs. [facility] consulted for syncope.

## 2023-09-16 NOTE — CONSULT NOTE ADULT - PROBLEM SELECTOR RECOMMENDATION 2
CHADS2-Vasc: 5 for age, HTN, CVA, and vascular disease.  Anticoagulation: None (S/P Watchman Procedure)  Rate/Rhythm Control: Continue Toprol 50 mg daily, rate controlled.

## 2023-09-16 NOTE — CONSULT NOTE ADULT - PROBLEM SELECTOR PROBLEM 3
Coronary artery disease involving native coronary artery of native heart, unspecified whether angina

## 2023-09-16 NOTE — ED ADULT NURSE REASSESSMENT NOTE - NS ED NURSE REASSESS COMMENT FT1
Report received from Ama YANG RN, pt in no apparent distress, AOX4 Report received from Ama YANG RN at 1800, pt in no apparent distress, AOX4

## 2023-09-16 NOTE — ED PROVIDER NOTE - OBJECTIVE STATEMENT
72yM with pmh HTN, HLD, COPD (no home O2), BRANDIN, Afib (no AC), CAD on plavix BIBA for fall with head trauma. Patient reports that he was walking in his home when  he "passed out". According to family, they found him with his back against the wall. +LOC for about 2 minutes after which he was back to baseline. Denies chest pain, dyspnea, abd pain/N/V prior to fall. Currently without complaints at this time.

## 2023-09-16 NOTE — CONSULT NOTE ADULT - PROBLEM SELECTOR RECOMMENDATION 9
EP consult, possible ILR  B/L carotid dopplers  Telemetry Likely vasovagal, h/o Orthostatic hypotension:  Check orthostatic BP and vitals,   Pt is taking terazosin and tamsulosin; advised o d/w urologist if dose can be reduced  Advised to take meds at night  Avoid dehydration  Compression stocking  Avoid ETOH d/w pt

## 2023-09-16 NOTE — CONSULT NOTE ADULT - ASSESSMENT
73y/o male former 45 pack/year smoker with history of PAF, S/P Watchman procedure, nonobstructive CAD, HTN, mixed HLD, CVA, COPD, BRANDIN (uses oral appliance) B/L carotid atherosclerosis, and bradycardia who was walking into house from a car, and collapsed with some head trauma. He states that he did not remember any precipitating symptoms. He has been having syncopal episodes for the past few months, but normally feels lightheaded and would lower himself to a sitting position. The last episode prior to this one was one week ago. He denies any chest pain, palpitations, orthopnea or PND. He does admit to some SANTOS when climbing one flight of stairs. He had a B/L carotid doppler in 2021 which showed moderate atherosclerosis without evidence of hemodynamically significant stenosis of the right carotid artery and mild atherosclerosis without evidence of hemodynamically significant stenosis of the left carotid artery.

## 2023-09-16 NOTE — CONSULT NOTE ADULT - NS ATTEND AMEND GEN_ALL_CORE FT
72M former smoker, ETOH abuse (Drink 4 cans beer daily) HTN, HLD, CVA, COPD, Carotid atherosclerosis, BRANDIN uses oral appliances, AF s/p Watchman s/p 3/2022 with cardiac GARCIA (CINTIA 3/2023 LVEF 55-60. Watchman is well sated no leak. No sign valvular abnl; NST 2/9/2021 Normal Study; [facility] subs to Coronary calcification and symptoms 2/2022 LM nl, LAD MLI, D1 50, Cx ostial 40, RCA Normal, LVEF 80%) Lightheadedness, Dizziness; Orthostatic Hypotension presented to ER with syncopal episode, BIBA c/o fell and hit head on chimney outside, with + 2 min LOC. Patient fell yesterday but was not seen for it. Pt states he had at about multiple episodes of syncope over the last  Premonitory symptoms    CURRENT CARDIO MEDS  Plavix  ASA  Atorvastatin 10 mg daily  Metoprolol suc 25 m daily  Terazosin 5 mg daily  Flomax 0.4  mg daily    1) Syncope: Likely vasovagal. Pt hit head. Brain CT: NAP.   EKG EF controlled VR, NSST T abnl. Trp negative,  Consider Neurology evaluation through Primary Team.  2) h/o Orthostatic hypotension:  Check orthostatic BP and vitals,   Pt is taking terazosin and tamsulosin; advised o d/w urologist if dose can be reduced  Advised to take meds at night  Avoid dehydration  Compression stocking  Life style modification d/w pt  Diet d/w pt  Avoid ETOH d/w pt  will switch to night time and compression stocking d/w urologist reduce meds  3) OP FU with Cardiology in 1 week d/w pt; OP MCOT d/w pt 72M former smoker, ETOH abuse (Drink 4 big cans of beer daily) HTN, HLD, CVA, COPD, Carotid atherosclerosis, BRANDIN uses oral appliances, AF s/p Watchman s/p 3/2022 with cardiac GARCIA (CINTIA 3/2023 LVEF 55-60. Watchman is well sated no leak. No sign valvular abnl; NST 2/9/2021 Normal Study; LHC subsequent to Coronary calcification and symptoms 2/2022 LM nl, LAD MLI, D1 50, Cx ostial 40, RCA Normal, LVEF 80%) Lightheadedness, Dizziness, Orthostatic Hypotension presented to ER with syncopal episode, BIBA c/o fell and hit head on chimney outside, with + 2 min LOC. Patient fell yesterday but was not seen for it. Pt states he had at about multiple episodes of syncope over the last 2 years giving count of 20. He states he felt lightheaded and blurry and passes out. He gets up quickly but this time it lasted longer. He also had urinary loss this time. Because it lasted longer he came to ER. Denied dyspnea, orthopnea, pnd, edema, cp, palpitation, hematuria, melena,  Premonitory symptoms:    CURRENT CARDIO MEDS  Plavix  ASA  Atorvastatin 10 mg daily  Metoprolol suc 25 m daily  Terazosin 5 mg daily  Flomax 0.4  mg daily    1) Syncope: Likely vasovagal. Pt hit head. Brain CT: NAP.   EKG AF controlled VR, NSST T abnl. Trp negative,  Consider Neurology evaluation through Primary Team.    2) h/o Orthostatic hypotension:  Check orthostatic BP and vitals,   Pt is taking terazosin and tamsulosin; advised o d/w urologist if dose can be reduced  Advised to take meds at night  Avoid dehydration  Compression stocking  Life style modification d/w pt  Diet d/w pt  Avoid ETOH d/w pt  will switch to night time and compression stocking d/w urologist reduce meds    3) OP FU with Cardiology in 1 week d/w pt; OP MCOT d/w pt -    72M former smoker, ETOH abuse (Drink 4 big cans of beer daily) HTN, HLD, CVA, COPD, Carotid atherosclerosis, BRANDIN uses oral appliances, AF s/p Watchman s/p 3/2022 with cardiac GARCIA (CINTIA 3/2023 LVEF 55-60. Watchman is well sated no leak. No sign valvular abnl; NST 2/9/2021 Normal Study; LHC subsequent to Coronary calcification and symptoms 2/2022 LM nl, LAD MLI, D1 50, Cx ostial 40, RCA Normal, LVEF 80%) Lightheadedness, Dizziness, Orthostatic Hypotension presented to ER with syncopal episode, BIBA c/o fell and hit head on chimney outside, with + 2 min LOC. Patient fell yesterday but was not seen for it. Pt states he had at about multiple episodes of syncope over the last 2 years giving count of 20. He states he felt lightheaded and blurry and passes out. He gets up quickly but this time it lasted longer. He also had urinary loss this time. Because it lasted longer he came to ER. Denied dyspnea, orthopnea, pnd, edema, cp, palpitation, hematuria, melena,  Premonitory symptoms:    CURRENT CARDIO MEDS  Plavix  ASA  Atorvastatin 10 mg daily  Metoprolol suc 25 m daily  Terazosin 5 mg daily  Flomax 0.4  mg daily    1) Syncope: Likely vasovagal. Pt hit head. Brain CT: NAP.   EKG AF controlled VR, NSST T abnl. Trp negative,  Consider Neurology evaluation through Primary Team.    2) h/o Orthostatic hypotension:  Check orthostatic BP and vitals,   Pt is taking terazosin and tamsulosin; advised o d/w urologist if dose can be reduced  Advised to take meds at night  Avoid dehydration  Compression stocking  Life style modification d/w pt  Diet d/w pt  Avoid ETOH d/w pt  will switch to night time and compression stocking d/w urologist reduce meds    3) OP FU with Cardiology in 1 week d/w pt; OP MCOT d/w pt

## 2023-09-16 NOTE — ED ADULT NURSE NOTE - NSFALLUNIVINTERV_ED_ALL_ED
Bed/Stretcher in lowest position, wheels locked, appropriate side rails in place/Call bell, personal items and telephone in reach/Instruct patient to call for assistance before getting out of bed/chair/stretcher/Non-slip footwear applied when patient is off stretcher/[place] to call system/Physically safe environment - no spills, clutter or unnecessary equipment/Purposeful proactive rounding/Room/bathroom lighting operational, light cord in reach

## 2023-09-16 NOTE — ED ADULT NURSE REASSESSMENT NOTE - NS ED NURSE REASSESS COMMENT FT1
Assumed care of patient at this time, patient is awake, calm, RR even and unlabored, denies pain, says " I fell earlier today and that's why I have this bruise on my right side of face", bruise noted to right side of face. Denies chest pain, waiting lab result.

## 2023-09-16 NOTE — ED PROVIDER NOTE - PHYSICAL EXAMINATION
Gen: no acute distress  Head: normocephalic, +ecchymosis to inferior/lateral orbital ring  EENT: EOMI, PERRLA  Lung: no increased work of breathing, CTABL, no wheezing, rales, rhonchi  CV: normal s1/s2,  2+ radial pulses b/l, no LE edema  Abd: soft, non-tender, non-distended, no rebound tenderness or guarding  MSK: no visible deformities, full range of motion in all 4 extremities  Neuro: A&Ox4; CNII-XII intact, UE: 5/5 strength throughout b/l, LE: 5/5 strength throughout b/l, general sensation intact b/l, no dysmetria on FTN

## 2023-09-16 NOTE — CONSULT NOTE ADULT - PROBLEM SELECTOR RECOMMENDATION 5
Goal Non-HDL < 100, LDL < 70  Lipid Panel: In AM  Statin: Continue Lipitor 10 mg daily and will adjust based on lipid panel.  Other: Continue fenofibrate 160 mg daily

## 2023-10-06 PROBLEM — R00.1 BRADYCARDIA, UNSPECIFIED: Chronic | Status: ACTIVE | Noted: 2023-09-16

## 2023-10-06 PROBLEM — I63.9 CEREBRAL INFARCTION, UNSPECIFIED: Chronic | Status: ACTIVE | Noted: 2023-09-16

## 2023-10-06 PROBLEM — I65.23 OCCLUSION AND STENOSIS OF BILATERAL CAROTID ARTERIES: Chronic | Status: ACTIVE | Noted: 2023-09-16

## 2023-12-08 ENCOUNTER — APPOINTMENT (OUTPATIENT)
Dept: CARDIOLOGY | Facility: CLINIC | Age: 73
End: 2023-12-08
Payer: MEDICARE

## 2023-12-08 VITALS
HEART RATE: 70 BPM | TEMPERATURE: 97.8 F | HEIGHT: 71 IN | WEIGHT: 217 LBS | DIASTOLIC BLOOD PRESSURE: 78 MMHG | SYSTOLIC BLOOD PRESSURE: 136 MMHG | BODY MASS INDEX: 30.38 KG/M2 | OXYGEN SATURATION: 95 %

## 2023-12-08 DIAGNOSIS — I10 ESSENTIAL (PRIMARY) HYPERTENSION: ICD-10-CM

## 2023-12-08 DIAGNOSIS — E78.5 HYPERLIPIDEMIA, UNSPECIFIED: ICD-10-CM

## 2023-12-08 DIAGNOSIS — E66.9 OBESITY, UNSPECIFIED: ICD-10-CM

## 2023-12-08 DIAGNOSIS — R94.31 ABNORMAL ELECTROCARDIOGRAM [ECG] [EKG]: ICD-10-CM

## 2023-12-08 DIAGNOSIS — G47.33 OBSTRUCTIVE SLEEP APNEA (ADULT) (PEDIATRIC): ICD-10-CM

## 2023-12-08 DIAGNOSIS — Z13.6 ENCOUNTER FOR SCREENING FOR CARDIOVASCULAR DISORDERS: ICD-10-CM

## 2023-12-08 DIAGNOSIS — I65.23 OCCLUSION AND STENOSIS OF BILATERAL CAROTID ARTERIES: ICD-10-CM

## 2023-12-08 DIAGNOSIS — I63.9 CEREBRAL INFARCTION, UNSPECIFIED: ICD-10-CM

## 2023-12-08 DIAGNOSIS — I48.0 PAROXYSMAL ATRIAL FIBRILLATION: ICD-10-CM

## 2023-12-08 DIAGNOSIS — R07.89 OTHER CHEST PAIN: ICD-10-CM

## 2023-12-08 PROCEDURE — 93000 ELECTROCARDIOGRAM COMPLETE: CPT

## 2023-12-08 PROCEDURE — 99215 OFFICE O/P EST HI 40 MIN: CPT

## 2024-01-12 ENCOUNTER — APPOINTMENT (OUTPATIENT)
Dept: PULMONOLOGY | Facility: CLINIC | Age: 74
End: 2024-01-12
Payer: MEDICARE

## 2024-01-12 VITALS — SYSTOLIC BLOOD PRESSURE: 120 MMHG | DIASTOLIC BLOOD PRESSURE: 70 MMHG

## 2024-01-12 VITALS — BODY MASS INDEX: 30.8 KG/M2 | WEIGHT: 220 LBS | HEIGHT: 71 IN

## 2024-01-12 VITALS — RESPIRATION RATE: 16 BRPM | HEART RATE: 62 BPM | OXYGEN SATURATION: 97 %

## 2024-01-12 PROCEDURE — 99215 OFFICE O/P EST HI 40 MIN: CPT

## 2024-01-12 PROCEDURE — G2212 PROLONG OUTPT/OFFICE VIS: CPT

## 2024-01-12 RX ORDER — ALBUTEROL SULFATE 90 UG/1
108 (90 BASE) INHALANT RESPIRATORY (INHALATION)
Qty: 1 | Refills: 5 | Status: ACTIVE | COMMUNITY
Start: 2024-01-12 | End: 1900-01-01

## 2024-01-12 RX ORDER — IPRATROPIUM BROMIDE AND ALBUTEROL SULFATE 2.5; .5 MG/3ML; MG/3ML
0.5-2.5 (3) SOLUTION RESPIRATORY (INHALATION) 4 TIMES DAILY
Qty: 120 | Refills: 3 | Status: ACTIVE | COMMUNITY
Start: 2024-01-12 | End: 1900-01-01

## 2024-01-12 NOTE — DISCUSSION/SUMMARY
[FreeTextEntry1] : COPD by hx, heavy smoker, smoke free x 14 yrs Interstitial lung abnormalities on CT scan 12/23 ( ALANA), predominately RLL- no current exposures or hx CT Dz, ? nocturnal aspiration, also 4mm nodule RLL Mild BRANDIN on oral appliance PAF, post Watchman, echo with normal LV/RV, has PFO with L-R shunting Chronic exertional dyspnea- stable, no acute change in sputum Continue Trelegy, Neb use and rescue use, technique reviewed Nocturnal GERD precautions reviewed Will repeat overnight oximetry with oral appliance Needs baseline spirometry at f/u Vaccinations up to date by hx Will follow CT scan 3 months or sosoner if needed

## 2024-01-12 NOTE — HISTORY OF PRESENT ILLNESS
[Former] : former [>= 20 pack years] : >= 20 pack years [TextBox_4] : heavy smoker, hx COPD smoke free x 14 yrs uses Trelegy 100 daily chronic sputum, no change no fever, chill, chest pain has home nebulizer uses nicotine replacement Chronic dyspnea Hx PAF, had watchman secondary to falls, has balance issues- Saw Neurology worked as  has dog- not allergic Mild BRANDIN ( AHI 14) , uses oral appliance [YearQuit] : 2010

## 2024-03-07 ENCOUNTER — APPOINTMENT (OUTPATIENT)
Dept: CARDIOLOGY | Facility: CLINIC | Age: 74
End: 2024-03-07
Payer: MEDICARE

## 2024-03-07 PROCEDURE — 78452 HT MUSCLE IMAGE SPECT MULT: CPT

## 2024-03-07 PROCEDURE — 93015 CV STRESS TEST SUPVJ I&R: CPT

## 2024-03-07 PROCEDURE — A9502: CPT

## 2024-03-07 PROCEDURE — 93306 TTE W/DOPPLER COMPLETE: CPT

## 2024-03-14 ENCOUNTER — TRANSCRIPTION ENCOUNTER (OUTPATIENT)
Age: 74
End: 2024-03-14

## 2024-03-26 NOTE — PHYSICAL THERAPY INITIAL EVALUATION ADULT - SITTING BALANCE: DYNAMIC
Quality 130: Documentation Of Current Medications In The Medical Record: Current Medications Documented Quality 226: Preventive Care And Screening: Tobacco Use: Screening And Cessation Intervention: Patient screened for tobacco use and is an ex/non-smoker Quality 431: Preventive Care And Screening: Unhealthy Alcohol Use - Screening: Patient not identified as an unhealthy alcohol user when screened for unhealthy alcohol use using a systematic screening method Detail Level: Detailed good balance

## 2024-04-17 ENCOUNTER — APPOINTMENT (OUTPATIENT)
Dept: PULMONOLOGY | Facility: CLINIC | Age: 74
End: 2024-04-17
Payer: MEDICARE

## 2024-04-17 VITALS — HEIGHT: 69 IN | BODY MASS INDEX: 30.07 KG/M2 | WEIGHT: 203 LBS

## 2024-04-17 VITALS
HEART RATE: 58 BPM | OXYGEN SATURATION: 96 % | SYSTOLIC BLOOD PRESSURE: 132 MMHG | RESPIRATION RATE: 16 BRPM | DIASTOLIC BLOOD PRESSURE: 74 MMHG

## 2024-04-17 DIAGNOSIS — R06.09 OTHER FORMS OF DYSPNEA: ICD-10-CM

## 2024-04-17 DIAGNOSIS — J44.9 CHRONIC OBSTRUCTIVE PULMONARY DISEASE, UNSPECIFIED: ICD-10-CM

## 2024-04-17 DIAGNOSIS — J84.9 INTERSTITIAL PULMONARY DISEASE, UNSPECIFIED: ICD-10-CM

## 2024-04-17 DIAGNOSIS — Z87.891 PERSONAL HISTORY OF NICOTINE DEPENDENCE: ICD-10-CM

## 2024-04-17 PROCEDURE — 94010 BREATHING CAPACITY TEST: CPT

## 2024-04-17 PROCEDURE — 99214 OFFICE O/P EST MOD 30 MIN: CPT | Mod: 25

## 2024-04-17 NOTE — DISCUSSION/SUMMARY
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 nocturnal aspiration, also 4mm nodule RLL Mild BRANDIN on oral appliance PAF, post Watchman, echo  3/24 with normal LV/RV, no sig PH, hx small PFO with shunting on CINTIA 4/22 Chronic exertional dyspnea- improved on nebulizer per pt, currently without any acute pulmonary complaints Spirometry today is normal Given clinical improvement by hx, will continue neb AM and Trelegy and re eval at follow up Nocturnal GERD precautions reviewed Will repeat overnight oximetry with oral appliance needs repeat  CT scan in June, denies any current exposure 6 months or sooner if needed, will call with scan and re eval sooner if needed

## 2024-04-17 NOTE — HISTORY OF PRESENT ILLNESS
[Former] : former [>= 20 pack years] : >= 20 pack years [TextBox_4] : heavy smoker, hx COPD smoke free x 14 yrs uses Trelegy 100 daily chronic sputum, no change no fever, chill, chest pain has home nebulizer uses nicotine replacement Chronic dyspnea Hx PAF, had watchman secondary to falls, has balance issues- Saw Neurology worked as  has dog- not allergic Mild BRANDIN ( AHI 14) , uses oral appliance  4/17/24 former  has dog no env exposure smoek free doing well, using neb daily and Trelegy [YearQuit] : 2010

## 2024-04-19 NOTE — H&P PST ADULT - CORNEAL ABRASION RISK
----- Message from Audrey Sepulveda RN sent at 4/19/2024 10:13 AM CDT -----  Called patient and updated on normal stress test results. He is out to breakfast right now and writer will call back in an hour or two to discuss BP values and recommendation to increase his medicine. -[facility] [place]      ----- Message -----  From: Ajay Argueta MD  Sent: 4/18/2024   3:07 PM CDT  To: Audrey Sepulveda RN    Let him know stress test looks good, nothing I am concerned about.  His blood pressure was elevated during the stress test however, he was to call in blood pressures to me and never did, given this, I would like to have us increase his diltiazem from 180 CD to 240 CD.  Lastly, he needed a prescription filled and could not remember what it was, could we arrange for a med rec over the phone.  LF   denies

## 2024-04-29 ENCOUNTER — OFFICE (OUTPATIENT)
Dept: URBAN - METROPOLITAN AREA CLINIC 113 | Facility: CLINIC | Age: 74
Setting detail: OPHTHALMOLOGY
End: 2024-04-29
Payer: MEDICARE

## 2024-04-29 DIAGNOSIS — H52.03: ICD-10-CM

## 2024-04-29 DIAGNOSIS — H25.13: ICD-10-CM

## 2024-04-29 DIAGNOSIS — H52.4: ICD-10-CM

## 2024-04-29 PROCEDURE — 92015 DETERMINE REFRACTIVE STATE: CPT | Performed by: OPTOMETRIST

## 2024-04-29 PROCEDURE — 92004 COMPRE OPH EXAM NEW PT 1/>: CPT | Performed by: OPTOMETRIST

## 2024-06-24 ENCOUNTER — NON-APPOINTMENT (OUTPATIENT)
Age: 74
End: 2024-06-24

## 2024-09-05 ENCOUNTER — RX RENEWAL (OUTPATIENT)
Age: 74
End: 2024-09-05

## 2024-09-09 ENCOUNTER — APPOINTMENT (OUTPATIENT)
Dept: PULMONOLOGY | Facility: CLINIC | Age: 74
End: 2024-09-09
Payer: MEDICARE

## 2024-09-09 VITALS
OXYGEN SATURATION: 97 % | DIASTOLIC BLOOD PRESSURE: 78 MMHG | RESPIRATION RATE: 16 BRPM | BODY MASS INDEX: 33.33 KG/M2 | WEIGHT: 225 LBS | HEART RATE: 67 BPM | HEIGHT: 69 IN | SYSTOLIC BLOOD PRESSURE: 136 MMHG

## 2024-09-09 DIAGNOSIS — J84.9 INTERSTITIAL PULMONARY DISEASE, UNSPECIFIED: ICD-10-CM

## 2024-09-09 DIAGNOSIS — G47.33 OBSTRUCTIVE SLEEP APNEA (ADULT) (PEDIATRIC): ICD-10-CM

## 2024-09-09 DIAGNOSIS — E66.9 OBESITY, UNSPECIFIED: ICD-10-CM

## 2024-09-09 DIAGNOSIS — J44.9 CHRONIC OBSTRUCTIVE PULMONARY DISEASE, UNSPECIFIED: ICD-10-CM

## 2024-09-09 PROCEDURE — 99214 OFFICE O/P EST MOD 30 MIN: CPT

## 2024-09-09 PROCEDURE — G2211 COMPLEX E/M VISIT ADD ON: CPT

## 2024-09-09 NOTE — HISTORY OF PRESENT ILLNESS
[Former] : former [>= 20 pack years] : >= 20 pack years [TextBox_4] : heavy smoker, hx COPD smoke free x 14 yrs uses Trelegy 100 daily chronic sputum, no change no fever, chill, chest pain has home nebulizer uses nicotine replacement Chronic dyspnea Hx PAF, had watchman secondary to falls, has balance issues- Saw Neurology worked as  has dog- not allergic Mild BRANDIN ( AHI 14) , uses oral appliance  9/9/24 former  has dog no env exposure smoke free doing well, using neb daily and Trelegy admits to sig ETOH use - beer [YearQuit] : 2010

## 2024-09-09 NOTE — CONSULT LETTER
[Dear  ___] : Dear  [unfilled], [Consult Letter:] : I had the pleasure of evaluating your patient, [unfilled]. [Please see my note below.] : Please see my note below. [Sincerely,] : Sincerely, [FreeTextEntry3] : Prosper Nova DO [facility] Director Pulmonary Division Medical Director Respiratory Therapy [facility]

## 2024-09-09 NOTE — DISCUSSION/SUMMARY
[FreeTextEntry1] : Interstitial lung abnormalities on CT scan 12/23 - stable latest 6/24  No acute change in status, ETOH abstinence discussed Favor nocturnal aspiration Mild ( AHI 14) BRANDIN on oral appliance, will repeat overnight oximetry with appliance PAF, post Watchman, echo  3/24 with normal LV/RV, no sig PH, hx small PFO with shunting on CINTIA 4/22 Chronic exertional dyspnea- improved on nebulizer No env exposures by hx, has dog Will obtain serologies Nocturnal GERD precautions stressed Repeat spirometry at follow up Continue Trelegy/nebs for now 3 months or sooner if needed, will call with above

## 2024-09-10 ENCOUNTER — LABORATORY RESULT (OUTPATIENT)
Age: 74
End: 2024-09-10

## 2024-09-11 LAB
CCP AB SER IA-ACNC: <8 U/ML
ERYTHROCYTE [SEDIMENTATION RATE] IN BLOOD BY WESTERGREN METHOD: 2 MM/HR
RF+CCP IGG SER-IMP: NEGATIVE
RHEUMATOID FACT SER QL: 20 IU/ML

## 2024-09-12 LAB
ANA SER IF-ACNC: NEGATIVE
DSDNA AB SER-ACNC: <1 IU/ML
ENA JO1 AB SER IA-ACNC: <0.2 AL
ENA SCL70 IGG SER IA-ACNC: <0.2 AL

## 2024-09-17 LAB
ALTERN TENCAPG(M6): 2 MCG/ML
ASPER FUMCAPG(M3): 11.2 MCG/ML
AUREOBASCAPG(M12): 4 MCG/ML
LACEYELLA SACCHARI IGG: 7.8 MCG/ML
MICROPOLYCAPG(M22): 2.1 MCG/ML
PENIC CHRYCAPG(M1): 10 MCG/ML
PHOMA BETAE IGG: <2 MCG/ML
TRICHODERMA VIRIDE IGG: 2.6 MCG/ML

## 2024-12-11 ENCOUNTER — APPOINTMENT (OUTPATIENT)
Dept: CARDIOLOGY | Facility: CLINIC | Age: 74
End: 2024-12-11
Payer: MEDICARE

## 2024-12-11 ENCOUNTER — NON-APPOINTMENT (OUTPATIENT)
Age: 74
End: 2024-12-11

## 2024-12-11 VITALS
OXYGEN SATURATION: 96 % | HEIGHT: 69 IN | HEART RATE: 57 BPM | DIASTOLIC BLOOD PRESSURE: 67 MMHG | BODY MASS INDEX: 34.07 KG/M2 | SYSTOLIC BLOOD PRESSURE: 119 MMHG | WEIGHT: 230 LBS

## 2024-12-11 DIAGNOSIS — Z00.00 ENCOUNTER FOR GENERAL ADULT MEDICAL EXAMINATION W/OUT ABNORMAL FINDINGS: ICD-10-CM

## 2024-12-11 DIAGNOSIS — R94.31 ABNORMAL ELECTROCARDIOGRAM [ECG] [EKG]: ICD-10-CM

## 2024-12-11 DIAGNOSIS — I10 ESSENTIAL (PRIMARY) HYPERTENSION: ICD-10-CM

## 2024-12-11 DIAGNOSIS — R06.09 OTHER FORMS OF DYSPNEA: ICD-10-CM

## 2024-12-11 DIAGNOSIS — G47.33 OBSTRUCTIVE SLEEP APNEA (ADULT) (PEDIATRIC): ICD-10-CM

## 2024-12-11 DIAGNOSIS — I63.9 CEREBRAL INFARCTION, UNSPECIFIED: ICD-10-CM

## 2024-12-11 DIAGNOSIS — J44.9 CHRONIC OBSTRUCTIVE PULMONARY DISEASE, UNSPECIFIED: ICD-10-CM

## 2024-12-11 DIAGNOSIS — I65.23 OCCLUSION AND STENOSIS OF BILATERAL CAROTID ARTERIES: ICD-10-CM

## 2024-12-11 DIAGNOSIS — I48.0 PAROXYSMAL ATRIAL FIBRILLATION: ICD-10-CM

## 2024-12-11 DIAGNOSIS — R07.89 OTHER CHEST PAIN: ICD-10-CM

## 2024-12-11 PROCEDURE — 99215 OFFICE O/P EST HI 40 MIN: CPT

## 2024-12-11 PROCEDURE — G2211 COMPLEX E/M VISIT ADD ON: CPT

## 2024-12-11 PROCEDURE — 93000 ELECTROCARDIOGRAM COMPLETE: CPT

## 2025-02-26 ENCOUNTER — NON-APPOINTMENT (OUTPATIENT)
Age: 75
End: 2025-02-26

## 2025-02-26 ENCOUNTER — APPOINTMENT (OUTPATIENT)
Dept: CARDIOLOGY | Facility: CLINIC | Age: 75
End: 2025-02-26
Payer: MEDICARE

## 2025-02-26 VITALS
DIASTOLIC BLOOD PRESSURE: 80 MMHG | HEART RATE: 60 BPM | WEIGHT: 203 LBS | SYSTOLIC BLOOD PRESSURE: 116 MMHG | BODY MASS INDEX: 30.07 KG/M2 | HEIGHT: 69 IN | OXYGEN SATURATION: 95 %

## 2025-02-26 VITALS — HEART RATE: 67 BPM | SYSTOLIC BLOOD PRESSURE: 96 MMHG | DIASTOLIC BLOOD PRESSURE: 77 MMHG

## 2025-02-26 DIAGNOSIS — R29.6 REPEATED FALLS: ICD-10-CM

## 2025-02-26 DIAGNOSIS — I48.0 PAROXYSMAL ATRIAL FIBRILLATION: ICD-10-CM

## 2025-02-26 DIAGNOSIS — R00.1 BRADYCARDIA, UNSPECIFIED: ICD-10-CM

## 2025-02-26 DIAGNOSIS — Z09 ENCOUNTER FOR FOLLOW-UP EXAMINATION AFTER COMPLETED TREATMENT FOR CONDITIONS OTHER THAN MALIGNANT NEOPLASM: ICD-10-CM

## 2025-02-26 DIAGNOSIS — I95.1 ORTHOSTATIC HYPOTENSION: ICD-10-CM

## 2025-02-26 PROCEDURE — 93000 ELECTROCARDIOGRAM COMPLETE: CPT

## 2025-02-26 PROCEDURE — 99215 OFFICE O/P EST HI 40 MIN: CPT

## 2025-02-26 RX ORDER — CLOPIDOGREL 75 MG/1
75 TABLET, FILM COATED ORAL
Refills: 0 | Status: ACTIVE | COMMUNITY

## 2025-02-26 RX ORDER — METOPROLOL SUCCINATE 25 MG/1
25 TABLET, EXTENDED RELEASE ORAL
Qty: 45 | Refills: 3 | Status: ACTIVE | COMMUNITY
Start: 1900-01-01 | End: 1900-01-01

## 2025-03-18 ENCOUNTER — APPOINTMENT (OUTPATIENT)
Dept: PULMONOLOGY | Facility: CLINIC | Age: 75
End: 2025-03-18
Payer: MEDICARE

## 2025-03-18 VITALS
BODY MASS INDEX: 29.62 KG/M2 | SYSTOLIC BLOOD PRESSURE: 116 MMHG | OXYGEN SATURATION: 97 % | RESPIRATION RATE: 16 BRPM | HEART RATE: 68 BPM | WEIGHT: 200 LBS | DIASTOLIC BLOOD PRESSURE: 74 MMHG | HEIGHT: 69 IN

## 2025-03-18 DIAGNOSIS — J84.9 INTERSTITIAL PULMONARY DISEASE, UNSPECIFIED: ICD-10-CM

## 2025-03-18 PROCEDURE — 94010 BREATHING CAPACITY TEST: CPT

## 2025-03-18 PROCEDURE — 99214 OFFICE O/P EST MOD 30 MIN: CPT | Mod: 25

## 2025-06-03 ENCOUNTER — OUTPATIENT (OUTPATIENT)
Dept: OUTPATIENT SERVICES | Facility: HOSPITAL | Age: 75
LOS: 1 days | End: 2025-06-03
Payer: COMMERCIAL

## 2025-06-03 ENCOUNTER — APPOINTMENT (OUTPATIENT)
Dept: CT IMAGING | Facility: CLINIC | Age: 75
End: 2025-06-03
Payer: MEDICARE

## 2025-06-03 DIAGNOSIS — Z00.8 ENCOUNTER FOR OTHER GENERAL EXAMINATION: ICD-10-CM

## 2025-06-03 DIAGNOSIS — Z95.818 PRESENCE OF OTHER CARDIAC IMPLANTS AND GRAFTS: Chronic | ICD-10-CM

## 2025-06-03 DIAGNOSIS — Z90.49 ACQUIRED ABSENCE OF OTHER SPECIFIED PARTS OF DIGESTIVE TRACT: Chronic | ICD-10-CM

## 2025-06-03 PROCEDURE — 71250 CT THORAX DX C-: CPT

## 2025-06-03 PROCEDURE — 71250 CT THORAX DX C-: CPT | Mod: 26

## 2025-06-11 ENCOUNTER — NON-APPOINTMENT (OUTPATIENT)
Age: 75
End: 2025-06-11

## 2025-06-12 ENCOUNTER — APPOINTMENT (OUTPATIENT)
Dept: CARDIOLOGY | Facility: CLINIC | Age: 75
End: 2025-06-12

## 2025-06-12 VITALS
SYSTOLIC BLOOD PRESSURE: 133 MMHG | WEIGHT: 205 LBS | BODY MASS INDEX: 30.36 KG/M2 | HEART RATE: 67 BPM | DIASTOLIC BLOOD PRESSURE: 77 MMHG | HEIGHT: 69 IN | OXYGEN SATURATION: 96 %

## 2025-06-12 PROBLEM — I49.3 FREQUENT PVCS: Status: ACTIVE | Noted: 2025-06-12

## 2025-06-12 PROBLEM — I73.9 BILATERAL CLAUDICATION OF LOWER LIMB: Status: ACTIVE | Noted: 2025-06-12

## 2025-06-12 PROCEDURE — 99215 OFFICE O/P EST HI 40 MIN: CPT | Mod: 25

## 2025-06-12 PROCEDURE — 93000 ELECTROCARDIOGRAM COMPLETE: CPT

## 2025-06-19 PROCEDURE — 93242 EXT ECG>48HR<7D RECORDING: CPT

## 2025-07-03 ENCOUNTER — APPOINTMENT (OUTPATIENT)
Dept: CARDIOLOGY | Facility: CLINIC | Age: 75
End: 2025-07-03
Payer: MEDICARE

## 2025-07-03 PROCEDURE — 93244 EXT ECG>48HR<7D REV&INTERPJ: CPT

## 2025-07-03 PROCEDURE — 93306 TTE W/DOPPLER COMPLETE: CPT

## 2025-07-03 PROCEDURE — 93880 EXTRACRANIAL BILAT STUDY: CPT

## 2025-07-09 ENCOUNTER — APPOINTMENT (OUTPATIENT)
Dept: CARDIOLOGY | Facility: CLINIC | Age: 75
End: 2025-07-09
Payer: MEDICARE

## 2025-07-09 PROCEDURE — 93923 UPR/LXTR ART STDY 3+ LVLS: CPT

## 2025-07-09 PROCEDURE — 93925 LOWER EXTREMITY STUDY: CPT

## 2025-08-26 ENCOUNTER — RX RENEWAL (OUTPATIENT)
Age: 75
End: 2025-08-26